# Patient Record
Sex: MALE | Race: WHITE | NOT HISPANIC OR LATINO | Employment: PART TIME | URBAN - METROPOLITAN AREA
[De-identification: names, ages, dates, MRNs, and addresses within clinical notes are randomized per-mention and may not be internally consistent; named-entity substitution may affect disease eponyms.]

---

## 2020-10-06 ENCOUNTER — TELEPHONE (OUTPATIENT)
Dept: FAMILY MEDICINE | Facility: CLINIC | Age: 50
End: 2020-10-06

## 2020-10-06 ENCOUNTER — OFFICE VISIT (OUTPATIENT)
Dept: FAMILY MEDICINE | Facility: CLINIC | Age: 50
End: 2020-10-06
Payer: COMMERCIAL

## 2020-10-06 VITALS
SYSTOLIC BLOOD PRESSURE: 120 MMHG | OXYGEN SATURATION: 98 % | TEMPERATURE: 97.8 F | DIASTOLIC BLOOD PRESSURE: 80 MMHG | HEART RATE: 97 BPM

## 2020-10-06 DIAGNOSIS — F41.1 GAD (GENERALIZED ANXIETY DISORDER): ICD-10-CM

## 2020-10-06 DIAGNOSIS — F43.10 PTSD (POST-TRAUMATIC STRESS DISORDER): ICD-10-CM

## 2020-10-06 DIAGNOSIS — F11.10 OPIOID ABUSE (H): Primary | ICD-10-CM

## 2020-10-06 PROCEDURE — 99203 OFFICE O/P NEW LOW 30 MIN: CPT | Performed by: PHYSICIAN ASSISTANT

## 2020-10-06 SDOH — HEALTH STABILITY: MENTAL HEALTH: HOW MANY STANDARD DRINKS CONTAINING ALCOHOL DO YOU HAVE ON A TYPICAL DAY?: NOT ASKED

## 2020-10-06 SDOH — HEALTH STABILITY: MENTAL HEALTH: HOW OFTEN DO YOU HAVE 6 OR MORE DRINKS ON ONE OCCASION?: NOT ASKED

## 2020-10-06 SDOH — HEALTH STABILITY: MENTAL HEALTH: HOW OFTEN DO YOU HAVE A DRINK CONTAINING ALCOHOL?: NOT ASKED

## 2020-10-06 NOTE — PROGRESS NOTES
Subjective     Justus Cope is a 50 year old male who presents to clinic today for the following health issues:    HPI       Here to establish care  Would like a referral to addiction medicine  Was addicted to opioids for 12 years. Was able to quite cold turkey about 1 year    His mom recently passed away  Feeling a lot of anxiety lately  Having a lot of cravings           Review of Systems   CONSTITUTIONAL: NEGATIVE for fever, chills, change in weight  INTEGUMENTARY/SKIN: NEGATIVE for worrisome rashes, moles or lesions  EYES: NEGATIVE for vision changes or irritation  ENT/MOUTH: NEGATIVE for ear, mouth and throat problems  RESP: NEGATIVE for significant cough or SOB  BREAST: NEGATIVE for masses, tenderness or discharge  CV: NEGATIVE for chest pain, palpitations or peripheral edema  GI: NEGATIVE for nausea, abdominal pain, heartburn, or change in bowel habits  : NEGATIVE for frequency, dysuria, or hematuria  MUSCULOSKELETAL: NEGATIVE for significant arthralgias or myalgia  NEURO: NEGATIVE for weakness, dizziness or paresthesias  ENDOCRINE: NEGATIVE for temperature intolerance, skin/hair changes  HEME: NEGATIVE for bleeding problems  PSYCHIATRIC: delusions, hallucinations, thoughts of hurting someone else and thoughts of self harm      Objective    /80   Pulse 97   Temp 97.8  F (36.6  C) (Oral)   SpO2 98%   There is no height or weight on file to calculate BMI.  Physical Exam   GENERAL: healthy, alert and no distress  EYES: Eyes grossly normal to inspection, PERRL and conjunctivae and sclerae normal  MS: no gross musculoskeletal defects noted, no edema  SKIN: no suspicious lesions or rashes  PSYCH: mentation appears normal, affect normal/bright              ICD-10-CM    1. Opioid abuse (H)  F11.10 INTEGRATED PRIMARY CARE REFERRAL     MENTAL HEALTH REFERRAL  - Adult; Addiction Medicine Provider; Addiction Medicine Evaluation & Treatment; Addiction Medicine Consultation, Evaluation & Treatment [phone]  [phone]; Opioids; Medication Assisted Treatment: Opioids; We will contact you t...   2. PTSD (post-traumatic stress disorder)  F43.10 MENTAL HEALTH REFERRAL  - Adult; Psychiatry, Outpatient Treatment; Individual/Couples/Family/Group Therapy/Health Psychology; [facility]: [facility] [phone]; Psychiatry; [facility]: Psychiatry Clinic [phone]; We will contact you t...   3. ROOPA (generalized anxiety disorder)  F41.1 MENTAL HEALTH REFERRAL  - Adult; Psychiatry, Outpatient Treatment; Individual/Couples/Family/Group Therapy/Health Psychology; [facility]: [facility] [phone]; Psychiatry; [facility]: Psychiatry Clinic [phone]; We will contact you t...     Unclear what will be happening with patients from [facility]. I was advised by Cindy to still place the referral. Return to clinic for any new or worsening symptoms or go to ER Urgent care in off hours      Patient Instructions   Follow up with IPC and addicition medicine  If this doesn't work out, follow up with psychiatrist, therapist and myself for a general physical  Return to clinic for any new or worsening symptoms or go to ER Urgent care in off hours

## 2020-10-06 NOTE — PATIENT INSTRUCTIONS
Follow up with IPC and addicition medicine  If this doesn't work out, follow up with psychiatrist, therapist and myself for a general physical  Return to clinic for any new or worsening symptoms or go to ER Urgent care in off hours

## 2020-10-06 NOTE — TELEPHONE ENCOUNTER
Please review referral. Please route back to writer.     Thank you,    Pau Hopper    [facility]

## 2020-10-07 ENCOUNTER — TELEPHONE (OUTPATIENT)
Dept: ADDICTION MEDICINE | Facility: CLINIC | Age: 50
End: 2020-10-07

## 2020-10-07 NOTE — TELEPHONE ENCOUNTER
Please review referral. Please route back to writer.     Thank you,    Pau Hopper    [facility]

## 2020-10-07 NOTE — TELEPHONE ENCOUNTER
"Please schedule appointment for patient with   Addiction Medicine Provider   For opioid use disorder     Note routed to referring provider   - Please feel free to reach out to myself with any specific patient care needs as well.     I can help address concerns and/or relay the information to the provider who will be scheduled. No need to reply if consult is sufficient.      Our staff will add the following information:     Please offer our patient to call [facility]'s assessment line - [phone]. They can ask for a \"chemical assessment\" or \"rule 25\". This will allow them to discuss possible psychosocial treatment options including individual therapy or any group options including outpatient, intensive outpatient, or residential (inpatient) treatment.          Jesu Arciniega MD    "

## 2020-10-08 NOTE — TELEPHONE ENCOUNTER
Called patient to offer appt with us in clinic tomorrow. LVM indicating we have an opening at 9am tomorrow will be blocked for this patient until 5pm today. Please schedule him for in person visit if he calls back.    Jesu Arciniega MD

## 2020-10-13 NOTE — TELEPHONE ENCOUNTER
Writer attempted to reach pt; no answer. LVM requesting a call back for an appt. Two more attempts will be made.     Pau Hopper    [facility]

## 2020-10-22 NOTE — TELEPHONE ENCOUNTER
Patient was referred to the Integrated primary care clinic, chart was reviewed, referred to addiction med, concerned about opioid relapse, increase in anxiety, no medical complexity,  at this time would recommend staying with current PCP, following with addiction and MH referral      Chana DIGGS  MEDICAL LEAD IPC

## 2020-10-26 NOTE — TELEPHONE ENCOUNTER
Spoke with patient and gave him information for mental health and addiction med referrals and phone numbers.    Nya Sharma RN   [facility]

## 2020-11-16 ENCOUNTER — TELEPHONE (OUTPATIENT)
Dept: ADDICTION MEDICINE | Facility: CLINIC | Age: 50
End: 2020-11-16

## 2020-11-16 NOTE — TELEPHONE ENCOUNTER
Called Justus regarding Recovery [facility]. He is scheduled to see me next Monday morning at 11am for opioid use disorder. Recommended he can be seen this week as a walk-in at  if interested. Provided clinic info, including hours, location, and ph#.    LVM with above information.    Jesu Arciniega MD

## 2020-11-23 ENCOUNTER — OFFICE VISIT (OUTPATIENT)
Dept: ADDICTION MEDICINE | Facility: CLINIC | Age: 50
End: 2020-11-23
Attending: PHYSICIAN ASSISTANT
Payer: COMMERCIAL

## 2020-11-23 VITALS
TEMPERATURE: 98.9 F | SYSTOLIC BLOOD PRESSURE: 136 MMHG | HEART RATE: 93 BPM | WEIGHT: 103.25 LBS | RESPIRATION RATE: 18 BRPM | BODY MASS INDEX: 15.65 KG/M2 | DIASTOLIC BLOOD PRESSURE: 74 MMHG | OXYGEN SATURATION: 98 % | HEIGHT: 68 IN

## 2020-11-23 DIAGNOSIS — M54.2 CHRONIC NECK PAIN: ICD-10-CM

## 2020-11-23 DIAGNOSIS — F10.10 ALCOHOL CONSUMPTION BINGE DRINKING: ICD-10-CM

## 2020-11-23 DIAGNOSIS — G89.29 CHRONIC NECK PAIN: ICD-10-CM

## 2020-11-23 DIAGNOSIS — F11.90 OPIOID USE DISORDER: Primary | ICD-10-CM

## 2020-11-23 LAB
AMPHETAMINES UR QL: NOT DETECTED NG/ML
BARBITURATES UR QL SCN: NOT DETECTED NG/ML
BENZODIAZ UR QL SCN: ABNORMAL NG/ML
BUPRENORPHINE UR QL: ABNORMAL NG/ML
CANNABINOIDS UR QL: NOT DETECTED NG/ML
COCAINE UR QL SCN: NOT DETECTED NG/ML
D-METHAMPHET UR QL: NOT DETECTED NG/ML
METHADONE UR QL SCN: NOT DETECTED NG/ML
OPIATES UR QL SCN: NOT DETECTED NG/ML
OXYCODONE UR QL SCN: NOT DETECTED NG/ML
PCP UR QL SCN: NOT DETECTED NG/ML
PROPOXYPH UR QL: NOT DETECTED NG/ML
TRICYCLICS UR QL SCN: NOT DETECTED NG/ML

## 2020-11-23 PROCEDURE — 80306 DRUG TEST PRSMV INSTRMNT: CPT | Performed by: FAMILY MEDICINE

## 2020-11-23 PROCEDURE — 87389 HIV-1 AG W/HIV-1&-2 AB AG IA: CPT | Performed by: FAMILY MEDICINE

## 2020-11-23 PROCEDURE — 36415 COLL VENOUS BLD VENIPUNCTURE: CPT | Performed by: FAMILY MEDICINE

## 2020-11-23 PROCEDURE — 99205 OFFICE O/P NEW HI 60 MIN: CPT | Performed by: FAMILY MEDICINE

## 2020-11-23 RX ORDER — FLUOXETINE 10 MG/1
10 CAPSULE ORAL DAILY
COMMUNITY
Start: 2020-08-13 | End: 2020-12-07 | Stop reason: ALTCHOICE

## 2020-11-23 RX ORDER — BUPRENORPHINE HYDROCHLORIDE AND NALOXONE HYDROCHLORIDE DIHYDRATE 2; .5 MG/1; MG/1
1 TABLET SUBLINGUAL 2 TIMES DAILY
Qty: 14 TABLET | Refills: 0 | Status: SHIPPED | OUTPATIENT
Start: 2020-11-23 | End: 2020-11-27

## 2020-11-23 ASSESSMENT — MIFFLIN-ST. JEOR: SCORE: 1302.84
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
## 2020-11-23 NOTE — PROGRESS NOTES
"Justus Cope is a 50 year old male who is being evaluated via a billable video visit.      The patient has been notified of following:     \"This video visit will be conducted via a call between you and your physician/provider. We have found that certain health care needs can be provided without the need for an in-person physical exam.  This service lets us provide the care you need with a video conversation.  If a prescription is necessary we can send it directly to your pharmacy.  If lab work is needed we can place an order for that and you can then stop by our lab to have the test done at a later time.    Video visits are billed at different rates depending on your insurance coverage.  Please reach out to your insurance provider with any questions.    If during the course of the call the physician/provider feels a video visit is not appropriate, you will not be charged for this service.\"    Patient has given verbal consent for Video visit? Yes  How would you like to obtain your AVS? MyChart  If you are dropped from the video visit, the video invite should be resent to: Text to cell phone: [phone]   Will anyone else be joining your video visit? No        SUBJECTIVE:                                                    BUPRENORPHINE FOLLOW UP:    Justus Cope is a 50 year old male who completes virtual visit today for follow up of Buprenorphine management      THIS VISIT IS BEING CONDUCTED VIRTUALLY D/T COVID-19 EMERGENCY    Video-Visit Details    Type of service:  Video Visit    Video Start Time: 9:40 AM  Video End Time: 9:51 AM    Originating Location (pt. Location): Home    Distant Location (provider location):  [facility]     Platform used for Video Visit: [employer]        [facility] Nov 27, 2020  SE: n/a  - Steven never received his Rx  - Denies any alcohol or benzo use, no opioid use  - Discussed potential changes to medication in order for insurance to cover this  - " Reports no concerns over the past few days, mostly just wanting to get started as planned  - We discussed that we will start with suboxone, not subutex as he requested over the phone last week      Chart Reviewed - Interval History per EPIC:   Reviewed with patient for clarification and further details  - called about difficulty filling Rx    ROS:  Complete, 10 point ROS completed. Negative except:  none      A/P                                                    ASSESSMENT  Justus was seen today for video visit and recheck medication.    Diagnoses and all orders for this visit:    Opioid use disorder (H)  -     Discontinue: buprenorphine-naloxone (SUBOXONE) 2-0.5 MG SUBL sublingual tablet; Place 1 tablet under the tongue 2 times daily  -     buprenorphine-naloxone (SUBOXONE) 8-2 MG SUBL sublingual tablet; Take 1/2 tablet daily      #7 tabs prescribed    Re-ordered same care plan from last week    PLAN (includes SHx)  - Start suboxone; no changes from last week  - No alcohol/benzo use. Will arrange for in-clinic follow-up in the next 2 weeks to confirm abstinence    RTC:  Future Appointments   Date Time Provider Department Center   12/2/2020 10:00 AM Jesu Arciniega MD [facility] [place]             ENCOUNTER FOR LONG TERM USE OF HIGH RISK MEDICATION   High Risk Drug Monitoring?  YES   Drug being monitored: Buprenorphine   Reason for drug: Opioid Use Disorder   What is being monitored?: Dosage, Cravings, Trigger, side effects, and continued abstinence.   Patient has narcan supply at home    Counseled the patient on the importance of having a recovery program in addition to medication to manage recovery.  Components include avoiding isolating, having willingness to change, avoiding triggers and managing cravings. Encouraged having some type of sober network and practicing honesty with trusted support person(s). Encouraged other services such as counseling, 12 step or other self-help organizations.      Opioid warning  reviewed.  Risk of overdose following a period of abstinence due to decrease tolerance was discussed including risk of death.  Strongly recommended abstain from alcohol, benzodiazepines, THC, opioids and other drugs of abuse.  Increased risk of return to opioid use after use of these substances discussed.  Increased risk of overdose/death with use of other substances particularly benzodiazepines/alcohol reviewed.      HISTORY                                                    Problem list reviewed & adjusted, as indicated.  Patient Active Problem List   Diagnosis     Opioid use disorder (H)     Chronic neck pain       PMH, FMH reviewed and updated in [facility].  Social History per Tx plan and HPI, as above.    MEDICATION LIST (prior to visit)       FLUoxetine (PROZAC) 10 MG capsule, Take 10 mg by mouth daily    No current facility-administered medications on file prior to visit.       No Known Allergies      OBJECTIVE                                                    PHYSICAL EXAM:  UNABLE TO PERFORM VITALS W/VIRTUAL VISIT    GENERAL: Healthy, alert and no distress  EYES: Eyes grossly normal to inspection.  No discharge or erythema, or obvious scleral/conjunctival abnormalities.  RESP: No audible wheeze, cough, or visible cyanosis.  No visible retractions or increased work of breathing.    SKIN: Visible skin clear. No significant rash, abnormal pigmentation or lesions.  NEURO: Cranial nerves grossly intact.  Mentation and speech appropriate for age.  PSYCH: Mentation appears normal, affect normal/bright, judgement and insight intact, normal speech and appearance well-groomed.    LAB  Labs reviewed in EPIC        [place] Board of Pharmacy Data Base Reviewed;    Consistent with patient reports and [facility] records.      Jesu Arciniega MD  [facility] Addiction Medicine  [phone]

## 2020-11-24 ENCOUNTER — TELEPHONE (OUTPATIENT)
Dept: ADDICTION MEDICINE | Facility: CLINIC | Age: 50
End: 2020-11-24

## 2020-11-24 LAB — HIV 1+2 AB+HIV1 P24 AG SERPL QL IA: NONREACTIVE

## 2020-11-24 NOTE — TELEPHONE ENCOUNTER
Central Prior Authorization Team   Phone: [phone]      PA Initiation    Medication: buprenorphine-naloxone (SUBOXONE) 2-0.5 MG SUBL sublingual tablet - INITIATED  Insurance Company: [employer] - Phone [phone] Fax [phone]  Pharmacy Filling the Rx: [facility] - [place], [place] - [address] [place] [address] [address]  Filling Pharmacy Phone: [phone]  Filling Pharmacy Fax: [phone]  Start Date: 11/24/2020

## 2020-11-24 NOTE — TELEPHONE ENCOUNTER
Prior Authorization Retail Medication Request    Medication/Dose: buprenorphine-naloxone (SUBOXONE) 2-0.5 MG SUBL sublingual tablet      Insurance Name: [employer]   Insurance ID: [id number]   Key: [id number]      Pharmacy Information (if different than what is on RX)  Name: [facility]   Phone: [phone]  Fax: [phone]

## 2020-11-25 NOTE — TELEPHONE ENCOUNTER
Central Prior Authorization Team   Phone: [phone]      Prior Authorization Approval    Authorization Effective Date: 11/25/2020  Authorization Expiration Date: 11/25/2020  Medication: buprenorphine-naloxone (SUBOXONE) 2-0.5 MG SUBL sublingual tablet - APPROVED  Approved Dose/Quantity: 14 FOR 7  Reference #:     Insurance Company: [employer] - Phone [phone] Fax [phone]  Expected CoPay:       CoPay Card Available:      Foundation Assistance Needed:    Which Pharmacy is filling the prescription (Not needed for infusion/clinic administered): [facility] - [place], [place] - [address] [place] [address] [address]  Pharmacy Notified: Yes  Patient Notified: Yes (**Instructed pharmacy to notify patient when script is ready to /ship.**)    Verbal approval from [employer] - approved for a one time fill valid today only.    Pt notified pharmacy he was expecting a buprenorphine only product - I see in his chart naloxone is listed as an allergy.    If provider would like to switch to a buprenorphine only product please send new rx to pharmacy - if not, the suboxone is ready for the pt to . Please notify the pt of decision.

## 2020-11-27 ENCOUNTER — VIRTUAL VISIT (OUTPATIENT)
Dept: ADDICTION MEDICINE | Facility: CLINIC | Age: 50
End: 2020-11-27
Payer: COMMERCIAL

## 2020-11-27 ENCOUNTER — TELEPHONE (OUTPATIENT)
Dept: ADDICTION MEDICINE | Facility: CLINIC | Age: 50
End: 2020-11-27

## 2020-11-27 DIAGNOSIS — F11.90 OPIOID USE DISORDER: Primary | ICD-10-CM

## 2020-11-27 PROCEDURE — 99214 OFFICE O/P EST MOD 30 MIN: CPT | Mod: 95 | Performed by: FAMILY MEDICINE

## 2020-11-27 RX ORDER — BUPRENORPHINE HYDROCHLORIDE AND NALOXONE HYDROCHLORIDE DIHYDRATE 8; 2 MG/1; MG/1
TABLET SUBLINGUAL
Qty: 5 TABLET | Refills: 0 | Status: SHIPPED | OUTPATIENT
Start: 2020-11-27 | End: 2020-12-02

## 2020-11-27 RX ORDER — BUPRENORPHINE HYDROCHLORIDE AND NALOXONE HYDROCHLORIDE DIHYDRATE 2; .5 MG/1; MG/1
1 TABLET SUBLINGUAL 2 TIMES DAILY
Qty: 14 TABLET | Refills: 0 | Status: SHIPPED | OUTPATIENT
Start: 2020-11-27 | End: 2020-11-27 | Stop reason: DRUGHIGH

## 2020-11-27 NOTE — PROGRESS NOTES
"Justus Cope is a 50 year old male who is being evaluated via a billable video visit.      The patient has been notified of following:     \"This video visit will be conducted via a call between you and your physician/provider. We have found that certain health care needs can be provided without the need for an in-person physical exam.  This service lets us provide the care you need with a video conversation.  If a prescription is necessary we can send it directly to your pharmacy.  If lab work is needed we can place an order for that and you can then stop by our lab to have the test done at a later time.    Video visits are billed at different rates depending on your insurance coverage.  Please reach out to your insurance provider with any questions.    If during the course of the call the physician/provider feels a video visit is not appropriate, you will not be charged for this service.\"    Patient has given verbal consent for Video visit? Yes  How would you like to obtain your AVS? MyChart  If you are dropped from the video visit, the video invite should be resent to: Text to cell phone: [phone]   Will anyone else be joining your video visit? No        SUBJECTIVE:                                                    BUPRENORPHINE FOLLOW UP:    Justus Cope is a 50 year old male who completes virtual visit today for follow up of Buprenorphine management      THIS VISIT IS BEING CONDUCTED VIRTUALLY D/T COVID-19 EMERGENCY    Video-Visit Details    Type of service:  Video Visit    Video Start Time: 10:15 AM  Video End Time: 10:38 AM    Originating Location (pt. Location): Home    Distant Location (provider location):  [facility]     Platform used for Video Visit: [facility] Dec 2, 2020  SE: upset stomach  - Steven received Rx from [facility], which was different from the initial Rx. Our instructions were to take 2-0.5mg tabs twice daily, but insurance would not cover this twice and his " initial pharmacy was not responsive. New Rx sent for 8-2mg tabs with instructions to take 1/2 tab daily. Asked pharmacist to d/w patient  - Steven did not discuss with pharmacist and did not pay attention to the quantity or directions. He took 2 tablets, with initial dose of 16mg.  - Experienced some runny stool, upset stomach, and subsequently started taking small amounts of suboxone (pieces of a tablet) in order to last until today  - No cravings at low doses  - Going to work without any concerns  - 2 beers in the past 5 days  - No benzo use  - Continues to struggle with anxiety      Chart Reviewed - Interval History per EPIC:   Reviewed with patient for clarification and further details  - refill request Monday, not seen by MD until today    ROS:  Complete, 10 point ROS completed. Negative except:  Stomach sx as above      A/P                                                    ASSESSMENT  Justus was seen today for video visit.    Diagnoses and all orders for this visit:    Opioid use disorder (H)  -     buprenorphine-naloxone (SUBOXONE) 8-2 MG SUBL sublingual tablet; Take 1/2 tablet twice daily      #7 tabs prescribed    - Reinforced instructions with teachback  - Suspect his side-effects were d/t rapid titration of suboxone  - He did not experience any adverse effects d/t previous use of his partner's subutex    PLAN (includes SHx)  OUD treatment plan updated Dec 2, 2020 and reviewed with patient:    Problem   Opioid Use Disorder (H)    OUD Treatment Plan, with historical details (incl SHx)    Rx details: suboxone 8-2mg tabs, 1/2 tab BID   F/up: weekly       Severity indicators (O/D, IVDU, etc): No severe features. Using his partner's subutex and valium, other benzos, and bingeing on alcohol often   Medical complications: None; depression/anxiety present       Housing:  Stable, lives with partner in [facility] Capital: none   Employment: Full-time job in refrigeration   Legal:  none   Other social  barriers:  Distance to clinic   Consent to Communicate? Partner; no consent signed, but verbally on 1st visit. No contact info       Tx history None; started on suboxone @ [facility] Nov 23, 2020   Current Psychosocial tx:  none     Recent Changes:  Dec 2, 2020 - Reinforced appropriate dosing strategy. F/up next week with alcohol/benzo testing. Patient struggling with complete abstinence.        - Needs twice monthly urine drug screens at a minimum given his ongoing alcohol use and intermittent benzo use with inconsistent reporting of use  - Indicated we will further discuss mental health after stabilizing on buprenorphine    RTC:  Future Appointments   Date Time Provider Department Center   12/7/2020 11:20 AM Jesu Arciniega MD [facility]             ENCOUNTER FOR LONG TERM USE OF HIGH RISK MEDICATION   High Risk Drug Monitoring?  YES   Drug being monitored: Buprenorphine   Reason for drug: Opioid Use Disorder   What is being monitored?: Dosage, Cravings, Trigger, side effects, and continued abstinence.   Patient has narcan supply at home    Counseled the patient on the importance of having a recovery program in addition to medication to manage recovery.  Components include avoiding isolating, having willingness to change, avoiding triggers and managing cravings. Encouraged having some type of sober network and practicing honesty with trusted support person(s). Encouraged other services such as counseling, 12 step or other self-help organizations.      Opioid warning reviewed.  Risk of overdose following a period of abstinence due to decrease tolerance was discussed including risk of death.  Strongly recommended abstain from alcohol, benzodiazepines, THC, opioids and other drugs of abuse.  Increased risk of return to opioid use after use of these substances discussed.  Increased risk of overdose/death with use of other substances particularly benzodiazepines/alcohol reviewed.      HISTORY                                                     Problem list reviewed & adjusted, as indicated.  Patient Active Problem List   Diagnosis     Opioid use disorder (H)     Chronic neck pain       PMH, FMH reviewed and updated in [facility].  Social History per Tx plan and HPI, as above.    MEDICATION LIST (prior to visit)       FLUoxetine (PROZAC) 10 MG capsule, Take 10 mg by mouth daily    No current facility-administered medications on file prior to visit.       No Known Allergies      OBJECTIVE                                                    PHYSICAL EXAM:  UNABLE TO PERFORM VITALS W/VIRTUAL VISIT    GENERAL: Healthy, alert and no distress  EYES: Eyes grossly normal to inspection.  No discharge or erythema, or obvious scleral/conjunctival abnormalities.  RESP: No audible wheeze, cough, or visible cyanosis.  No visible retractions or increased work of breathing.    SKIN: Visible skin clear. No significant rash, abnormal pigmentation or lesions.  NEURO: Cranial nerves grossly intact.  Mentation and speech appropriate for age.  PSYCH: Mentation appears normal, affect normal/bright, mood somewhat anxious, judgement and insight intact, normal speech and appearance well-groomed.    LAB  Labs reviewed in EPIC        [place] Board of Pharmacy Data Base Reviewed;    Consistent with patient reports and [facility] records.      Jesu Arciniega MD  [facility]  [phone]

## 2020-11-27 NOTE — TELEPHONE ENCOUNTER
Please call Leah August - [phone] - to confirm insurance coverage and medication availability - new Rx sent there this morning, as his [facility] pharmacy does not have this in stock until Monday.    Please call patient when medication is confirmed available.    Jesu Arciniega MD

## 2020-11-30 DIAGNOSIS — F11.90 OPIOID USE DISORDER: ICD-10-CM

## 2020-12-02 ENCOUNTER — VIRTUAL VISIT (OUTPATIENT)
Dept: ADDICTION MEDICINE | Facility: CLINIC | Age: 50
End: 2020-12-02
Payer: COMMERCIAL

## 2020-12-02 DIAGNOSIS — F11.90 OPIOID USE DISORDER: ICD-10-CM

## 2020-12-02 PROCEDURE — 99214 OFFICE O/P EST MOD 30 MIN: CPT | Mod: 95 | Performed by: FAMILY MEDICINE

## 2020-12-02 RX ORDER — BUPRENORPHINE HYDROCHLORIDE AND NALOXONE HYDROCHLORIDE DIHYDRATE 8; 2 MG/1; MG/1
TABLET SUBLINGUAL
Qty: 5 TABLET | OUTPATIENT
Start: 2020-12-02

## 2020-12-02 RX ORDER — BUPRENORPHINE HYDROCHLORIDE AND NALOXONE HYDROCHLORIDE DIHYDRATE 8; 2 MG/1; MG/1
TABLET SUBLINGUAL
Qty: 7 TABLET | Refills: 0 | Status: SHIPPED | OUTPATIENT
Start: 2020-12-02 | End: 2020-12-07 | Stop reason: DRUGHIGH

## 2020-12-07 ENCOUNTER — TELEPHONE (OUTPATIENT)
Dept: ADDICTION MEDICINE | Facility: CLINIC | Age: 50
End: 2020-12-07

## 2020-12-07 ENCOUNTER — OFFICE VISIT (OUTPATIENT)
Dept: ADDICTION MEDICINE | Facility: CLINIC | Age: 50
End: 2020-12-07
Payer: COMMERCIAL

## 2020-12-07 VITALS
DIASTOLIC BLOOD PRESSURE: 78 MMHG | SYSTOLIC BLOOD PRESSURE: 138 MMHG | HEART RATE: 72 BPM | BODY MASS INDEX: 15.51 KG/M2 | WEIGHT: 102 LBS | OXYGEN SATURATION: 97 % | TEMPERATURE: 98.3 F

## 2020-12-07 DIAGNOSIS — F10.10 ALCOHOL CONSUMPTION BINGE DRINKING: ICD-10-CM

## 2020-12-07 DIAGNOSIS — F41.8 DEPRESSION WITH ANXIETY: ICD-10-CM

## 2020-12-07 DIAGNOSIS — F11.90 OPIOID USE DISORDER: Primary | ICD-10-CM

## 2020-12-07 PROCEDURE — 80307 DRUG TEST PRSMV CHEM ANLYZR: CPT | Mod: 90 | Performed by: FAMILY MEDICINE

## 2020-12-07 PROCEDURE — 36415 COLL VENOUS BLD VENIPUNCTURE: CPT | Performed by: FAMILY MEDICINE

## 2020-12-07 PROCEDURE — 99214 OFFICE O/P EST MOD 30 MIN: CPT | Performed by: FAMILY MEDICINE

## 2020-12-07 PROCEDURE — 99000 SPECIMEN HANDLING OFFICE-LAB: CPT | Performed by: FAMILY MEDICINE

## 2020-12-07 PROCEDURE — 80346 BENZODIAZEPINES1-12: CPT | Mod: 90 | Performed by: FAMILY MEDICINE

## 2020-12-07 RX ORDER — BUPRENORPHINE HYDROCHLORIDE AND NALOXONE HYDROCHLORIDE DIHYDRATE 2; .5 MG/1; MG/1
1 TABLET SUBLINGUAL 2 TIMES DAILY
Qty: 14 TABLET | Refills: 0
Start: 2020-12-07 | End: 2020-12-08

## 2020-12-07 RX ORDER — BUPRENORPHINE HYDROCHLORIDE AND NALOXONE HYDROCHLORIDE DIHYDRATE 8; 2 MG/1; MG/1
TABLET SUBLINGUAL
Qty: 7 TABLET | Refills: 0 | Status: SHIPPED | OUTPATIENT
Start: 2020-12-07 | End: 2020-12-16

## 2020-12-07 RX ORDER — PAROXETINE 10 MG/1
10 TABLET, FILM COATED ORAL EVERY MORNING
Qty: 30 TABLET | Refills: 1 | Status: SHIPPED | OUTPATIENT
Start: 2020-12-07 | End: 2021-02-03

## 2020-12-07 NOTE — TELEPHONE ENCOUNTER
Please start prior authorization for suboxone tablets.    [facility] of any particular strength daily.    Patient has diarrhea from 8-2mg dose (1/2 tab or 4-1mg dose twice daily).    Want to switch to 2-0.5mg twice daily dose, but insurance will not cover this.    One 2-0.5mg tablet daily is not sufficient to control cravings or provide overdose protection.    He has tried and failed suboxone films d/t nausea, retching, and vomiting.    Jesu Arciniega MD

## 2020-12-07 NOTE — PROGRESS NOTES
SUBJECTIVE                                                    BUPRENORPHINE FOLLOW UP:    Justus Cope is a 50 year old male who presents to clinic today for follow up of Buprenorphine.        HPI Dec 7, 2020  SE: diarrhea  - feeling somewhat more energetic since starting suboxone  - Runny stools; 2-3 times per day, typically once a day  - Starts feeling an upset stomach shortly after taking the buprenorphine  - No cravings  - Helping a little with neck pain as well  - Last use of alcohol 2 days ago; stressed about holidays, trying to put up lights.  - reports a long history of using alcohol as his primary coping tool for distress. Also reports using benzos for this in the past, but denies any use of these since we started working together  - Wants to try medications for anxiety and stress mgmt.  - Has tried lexapro, duloxetine, and propranolol in the past, and none of these have been helpful  - Took fluoxetine recently, which made him vomit        Chart Reviewed - Interval History:   Reviewed with patient for clarification and further details  - phone calls to clinic for Rx issues    ROS:  Complete, 10 point ROS completed. Negative except:  Diarrhea, stress/anxiety      A/P                                                    ASSESSMENT  Diagnoses and all orders for this visit:    Opioid use disorder (H)  -     Urine Drugs of Abuse Screen Panel 13  -     Benzodiazepine Confirmatory Urine Qual  -     buprenorphine-naloxone (SUBOXONE) 8-2 MG SUBL sublingual tablet; Take 1/2 tablet twice daily  -     Ethyl Glucuronide Urine    Depression with anxiety  -     PARoxetine (PAXIL) 10 MG tablet; Take 1 tablet (10 mg) by mouth every morning Take 1/2 tablet for the first 3-5 days    Alcohol consumption binge drinking  -     Ethyl Glucuronide Urine      #7 tabs prescribed    - Will work with insurance to get PA completed so he can try a lower dose of suboxone and see if this helps his diarrhea  - Currently noticing  improved mood/energy with suboxone, but stress/anxiety/irritability still a problem  - Continues to drink as a main coping skill for above sx  - denies further benzo use  - Paxil as above; discussed risks/benefits including potential GI upset (which will resolve in days), risk of weight gain and drowsiness - advised to take at night if drowsiness a concern.      PLAN (includes SHx)  OUD treatment plan updated Dec 7, 2020 and reviewed with patient:    Problem   Opioid Use Disorder (H)    OUD Treatment Plan, with historical details (incl SHx)    Rx details: suboxone 8-2mg tabs, 1/2 tab BID   F/up: weekly       Severity indicators (O/D, IVDU, etc): No severe features. Using his partner's subutex and valium, other benzos, and bingeing on alcohol often   Medical complications: None; depression/anxiety present       Housing:  Stable, lives with partner in [place] Capital: none   Employment: Full-time job in refrigeration   Legal:  none   Other social barriers:  Distance to clinic   Consent to Communicate? Partner; no consent signed, but verbally on 1st visit. No contact info       Tx history None; started on suboxone @ [facility] Nov 23, 2020   Current Psychosocial tx:  none     - Needs twice monthly urine drug screens at a minimum given his ongoing alcohol use and intermittent benzo use with inconsistent reporting of use    Recent Changes:  Dec 2, 2020 - Reinforced appropriate dosing strategy. F/up next week with alcohol/benzo testing. Patient struggling with complete abstinence.  Dec 7, 2020 - Will attempt decreasing suboxone to 2mg BID dose to see if this improves diarrhea. Advised we will not consider switch to subutex unless he can prove alcohol abstinence. Need to continue testing for benzo quant if urine screen remains positive at 2wk f/up            RTC:  Future Appointments   Date Time Provider Department Center   12/23/2020  9:00 AM Jesu Arciniega MD [facility]             ENCOUNTER FOR LONG TERM USE  OF HIGH RISK MEDICATION   High Risk Drug Monitoring?  YES   Drug being monitored: Buprenorphine   Reason for drug: Opioid Use Disorder   What is being monitored?: Dosage, Cravings, Trigger, side effects, and continued abstinence.   Patient has narcan supply at home    Counseled the patient on the importance of having a recovery program in addition to medication to manage recovery.  Components include avoiding isolating, having willingness to change, avoiding triggers and managing cravings. Encouraged having some type of sober network and practicing honesty with trusted support person(s). Encouraged other services such as counseling, 12 step or other self-help organizations.      Opioid warning reviewed.  Risk of overdose following a period of abstinence due to decrease tolerance was discussed including risk of death.  Strongly recommended abstain from alcohol, benzodiazepines, THC, opioids and other drugs of abuse.  Increased risk of return to opioid use after use of these substances discussed.  Increased risk of overdose/death with use of other substances particularly benzodiazepines/alcohol reviewed.        HISTORY                                                    Problem list reviewed & adjusted, as indicated.  Patient Active Problem List   Diagnosis     Opioid use disorder (H)     Chronic neck pain       PMH, FMH reviewed and updated in [facility].  Social History per Tx plan and HPI, as above.    MEDICATION LIST (prior to visit)  No current outpatient medications on file prior to visit.  No current facility-administered medications on file prior to visit.       No Known Allergies      OBJECTIVE                                                    PHYSICAL EXAM:  /78   Pulse 72   Temp 98.3  F (36.8  C) (Temporal)   Wt 46.3 kg (102 lb)   SpO2 97%   BMI 15.51 kg/m      GENERAL APPEARANCE:  alert, comfortable appearing  EYES:Eyes grossly normal to inspection  NEURO:  Gait normal.  No tremor. Coordination  intact.   MENTAL STATUS EXAM:  Appearance/Behavior: No appearant distress  Speech: Normal  Mood/Affect: anxiety  Insight: Fair    LAB  Results for orders placed or performed in visit on 12/07/20   Urine Drugs of Abuse Screen Panel 13     Status: Abnormal   Result Value Ref Range    Cannabinoids (75-ljn-7-carboxy-9-THC) Not Detected NDET^Not Detected ng/mL    Phencyclidine (Phencyclidine) Not Detected NDET^Not Detected ng/mL    Cocaine (Benzoylecgonine) Not Detected NDET^Not Detected ng/mL    Methamphetamine (d-Methamphetamine) Not Detected NDET^Not Detected ng/mL    Opiates (Morphine) Not Detected NDET^Not Detected ng/mL    Amphetamine (d-Amphetamine) Not Detected NDET^Not Detected ng/mL    Benzodiazepines (Nordiazepam) Detected, Abnormal Result (A) NDET^Not Detected ng/mL    Tricyclic Antidepressants (Desipramine) Not Detected NDET^Not Detected ng/mL    Methadone (Methadone) Not Detected NDET^Not Detected ng/mL    Barbiturates (Butalbital) Not Detected NDET^Not Detected ng/mL    Oxycodone (Oxycodone) Not Detected NDET^Not Detected ng/mL    Propoxyphene (Norpropoxyphene) Not Detected NDET^Not Detected ng/mL    Buprenorphine (Buprenorphine) Detected, Abnormal Result (A) NDET^Not Detected ng/mL   Awaiting ETG results      [facility] Board of Pharmacy Data Base Reviewed;    Consistent with patient reports and Epic records.      Jesu Arciniega MD  [facility]  [phone]

## 2020-12-08 NOTE — TELEPHONE ENCOUNTER
Medication pended.   Routing to provider for approval.    Faina Linder, RN    Nurse Liaison  [facility]    Addiction Medicine Services

## 2020-12-08 NOTE — TELEPHONE ENCOUNTER
Central Prior Authorization Team   Phone: [phone]      Prior Authorization Approval    Authorization Effective Date: 12/8/2020  Authorization Expiration Date: 12/7/2021  Medication: buprenorphine-naloxone (SUBOXONE) 2-0.5 MG SUBL sublingual tablet - APPROVED  Approved Dose/Quantity: 14 FOR 7  Reference #:     Insurance Company: [employer] - Phone [phone] Fax [phone]  Expected CoPay:       CoPay Card Available:      Foundation Assistance Needed:    Which Pharmacy is filling the prescription (Not needed for infusion/clinic administered): [facility] - [address]  Pharmacy Notified: Yes  Patient Notified: Yes (**Instructed pharmacy to notify patient when script is ready to /ship.**)    Verbal approval from Amy with [employer].    Notified pharmacy - pharmacist stated the rx was discontinued due to the pt picking up a 7 days supply of buprenorphine-naloxone (SUBOXONE) 8-2 MG SUBL sublingual tablet yesterday, 12/07/2020.    Pharmacy will need a new rx for future fills.

## 2020-12-08 NOTE — TELEPHONE ENCOUNTER
Central Prior Authorization Team   Phone: [phone]      PA Initiation    Medication: buprenorphine-naloxone (SUBOXONE) 2-0.5 MG SUBL sublingual tablet - INITIATED  Insurance Company: [employer] - Phone [phone] Fax [phone]  Pharmacy Filling the Rx: [facility] - [place], [place] - [address] [place] [address] [address]  Filling Pharmacy Phone: [phone]  Filling Pharmacy Fax: [phone]  Start Date: 12/8/2020

## 2020-12-09 RX ORDER — BUPRENORPHINE HYDROCHLORIDE AND NALOXONE HYDROCHLORIDE DIHYDRATE 2; .5 MG/1; MG/1
1 TABLET SUBLINGUAL 2 TIMES DAILY
Qty: 16 TABLET | Refills: 0 | Status: SHIPPED | OUTPATIENT
Start: 2020-12-09 | End: 2020-12-16

## 2020-12-09 NOTE — TELEPHONE ENCOUNTER
Called patient, reported approval of PA.    Will start with 2-0.5mg suboxone BID to see if this helps overcome his diarrhea as a reported SE of 4-1mg twice daily.    Indicated that, even if side-effects continue, we will not switch to subutex (which is patient's preference) if he continues to use alcohol and benzos.    Will call in 1 week to check on efficacy of lower dose, and he has f/up scheduled for 2 weeks from now.    Jesu Arciniega MD

## 2020-12-16 ENCOUNTER — TELEPHONE (OUTPATIENT)
Dept: FAMILY MEDICINE | Facility: CLINIC | Age: 50
End: 2020-12-16

## 2020-12-16 DIAGNOSIS — F11.90 OPIOID USE DISORDER: Primary | ICD-10-CM

## 2020-12-16 LAB
A-OH ALPRAZ UR QL CFM: NEGATIVE
ALPRAZ UR QL CFM: NEGATIVE
ETHYL GLUCURONIDE UR QL: POSITIVE
ETHYL GLUCURONIDE UR-MCNC: NORMAL NG/ML
ETHYL SULFATE UR-MCNC: NORMAL NG/ML
LORAZEPAM UR QL CFM: NEGATIVE
NORDIAZEPAM UR QL CFM: NEGATIVE
OXAZEPAM UR QL CFM: NEGATIVE
TEMAZEPAM UR QL CFM: NEGATIVE

## 2020-12-16 RX ORDER — BUPRENORPHINE HYDROCHLORIDE AND NALOXONE HYDROCHLORIDE DIHYDRATE 2; .5 MG/1; MG/1
1 TABLET SUBLINGUAL 2 TIMES DAILY
Qty: 16 TABLET | Refills: 0 | Status: SHIPPED | OUTPATIENT
Start: 2020-12-16 | End: 2020-12-23

## 2020-12-16 NOTE — TELEPHONE ENCOUNTER
Called Justus to discuss suboxone.    Prescribed a lower dose of 2mg twice daily last week, which has helped a lot to improve his gastric distress and diarrhea. No cravings, feeling positive overall.    No side-effects from paxil; not noticing any anxiety improvement yet.    No benzo use. Had a drink with dinner 4 days ago.    Appt in person next week.    Jesu Arciniega MD

## 2020-12-16 NOTE — TELEPHONE ENCOUNTER
Reason for Call: call back and prescription     Detailed comments: Patient was wondering if they needed to contact the doctor to get their weekly refill filled.    Phone Number Patient can be reached at: Cell number on file:    Telephone Information:   Mobile [phone]       Best Time: anytime    Can we leave a detailed message on this number? YES    Call taken on 12/16/2020 at 11:47 AM by Polo Rushing

## 2020-12-16 NOTE — TELEPHONE ENCOUNTER
Will forward to Dr Arciniega - appears this is  related to Suboxone rx.    Marylu Dawn, DO on 12/16/2020 at 12:43 PM

## 2020-12-23 ENCOUNTER — OFFICE VISIT (OUTPATIENT)
Dept: ADDICTION MEDICINE | Facility: CLINIC | Age: 50
End: 2020-12-23
Payer: COMMERCIAL

## 2020-12-23 VITALS
HEART RATE: 76 BPM | TEMPERATURE: 98.6 F | DIASTOLIC BLOOD PRESSURE: 78 MMHG | WEIGHT: 97 LBS | SYSTOLIC BLOOD PRESSURE: 144 MMHG | OXYGEN SATURATION: 99 % | BODY MASS INDEX: 14.75 KG/M2

## 2020-12-23 DIAGNOSIS — F11.90 OPIOID USE DISORDER: Primary | ICD-10-CM

## 2020-12-23 DIAGNOSIS — F41.8 DEPRESSION WITH ANXIETY: ICD-10-CM

## 2020-12-23 DIAGNOSIS — F10.10 ALCOHOL CONSUMPTION BINGE DRINKING: ICD-10-CM

## 2020-12-23 PROCEDURE — 80307 DRUG TEST PRSMV CHEM ANLYZR: CPT | Mod: 90 | Performed by: FAMILY MEDICINE

## 2020-12-23 PROCEDURE — 36415 COLL VENOUS BLD VENIPUNCTURE: CPT | Performed by: FAMILY MEDICINE

## 2020-12-23 PROCEDURE — 99000 SPECIMEN HANDLING OFFICE-LAB: CPT | Performed by: FAMILY MEDICINE

## 2020-12-23 PROCEDURE — 99214 OFFICE O/P EST MOD 30 MIN: CPT | Performed by: FAMILY MEDICINE

## 2020-12-23 RX ORDER — BUPRENORPHINE HYDROCHLORIDE AND NALOXONE HYDROCHLORIDE DIHYDRATE 2; .5 MG/1; MG/1
1 TABLET SUBLINGUAL 2 TIMES DAILY
Qty: 30 TABLET | Refills: 0 | Status: SHIPPED | OUTPATIENT
Start: 2020-12-23 | End: 2021-01-06

## 2020-12-23 NOTE — PROGRESS NOTES
"  SUBJECTIVE                                                    BUPRENORPHINE FOLLOW UP:    Justus Cope is a 50 year old male who presents to clinic today for follow up of Buprenorphine.        HPI Dec 23, 2020  SE: none; diarrhea resolved  - thinks he may be \"zoning out\" from paxil  - no other side-effects; these are improving lately  - Taking medication consistently  - Cravings minimal; no longer exposed to co-worker that uses opioids  - no alcohol use; had cravings last night but decided not to drink  - Anxiety somewhat improved; stress still there, management not significantly improved, but hoping when holidays end this will be easier  - Eating a little less d/t fatigue from overwork; took an extra suboxone 3 days ago d/t neck pain      Chart Reviewed - Interval History:   Reviewed with patient for clarification and further details  - none    ROS:  Complete, 10 point ROS completed. Negative except:  - tired/sore from working extra hours lately      A/P                                                    ASSESSMENT  Justus was seen today for recheck.    Diagnoses and all orders for this visit:    Opioid use disorder (H)  -     Urine Drugs of Abuse Screen Panel 13  -     buprenorphine-naloxone (SUBOXONE) 2-0.5 MG SUBL sublingual tablet; Place 1 tablet under the tongue 2 times daily    Alcohol consumption binge drinking  -     Ethyl Glucuronide Urine    Depression with anxiety      #30 tabs prescribed      PLAN (includes SHx)  OUD treatment plan updated Dec 23, 2020 and reviewed with patient:    Problem   Opioid Use Disorder (H)    OUD Treatment Plan, with historical details (incl SHx)    Rx details: suboxone 2-0.5mg tabs, 1 tab BID   F/up: 2 weeks       Severity indicators (O/D, IVDU, etc): No severe features. Using his partner's subutex and valium, other benzos, and bingeing on alcohol often   Medical complications: None; depression/anxiety present       Housing:  Stable, lives with partner in [facility] "   Recovery Capital: none   Employment: Full-time job in refrigeration   Legal:  none   Other social barriers:  Distance to clinic   Consent to Communicate? Partner; no consent signed, but verbally on 1st visit. No contact info       Tx history None; started on suboxone @ [facility] Nov 23, 2020   Current Psychosocial tx:  none     - Needs twice monthly urine drug screens at a minimum given his ongoing alcohol use and intermittent benzo use with inconsistent reporting of use    Recent Changes:  Dec 2, 2020 - Reinforced appropriate dosing strategy. F/up next week with alcohol/benzo testing. Patient struggling with complete abstinence.  Dec 7, 2020 - Will attempt decreasing suboxone to 2mg BID dose to see if this improves diarrhea. Advised we will not consider switch to subutex unless he can prove alcohol abstinence. Need to continue testing for benzo quant if urine screen remains positive at 2wk f/up  Dec 23, 2020 - No SE with decreased dose. Paxil with minimal help, SE abated; monitor for another 4wk. Denies any alcohol/benzo use. Benzo confirmation negative last visit; will check confirmation again at next visit if remains positive; likely FALSE POSITIVE, but unclear source        ADDENDUM: Alcohol levels returned above expected - Justus reporting NO alcohol use and levels reflect at least several drinks in the past 2-3 days. Will need to continue in person visits until this can be resolved.    RTC:  Future Appointments   Date Time Provider Department Center   1/6/2021  9:00 AM Jesu Arciniega MD [facility]             ENCOUNTER FOR LONG TERM USE OF HIGH RISK MEDICATION   High Risk Drug Monitoring?  YES   Drug being monitored: Buprenorphine   Reason for drug: Opioid Use Disorder   What is being monitored?: Dosage, Cravings, Trigger, side effects, and continued abstinence.   Patient has narcan supply at home    Counseled the patient on the importance of having a recovery program in addition to medication to manage  recovery.  Components include avoiding isolating, having willingness to change, avoiding triggers and managing cravings. Encouraged having some type of sober network and practicing honesty with trusted support person(s). Encouraged other services such as counseling, 12 step or other self-help organizations.      Opioid warning reviewed.  Risk of overdose following a period of abstinence due to decrease tolerance was discussed including risk of death.  Strongly recommended abstain from alcohol, benzodiazepines, THC, opioids and other drugs of abuse.  Increased risk of return to opioid use after use of these substances discussed.  Increased risk of overdose/death with use of other substances particularly benzodiazepines/alcohol reviewed.        HISTORY                                                    Problem list reviewed & adjusted, as indicated.  Patient Active Problem List   Diagnosis     Opioid use disorder (H)     Chronic neck pain       PMH, FMH reviewed and updated in [employer].  Social History per Tx plan and HPI, as above.    MEDICATION LIST (prior to visit)       PARoxetine (PAXIL) 10 MG tablet, Take 1 tablet (10 mg) by mouth every morning Take 1/2 tablet for the first 3-5 days    No current facility-administered medications on file prior to visit.       No Known Allergies      OBJECTIVE                                                    PHYSICAL EXAM:  BP (!) 144/78   Pulse 76   Temp 98.6  F (37  C) (Temporal)   Wt 44 kg (97 lb)   SpO2 99%   BMI 14.75 kg/m      GENERAL APPEARANCE:  alert, comfortable appearing  EYES:Eyes grossly normal to inspection  NEURO:  Gait normal.  No tremor. Coordination intact.   MENTAL STATUS EXAM:  Appearance/Behavior: No appearant distress  Speech: Normal  Mood/Affect: normal affect  Insight: Adequate    LAB  Results for orders placed or performed in visit on 12/23/20   Urine Drugs of Abuse Screen Panel 13     Status: Abnormal   Result Value Ref Range    Cannabinoids  (41-qgr-9-carboxy-9-THC) Not Detected NDET^Not Detected ng/mL    Phencyclidine (Phencyclidine) Not Detected NDET^Not Detected ng/mL    Cocaine (Benzoylecgonine) Not Detected NDET^Not Detected ng/mL    Methamphetamine (d-Methamphetamine) Not Detected NDET^Not Detected ng/mL    Opiates (Morphine) Not Detected NDET^Not Detected ng/mL    Amphetamine (d-Amphetamine) Not Detected NDET^Not Detected ng/mL    Benzodiazepines (Nordiazepam) Detected, Abnormal Result (A) NDET^Not Detected ng/mL    Tricyclic Antidepressants (Desipramine) Not Detected NDET^Not Detected ng/mL    Methadone (Methadone) Not Detected NDET^Not Detected ng/mL    Barbiturates (Butalbital) Not Detected NDET^Not Detected ng/mL    Oxycodone (Oxycodone) Not Detected NDET^Not Detected ng/mL    Propoxyphene (Norpropoxyphene) Not Detected NDET^Not Detected ng/mL    Buprenorphine (Buprenorphine) Detected, Abnormal Result (A) NDET^Not Detected ng/mL   Ethyl Glucuronide Urine     Status: None   Result Value Ref Range    Ethyl Glucuronide Urine Positive      Ethyl Glucuronide Conf     Status: None   Result Value Ref Range    Ethyl Glucuronide Qnt >10,000 ng/mL    Ethyl Sulfate Qnt >5,000 ng/mL         [place] Board of Pharmacy Data Base Reviewed;      Consistent with patient reports and Epic records.      Jesu Arciniega MD  [facility]  [phone]

## 2020-12-29 LAB
ETHYL GLUCURONIDE UR QL: POSITIVE
ETHYL GLUCURONIDE UR-MCNC: NORMAL NG/ML
ETHYL SULFATE UR-MCNC: >5000 NG/ML

## 2021-01-06 ENCOUNTER — OFFICE VISIT (OUTPATIENT)
Dept: ADDICTION MEDICINE | Facility: CLINIC | Age: 51
End: 2021-01-06
Payer: COMMERCIAL

## 2021-01-06 VITALS
WEIGHT: 104.4 LBS | TEMPERATURE: 97.5 F | HEART RATE: 81 BPM | SYSTOLIC BLOOD PRESSURE: 156 MMHG | BODY MASS INDEX: 15.87 KG/M2 | DIASTOLIC BLOOD PRESSURE: 102 MMHG | RESPIRATION RATE: 16 BRPM | OXYGEN SATURATION: 96 %

## 2021-01-06 DIAGNOSIS — F11.90 OPIOID USE DISORDER: Primary | ICD-10-CM

## 2021-01-06 DIAGNOSIS — F10.10 ALCOHOL CONSUMPTION BINGE DRINKING: ICD-10-CM

## 2021-01-06 PROCEDURE — 99215 OFFICE O/P EST HI 40 MIN: CPT | Performed by: FAMILY MEDICINE

## 2021-01-06 PROCEDURE — 80306 DRUG TEST PRSMV INSTRMNT: CPT | Performed by: FAMILY MEDICINE

## 2021-01-06 PROCEDURE — 36415 COLL VENOUS BLD VENIPUNCTURE: CPT | Performed by: FAMILY MEDICINE

## 2021-01-06 PROCEDURE — 99000 SPECIMEN HANDLING OFFICE-LAB: CPT | Performed by: FAMILY MEDICINE

## 2021-01-06 PROCEDURE — 80307 DRUG TEST PRSMV CHEM ANLYZR: CPT | Mod: 90 | Performed by: FAMILY MEDICINE

## 2021-01-06 PROCEDURE — 80346 BENZODIAZEPINES1-12: CPT | Mod: 90 | Performed by: FAMILY MEDICINE

## 2021-01-06 RX ORDER — BUPRENORPHINE HYDROCHLORIDE AND NALOXONE HYDROCHLORIDE DIHYDRATE 2; .5 MG/1; MG/1
1 TABLET SUBLINGUAL 2 TIMES DAILY
Qty: 30 TABLET | Refills: 0 | Status: SHIPPED | OUTPATIENT
Start: 2021-01-06 | End: 2021-01-20

## 2021-01-06 ASSESSMENT — PATIENT HEALTH QUESTIONNAIRE - PHQ9: SUM OF ALL RESPONSES TO PHQ QUESTIONS 1-9: 0

## 2021-01-06 NOTE — PROGRESS NOTES
"  SUBJECTIVE                                                    BUPRENORPHINE FOLLOW UP:    Justus Cope is a 50 year old male who presents to clinic today for follow up of Buprenorphine.        HPI Jan 6, 2021  SE: none  - No benzo use. Sips of wine 2 days ago. Reported drinking 2 beers around [place] due to overworking. Believes this was prior to our last appointment; he clearly reported NO alcohol use at our last visit. Insisted my recording was inaccurate.  - He does acknowledge \"some\" alcohol use as above. Continues to acknowledge the request for ZERO alcohol use per previous notes but has no clear explanation for not following this request.  - Uses valerian root regularly for anxiety; he believes this is the cause of his positive benzo screens  - Notes no significant improvement in anxiety/irritability, but denies this as a functional problem in life. Isn't sure if he is seeing benefit from paxil or not. Mood stable without concern        Chart Reviewed - Interval History:   Reviewed with patient for clarification and further details  - none    ROS:  Complete, 10 point ROS completed. Negative except:  - substance use as above      A/P                                                    ASSESSMENT  Justus was seen today for addiction problem.    Diagnoses and all orders for this visit:    Opioid use disorder (H)  -     Urine Drugs of Abuse Screen Panel 13  -     Cancel: Ethyl Glucuronide Urine  -     Cancel: Benzodiazepine Confirmatory Urine Qual  -     buprenorphine-naloxone (SUBOXONE) 2-0.5 MG SUBL sublingual tablet; Place 1 tablet under the tongue 2 times daily    Alcohol consumption binge drinking  -     Cancel: Ethyl Glucuronide Urine      #30 tabs prescribed    - Labs cancelled to be entered in different context for billing purposes; all completed as above  - Reinfoced need for complete abstinence; Steven continues to talk around this without addressing it.      PLAN (includes SHx)  OUD treatment plan " updated Jan 6, 2021 and reviewed with patient:    Problem   Opioid Use Disorder (H)    OUD Treatment Plan, with historical details (incl SHx)    Rx details: suboxone 2-0.5mg tabs, 1 tab BID   F/up: 2 weeks       Severity indicators (O/D, IVDU, etc): No severe features. Using his partner's subutex and valium, other benzos, and bingeing on alcohol often   Medical complications: None; depression/anxiety present       Housing:  Stable, lives with partner in [facility] Capital: none   Employment: Full-time job in refrigeration   Legal:  none   Other social barriers:  Distance to clinic   Consent to Communicate? Partner; no consent signed, but verbally on 1st visit. No contact info       Tx history None; started on suboxone @ [facility] Nov 23, 2020   Current Psychosocial tx:  none     - Needs twice monthly urine drug screens at a minimum given his ongoing alcohol use and intermittent benzo use with inconsistent reporting of use    Recent Changes:  Dec 2, 2020 - Reinforced appropriate dosing strategy. F/up next week with alcohol/benzo testing. Patient struggling with complete abstinence.  Dec 7, 2020 - Will attempt decreasing suboxone to 2mg BID dose to see if this improves diarrhea. Advised we will not consider switch to subutex unless he can prove alcohol abstinence. Need to continue testing for benzo quant if urine screen remains positive at 2wk f/up  Dec 23, 2020 - No SE with decreased dose. Paxil with minimal help, SE abated; monitor for another 4wk. Denies any alcohol/benzo use. Benzo confirmation negative last visit; will check confirmation again at next visit if remains positive; likely FALSE POSITIVE, but unclear source  Jan 6, 2021 - No further SE. Reporting use of valerian root causing false positive screen; need to verify. Paxil helping some with anxiety. Benzo confirmation sent. Reinforcing need for NO alcohol before spreading out appointments.         - lab results indicating benzo and alcohol use.  Consistent denial of benzo use and avoidance of abstinence as recommended/indicated.    Steven is at high risk for accidental overdose d/t use of 3 different sedatives. Unclear if benefits > risks at this time given his inaccurate reporting of use. Will continue to closely assess. Lower buprenorphine dose has been inadequate for symptom mgmt. If he cannot demonstrate consistent abstinence, will need to enter [facility] programming to continue with maintenance suboxone.      RTC:  Future Appointments   Date Time Provider Department Center   1/20/2021  9:00 AM [facility]             ENCOUNTER FOR LONG TERM USE OF HIGH RISK MEDICATION   High Risk Drug Monitoring?  YES   Drug being monitored: Buprenorphine   Reason for drug: Opioid Use Disorder   What is being monitored?: Dosage, Cravings, Trigger, side effects, and continued abstinence.   Patient has narcan supply at home    Counseled the patient on the importance of having a recovery program in addition to medication to manage recovery.  Components include avoiding isolating, having willingness to change, avoiding triggers and managing cravings. Encouraged having some type of sober network and practicing honesty with trusted support person(s). Encouraged other services such as counseling, 12 step or other self-help organizations.      Opioid warning reviewed.  Risk of overdose following a period of abstinence due to decrease tolerance was discussed including risk of death.  Strongly recommended abstain from alcohol, benzodiazepines, THC, opioids and other drugs of abuse.  Increased risk of return to opioid use after use of these substances discussed.  Increased risk of overdose/death with use of other substances particularly benzodiazepines/alcohol reviewed.        HISTORY                                                    Problem list reviewed & adjusted, as indicated.  Patient Active Problem List   Diagnosis     Opioid use disorder (H)     Chronic neck  pain       PMH, FMH reviewed and updated in [facility].  Social History per Tx plan and HPI, as above.    MEDICATION LIST (prior to visit)       PARoxetine (PAXIL) 10 MG tablet, Take 1 tablet (10 mg) by mouth every morning Take 1/2 tablet for the first 3-5 days    No current facility-administered medications on file prior to visit.       No Known Allergies      OBJECTIVE                                                    PHYSICAL EXAM:  BP (!) 156/102   Pulse 81   Temp 97.5  F (36.4  C) (Oral)   Resp 16   Wt 47.4 kg (104 lb 6.4 oz)   SpO2 96%   BMI 15.87 kg/m      GENERAL APPEARANCE:  alert, comfortable appearing  EYES:Eyes grossly normal to inspection  NEURO:  Gait normal.  No tremor. Coordination intact.   MENTAL STATUS EXAM:  Appearance/Behavior: No appearant distress  Speech: Normal  Mood/Affect: normal affect  Insight: Adequate    LAB  Results for orders placed or performed in visit on 01/06/21   Ethyl Glucuronide Urine     Status: None   Result Value Ref Range    Ethyl Glucuronide Urine Positive      Benzodiazepine Confirmatory Urine Qual     Status: None   Result Value Ref Range    Desmethyldiazepam Positive     Oxazepam Qual Positive     Temazepam Positive     Alprazolam Negative     Alpha-OH-alprazolam Negative     Lorazepam Qual Negative    Ethyl Glucuronide Conf     Status: None   Result Value Ref Range    Ethyl Glucuronide Qnt >10,000 ng/mL    Ethyl Sulfate Qnt >5,000 ng/mL   Results for orders placed or performed in visit on 01/06/21   Urine Drugs of Abuse Screen Panel 13     Status: Abnormal   Result Value Ref Range    Cannabinoids (50-dgn-6-carboxy-9-THC) Not Detected NDET^Not Detected ng/mL    Phencyclidine (Phencyclidine) Not Detected NDET^Not Detected ng/mL    Cocaine (Benzoylecgonine) Not Detected NDET^Not Detected ng/mL    Methamphetamine (d-Methamphetamine) Not Detected NDET^Not Detected ng/mL    Opiates (Morphine) Not Detected NDET^Not Detected ng/mL    Amphetamine (d-Amphetamine) Not Detected  NDET^Not Detected ng/mL    Benzodiazepines (Nordiazepam) Detected, Abnormal Result (A) NDET^Not Detected ng/mL    Tricyclic Antidepressants (Desipramine) Not Detected NDET^Not Detected ng/mL    Methadone (Methadone) Not Detected NDET^Not Detected ng/mL    Barbiturates (Butalbital) Not Detected NDET^Not Detected ng/mL    Oxycodone (Oxycodone) Not Detected NDET^Not Detected ng/mL    Propoxyphene (Norpropoxyphene) Not Detected NDET^Not Detected ng/mL    Buprenorphine (Buprenorphine) Detected, Abnormal Result (A) NDET^Not Detected ng/mL       [facility] Board of Pharmacy Data Base Reviewed;    Consistent with patient reports and Epic records.      Jesu Arciniega MD  [facility]  [phone]

## 2021-01-09 LAB
A-OH ALPRAZ UR QL CFM: NEGATIVE
ALPRAZ UR QL CFM: NEGATIVE
ETHYL GLUCURONIDE UR QL: POSITIVE
ETHYL GLUCURONIDE UR-MCNC: NORMAL NG/ML
ETHYL SULFATE UR-MCNC: >5000 NG/ML
LORAZEPAM UR QL CFM: NEGATIVE
NORDIAZEPAM UR QL CFM: POSITIVE
OXAZEPAM UR QL CFM: POSITIVE
TEMAZEPAM UR QL CFM: POSITIVE

## 2021-01-20 ENCOUNTER — OFFICE VISIT (OUTPATIENT)
Dept: ADDICTION MEDICINE | Facility: CLINIC | Age: 51
End: 2021-01-20
Payer: COMMERCIAL

## 2021-01-20 VITALS
WEIGHT: 103 LBS | OXYGEN SATURATION: 99 % | HEART RATE: 106 BPM | SYSTOLIC BLOOD PRESSURE: 136 MMHG | DIASTOLIC BLOOD PRESSURE: 86 MMHG | BODY MASS INDEX: 15.66 KG/M2 | TEMPERATURE: 98.7 F

## 2021-01-20 DIAGNOSIS — F41.8 DEPRESSION WITH ANXIETY: ICD-10-CM

## 2021-01-20 DIAGNOSIS — F10.10 ALCOHOL CONSUMPTION BINGE DRINKING: ICD-10-CM

## 2021-01-20 DIAGNOSIS — F11.90 OPIOID USE DISORDER: Primary | ICD-10-CM

## 2021-01-20 PROCEDURE — 80346 BENZODIAZEPINES1-12: CPT | Mod: 90 | Performed by: FAMILY MEDICINE

## 2021-01-20 PROCEDURE — 99000 SPECIMEN HANDLING OFFICE-LAB: CPT | Performed by: FAMILY MEDICINE

## 2021-01-20 PROCEDURE — 80306 DRUG TEST PRSMV INSTRMNT: CPT | Performed by: FAMILY MEDICINE

## 2021-01-20 PROCEDURE — 99214 OFFICE O/P EST MOD 30 MIN: CPT | Performed by: FAMILY MEDICINE

## 2021-01-20 RX ORDER — BUPRENORPHINE HYDROCHLORIDE AND NALOXONE HYDROCHLORIDE DIHYDRATE 2; .5 MG/1; MG/1
1 TABLET SUBLINGUAL 2 TIMES DAILY
Qty: 30 TABLET | Refills: 0 | Status: SHIPPED | OUTPATIENT
Start: 2021-01-20 | End: 2021-02-03
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
## 2021-01-23 LAB
A-OH ALPRAZ UR QL CFM: NEGATIVE
ALPRAZ UR QL CFM: NEGATIVE
LORAZEPAM UR QL CFM: NEGATIVE
NORDIAZEPAM UR QL CFM: NEGATIVE
OXAZEPAM UR QL CFM: POSITIVE
TEMAZEPAM UR QL CFM: POSITIVE
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
## 2021-02-03 ENCOUNTER — OFFICE VISIT (OUTPATIENT)
Dept: ADDICTION MEDICINE | Facility: CLINIC | Age: 51
End: 2021-02-03
Payer: COMMERCIAL

## 2021-02-03 VITALS
DIASTOLIC BLOOD PRESSURE: 74 MMHG | TEMPERATURE: 98.4 F | OXYGEN SATURATION: 98 % | SYSTOLIC BLOOD PRESSURE: 122 MMHG | HEART RATE: 82 BPM | BODY MASS INDEX: 15.39 KG/M2 | WEIGHT: 101.2 LBS

## 2021-02-03 DIAGNOSIS — F10.10 ALCOHOL USE DISORDER, MILD, ABUSE: ICD-10-CM

## 2021-02-03 DIAGNOSIS — F41.8 DEPRESSION WITH ANXIETY: ICD-10-CM

## 2021-02-03 DIAGNOSIS — F10.10 ALCOHOL CONSUMPTION BINGE DRINKING: ICD-10-CM

## 2021-02-03 DIAGNOSIS — F11.90 OPIOID USE DISORDER: ICD-10-CM

## 2021-02-03 DIAGNOSIS — F11.90 OPIOID USE DISORDER: Primary | ICD-10-CM

## 2021-02-03 PROCEDURE — 80346 BENZODIAZEPINES1-12: CPT | Mod: 90 | Performed by: FAMILY MEDICINE

## 2021-02-03 PROCEDURE — 99214 OFFICE O/P EST MOD 30 MIN: CPT | Performed by: FAMILY MEDICINE

## 2021-02-03 PROCEDURE — 80307 DRUG TEST PRSMV CHEM ANLYZR: CPT | Mod: 90 | Performed by: FAMILY MEDICINE

## 2021-02-03 RX ORDER — PAROXETINE 10 MG/1
10 TABLET, FILM COATED ORAL EVERY MORNING
Qty: 30 TABLET | Refills: 3 | Status: SHIPPED | OUTPATIENT
Start: 2021-02-03 | End: 2021-02-17

## 2021-02-03 RX ORDER — BUPRENORPHINE HYDROCHLORIDE AND NALOXONE HYDROCHLORIDE DIHYDRATE 2; .5 MG/1; MG/1
1 TABLET SUBLINGUAL 2 TIMES DAILY
Qty: 30 TABLET | Refills: 0 | Status: SHIPPED | OUTPATIENT
Start: 2021-02-03 | End: 2021-02-17

## 2021-02-11 LAB
A-OH ALPRAZ UR QL CFM: NEGATIVE
ALPRAZ UR QL CFM: NEGATIVE
LORAZEPAM UR QL CFM: NEGATIVE
NORDIAZEPAM UR QL CFM: NEGATIVE
OXAZEPAM UR QL CFM: POSITIVE
TEMAZEPAM UR QL CFM: NEGATIVE

## 2021-02-17 ENCOUNTER — OFFICE VISIT (OUTPATIENT)
Dept: ADDICTION MEDICINE | Facility: CLINIC | Age: 51
End: 2021-02-17
Payer: COMMERCIAL

## 2021-02-17 VITALS
WEIGHT: 102 LBS | TEMPERATURE: 98.1 F | BODY MASS INDEX: 15.51 KG/M2 | SYSTOLIC BLOOD PRESSURE: 144 MMHG | DIASTOLIC BLOOD PRESSURE: 78 MMHG | HEART RATE: 94 BPM | OXYGEN SATURATION: 99 %

## 2021-02-17 DIAGNOSIS — F10.10 ALCOHOL USE DISORDER, MILD, ABUSE: ICD-10-CM

## 2021-02-17 DIAGNOSIS — F17.200 TOBACCO DEPENDENCE SYNDROME: ICD-10-CM

## 2021-02-17 DIAGNOSIS — F11.90 OPIOID USE DISORDER: ICD-10-CM

## 2021-02-17 DIAGNOSIS — F11.90 OPIOID USE DISORDER: Primary | ICD-10-CM

## 2021-02-17 DIAGNOSIS — F41.8 DEPRESSION WITH ANXIETY: ICD-10-CM

## 2021-02-17 PROCEDURE — 36415 COLL VENOUS BLD VENIPUNCTURE: CPT | Performed by: FAMILY MEDICINE

## 2021-02-17 PROCEDURE — 80307 DRUG TEST PRSMV CHEM ANLYZR: CPT | Mod: 90 | Performed by: FAMILY MEDICINE

## 2021-02-17 PROCEDURE — 80306 DRUG TEST PRSMV INSTRMNT: CPT | Performed by: FAMILY MEDICINE

## 2021-02-17 PROCEDURE — 99000 SPECIMEN HANDLING OFFICE-LAB: CPT | Performed by: FAMILY MEDICINE

## 2021-02-17 PROCEDURE — 80346 BENZODIAZEPINES1-12: CPT | Mod: 90 | Performed by: FAMILY MEDICINE

## 2021-02-17 PROCEDURE — 99214 OFFICE O/P EST MOD 30 MIN: CPT | Performed by: FAMILY MEDICINE

## 2021-02-17 RX ORDER — BUPRENORPHINE HYDROCHLORIDE AND NALOXONE HYDROCHLORIDE DIHYDRATE 2; .5 MG/1; MG/1
1 TABLET SUBLINGUAL 2 TIMES DAILY
Qty: 30 TABLET | Refills: 0 | Status: SHIPPED | OUTPATIENT
Start: 2021-02-17 | End: 2021-03-03

## 2021-02-17 RX ORDER — PAROXETINE 20 MG/1
20 TABLET, FILM COATED ORAL EVERY MORNING
Qty: 30 TABLET | Refills: 1 | Status: SHIPPED | OUTPATIENT
Start: 2021-02-17 | End: 2021-04-14
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
## 2021-02-20 LAB
ETHYL GLUCURONIDE UR QL: POSITIVE
ETHYL GLUCURONIDE UR-MCNC: NORMAL NG/ML
ETHYL SULFATE UR-MCNC: 8848 NG/ML

## 2021-02-21 LAB
A-OH ALPRAZ UR QL CFM: NEGATIVE
ALPRAZ UR QL CFM: NEGATIVE
LORAZEPAM UR QL CFM: NEGATIVE
NORDIAZEPAM UR QL CFM: NEGATIVE
OXAZEPAM UR QL CFM: NEGATIVE
TEMAZEPAM UR QL CFM: NEGATIVE

## 2021-03-03 ENCOUNTER — OFFICE VISIT (OUTPATIENT)
Dept: ADDICTION MEDICINE | Facility: CLINIC | Age: 51
End: 2021-03-03
Payer: COMMERCIAL

## 2021-03-03 VITALS
SYSTOLIC BLOOD PRESSURE: 152 MMHG | TEMPERATURE: 97.9 F | HEART RATE: 77 BPM | DIASTOLIC BLOOD PRESSURE: 80 MMHG | BODY MASS INDEX: 15.16 KG/M2 | WEIGHT: 99.7 LBS | OXYGEN SATURATION: 99 %

## 2021-03-03 DIAGNOSIS — F10.10 ALCOHOL USE DISORDER, MILD, ABUSE: ICD-10-CM

## 2021-03-03 DIAGNOSIS — F11.90 OPIOID USE DISORDER: Primary | ICD-10-CM

## 2021-03-03 DIAGNOSIS — F11.90 OPIOID USE DISORDER: ICD-10-CM

## 2021-03-03 LAB
AMPHETAMINES UR QL: NOT DETECTED NG/ML
BARBITURATES UR QL SCN: NOT DETECTED NG/ML
BENZODIAZ UR QL SCN: NOT DETECTED NG/ML
BUPRENORPHINE UR QL: ABNORMAL NG/ML
CANNABINOIDS UR QL: NOT DETECTED NG/ML
COCAINE UR QL SCN: NOT DETECTED NG/ML
D-METHAMPHET UR QL: NOT DETECTED NG/ML
METHADONE UR QL SCN: NOT DETECTED NG/ML
OPIATES UR QL SCN: NOT DETECTED NG/ML
OXYCODONE UR QL SCN: NOT DETECTED NG/ML
PCP UR QL SCN: NOT DETECTED NG/ML
PROPOXYPH UR QL: NOT DETECTED NG/ML
TRICYCLICS UR QL SCN: NOT DETECTED NG/ML

## 2021-03-03 PROCEDURE — 99000 SPECIMEN HANDLING OFFICE-LAB: CPT | Performed by: FAMILY MEDICINE

## 2021-03-03 PROCEDURE — 2894A VOIDCORRECT: CPT | Performed by: FAMILY MEDICINE

## 2021-03-03 PROCEDURE — 80307 DRUG TEST PRSMV CHEM ANLYZR: CPT | Mod: 90 | Performed by: FAMILY MEDICINE

## 2021-03-03 PROCEDURE — 36415 COLL VENOUS BLD VENIPUNCTURE: CPT | Performed by: FAMILY MEDICINE

## 2021-03-03 PROCEDURE — 99214 OFFICE O/P EST MOD 30 MIN: CPT | Performed by: FAMILY MEDICINE

## 2021-03-03 RX ORDER — BUPRENORPHINE HYDROCHLORIDE AND NALOXONE HYDROCHLORIDE DIHYDRATE 2; .5 MG/1; MG/1
1 TABLET SUBLINGUAL 2 TIMES DAILY
Qty: 42 TABLET | Refills: 0 | Status: SHIPPED | OUTPATIENT
Start: 2021-03-03 | End: 2021-03-24
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
## 2021-03-06 LAB
ETHYL GLUCURONIDE UR QL: POSITIVE
ETHYL GLUCURONIDE UR-MCNC: NORMAL NG/ML
ETHYL SULFATE UR-MCNC: 6393 NG/ML

## 2021-03-07 ENCOUNTER — HEALTH MAINTENANCE LETTER (OUTPATIENT)
Age: 51
End: 2021-03-07

## 2021-03-24 ENCOUNTER — OFFICE VISIT (OUTPATIENT)
Dept: ADDICTION MEDICINE | Facility: CLINIC | Age: 51
End: 2021-03-24
Payer: COMMERCIAL

## 2021-03-24 VITALS
TEMPERATURE: 98.1 F | HEART RATE: 82 BPM | WEIGHT: 102.38 LBS | RESPIRATION RATE: 16 BRPM | OXYGEN SATURATION: 98 % | BODY MASS INDEX: 15.52 KG/M2 | SYSTOLIC BLOOD PRESSURE: 132 MMHG | DIASTOLIC BLOOD PRESSURE: 80 MMHG | HEIGHT: 68 IN

## 2021-03-24 DIAGNOSIS — F10.10 ALCOHOL USE DISORDER, MILD, ABUSE: ICD-10-CM

## 2021-03-24 DIAGNOSIS — F11.90 OPIOID USE DISORDER: Primary | ICD-10-CM

## 2021-03-24 DIAGNOSIS — F41.8 DEPRESSION WITH ANXIETY: ICD-10-CM

## 2021-03-24 PROCEDURE — 80306 DRUG TEST PRSMV INSTRMNT: CPT | Performed by: FAMILY MEDICINE

## 2021-03-24 PROCEDURE — 80346 BENZODIAZEPINES1-12: CPT | Mod: 90 | Performed by: FAMILY MEDICINE

## 2021-03-24 PROCEDURE — 99214 OFFICE O/P EST MOD 30 MIN: CPT | Performed by: FAMILY MEDICINE

## 2021-03-24 PROCEDURE — 99000 SPECIMEN HANDLING OFFICE-LAB: CPT | Performed by: FAMILY MEDICINE

## 2021-03-24 RX ORDER — BUPRENORPHINE HYDROCHLORIDE AND NALOXONE HYDROCHLORIDE DIHYDRATE 2; .5 MG/1; MG/1
1 TABLET SUBLINGUAL 2 TIMES DAILY
Qty: 42 TABLET | Refills: 0 | Status: SHIPPED | OUTPATIENT
Start: 2021-03-24 | End: 2021-04-14

## 2021-03-24 ASSESSMENT — MIFFLIN-ST. JEOR: SCORE: 1293.74
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
## 2021-04-14 ENCOUNTER — OFFICE VISIT (OUTPATIENT)
Dept: ADDICTION MEDICINE | Facility: CLINIC | Age: 51
End: 2021-04-14
Payer: COMMERCIAL

## 2021-04-14 VITALS
WEIGHT: 100 LBS | BODY MASS INDEX: 15.21 KG/M2 | OXYGEN SATURATION: 97 % | HEART RATE: 87 BPM | DIASTOLIC BLOOD PRESSURE: 82 MMHG | SYSTOLIC BLOOD PRESSURE: 134 MMHG | TEMPERATURE: 97.8 F

## 2021-04-14 DIAGNOSIS — F10.10 ALCOHOL CONSUMPTION BINGE DRINKING: ICD-10-CM

## 2021-04-14 DIAGNOSIS — F11.90 OPIOID USE DISORDER: ICD-10-CM

## 2021-04-14 DIAGNOSIS — F11.90 OPIOID USE DISORDER: Primary | ICD-10-CM

## 2021-04-14 DIAGNOSIS — F41.8 DEPRESSION WITH ANXIETY: ICD-10-CM

## 2021-04-14 DIAGNOSIS — F10.10 ALCOHOL USE DISORDER, MILD, ABUSE: ICD-10-CM

## 2021-04-14 PROCEDURE — 2894A VOIDCORRECT: CPT | Performed by: FAMILY MEDICINE

## 2021-04-14 PROCEDURE — 99000 SPECIMEN HANDLING OFFICE-LAB: CPT | Performed by: FAMILY MEDICINE

## 2021-04-14 PROCEDURE — 80307 DRUG TEST PRSMV CHEM ANLYZR: CPT | Mod: 90 | Performed by: FAMILY MEDICINE

## 2021-04-14 PROCEDURE — 36415 COLL VENOUS BLD VENIPUNCTURE: CPT | Performed by: FAMILY MEDICINE

## 2021-04-14 PROCEDURE — 99214 OFFICE O/P EST MOD 30 MIN: CPT | Performed by: FAMILY MEDICINE

## 2021-04-14 RX ORDER — ONDANSETRON 4 MG/1
4 TABLET, ORALLY DISINTEGRATING ORAL EVERY 8 HOURS PRN
Qty: 15 TABLET | Refills: 1 | Status: SHIPPED | OUTPATIENT
Start: 2021-04-14 | End: 2021-08-05

## 2021-04-14 RX ORDER — BUPRENORPHINE HYDROCHLORIDE AND NALOXONE HYDROCHLORIDE DIHYDRATE 2; .5 MG/1; MG/1
1 TABLET SUBLINGUAL 2 TIMES DAILY
Qty: 42 TABLET | Refills: 0 | Status: SHIPPED | OUTPATIENT
Start: 2021-04-14 | End: 2021-05-05

## 2021-04-14 RX ORDER — PAROXETINE 20 MG/1
20 TABLET, FILM COATED ORAL EVERY MORNING
Qty: 30 TABLET | Refills: 1 | Status: SHIPPED | OUTPATIENT
Start: 2021-04-14 | End: 2021-07-07
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
## 2021-04-20 LAB
ETHYL GLUCURONIDE UR QL: POSITIVE
ETHYL GLUCURONIDE UR-MCNC: NEGATIVE NG/ML
ETHYL SULFATE UR-MCNC: 258 NG/ML

## 2021-05-05 ENCOUNTER — OFFICE VISIT (OUTPATIENT)
Dept: ADDICTION MEDICINE | Facility: CLINIC | Age: 51
End: 2021-05-05
Payer: COMMERCIAL

## 2021-05-05 VITALS
OXYGEN SATURATION: 98 % | SYSTOLIC BLOOD PRESSURE: 128 MMHG | HEIGHT: 68 IN | TEMPERATURE: 97.9 F | HEART RATE: 82 BPM | BODY MASS INDEX: 15.13 KG/M2 | WEIGHT: 99.8 LBS | DIASTOLIC BLOOD PRESSURE: 82 MMHG

## 2021-05-05 DIAGNOSIS — F10.10 ALCOHOL USE DISORDER, MILD, ABUSE: ICD-10-CM

## 2021-05-05 DIAGNOSIS — F17.200 TOBACCO DEPENDENCE SYNDROME: ICD-10-CM

## 2021-05-05 DIAGNOSIS — G89.29 CHRONIC NECK PAIN: ICD-10-CM

## 2021-05-05 DIAGNOSIS — M54.2 CHRONIC NECK PAIN: ICD-10-CM

## 2021-05-05 DIAGNOSIS — F41.8 DEPRESSION WITH ANXIETY: ICD-10-CM

## 2021-05-05 DIAGNOSIS — F11.90 OPIOID USE DISORDER: ICD-10-CM

## 2021-05-05 DIAGNOSIS — F11.90 OPIOID USE DISORDER: Primary | ICD-10-CM

## 2021-05-05 PROCEDURE — 80306 DRUG TEST PRSMV INSTRMNT: CPT | Performed by: FAMILY MEDICINE

## 2021-05-05 PROCEDURE — 80307 DRUG TEST PRSMV CHEM ANLYZR: CPT | Mod: 90 | Performed by: FAMILY MEDICINE

## 2021-05-05 PROCEDURE — 99214 OFFICE O/P EST MOD 30 MIN: CPT | Performed by: FAMILY MEDICINE

## 2021-05-05 PROCEDURE — 80346 BENZODIAZEPINES1-12: CPT | Mod: 90 | Performed by: FAMILY MEDICINE

## 2021-05-05 PROCEDURE — 99000 SPECIMEN HANDLING OFFICE-LAB: CPT | Performed by: FAMILY MEDICINE

## 2021-05-05 PROCEDURE — 36415 COLL VENOUS BLD VENIPUNCTURE: CPT | Performed by: FAMILY MEDICINE

## 2021-05-05 RX ORDER — BUPRENORPHINE HYDROCHLORIDE AND NALOXONE HYDROCHLORIDE DIHYDRATE 2; .5 MG/1; MG/1
1 TABLET SUBLINGUAL 2 TIMES DAILY
Qty: 42 TABLET | Refills: 0 | Status: SHIPPED | OUTPATIENT
Start: 2021-05-05 | End: 2021-05-26

## 2021-05-05 ASSESSMENT — MIFFLIN-ST. JEOR: SCORE: 1282.19
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
## 2021-05-13 ENCOUNTER — TELEPHONE (OUTPATIENT)
Dept: FAMILY MEDICINE | Facility: CLINIC | Age: 51
End: 2021-05-13

## 2021-05-13 NOTE — TELEPHONE ENCOUNTER
Prior Authorization Retail Medication Request    Medication/Dose: nicotine (NICOTROL) 10 MG inhaler  ICD code (if different than what is on RX):  Tobacco dependence syndrome [F17.200]  Previously Tried and Failed:   Rationale:      Insurance Name:   Insurance ID:      Pharmacy Information (if different than what is on RX)  Name:  [facility] - [facility] [address]  Phone:  [phone]

## 2021-05-14 NOTE — TELEPHONE ENCOUNTER
PA Initiation    Medication: nicotine (NICOTROL) 10 MG inhaler   Insurance Company: [employer] - Phone [phone] Fax [phone]  Pharmacy Filling the Rx: [facility] - [place], [place] - [address] [place] [address] [address]  Filling Pharmacy Phone: [phone]  Filling Pharmacy Fax: [phone]  Start Date: 5/14/2021

## 2021-05-17 NOTE — TELEPHONE ENCOUNTER
PRIOR AUTHORIZATION DENIED    Medication: nicotine (NICOTROL) 10 MG inhaler--DENIED    Denial Date: 5/15/2021    Denial Rational:

## 2021-05-26 ENCOUNTER — OFFICE VISIT (OUTPATIENT)
Dept: ADDICTION MEDICINE | Facility: CLINIC | Age: 51
End: 2021-05-26
Payer: COMMERCIAL

## 2021-05-26 VITALS
HEART RATE: 94 BPM | BODY MASS INDEX: 15.05 KG/M2 | TEMPERATURE: 96.8 F | OXYGEN SATURATION: 97 % | SYSTOLIC BLOOD PRESSURE: 154 MMHG | DIASTOLIC BLOOD PRESSURE: 80 MMHG | WEIGHT: 99 LBS

## 2021-05-26 DIAGNOSIS — M54.2 CHRONIC NECK PAIN: ICD-10-CM

## 2021-05-26 DIAGNOSIS — F41.8 DEPRESSION WITH ANXIETY: ICD-10-CM

## 2021-05-26 DIAGNOSIS — F11.90 OPIOID USE DISORDER: Primary | ICD-10-CM

## 2021-05-26 DIAGNOSIS — G89.29 CHRONIC NECK PAIN: ICD-10-CM

## 2021-05-26 DIAGNOSIS — F11.90 OPIOID USE DISORDER: ICD-10-CM

## 2021-05-26 DIAGNOSIS — F10.10 ALCOHOL USE DISORDER, MILD, ABUSE: ICD-10-CM

## 2021-05-26 DIAGNOSIS — F17.200 TOBACCO DEPENDENCE SYNDROME: ICD-10-CM

## 2021-05-26 PROCEDURE — 99000 SPECIMEN HANDLING OFFICE-LAB: CPT | Performed by: FAMILY MEDICINE

## 2021-05-26 PROCEDURE — 80307 DRUG TEST PRSMV CHEM ANLYZR: CPT | Mod: 90 | Performed by: FAMILY MEDICINE

## 2021-05-26 PROCEDURE — 36415 COLL VENOUS BLD VENIPUNCTURE: CPT | Performed by: FAMILY MEDICINE

## 2021-05-26 PROCEDURE — 99214 OFFICE O/P EST MOD 30 MIN: CPT | Performed by: FAMILY MEDICINE

## 2021-05-26 PROCEDURE — 2894A VOIDCORRECT: CPT | Performed by: FAMILY MEDICINE

## 2021-05-26 RX ORDER — BUPRENORPHINE HYDROCHLORIDE AND NALOXONE HYDROCHLORIDE DIHYDRATE 2; .5 MG/1; MG/1
1 TABLET SUBLINGUAL 2 TIMES DAILY
Qty: 42 TABLET | Refills: 0 | Status: SHIPPED | OUTPATIENT
Start: 2021-05-26 | End: 2021-06-16
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
## 2021-06-16 ENCOUNTER — OFFICE VISIT (OUTPATIENT)
Dept: ADDICTION MEDICINE | Facility: CLINIC | Age: 51
End: 2021-06-16
Payer: COMMERCIAL

## 2021-06-16 VITALS
DIASTOLIC BLOOD PRESSURE: 78 MMHG | WEIGHT: 99.8 LBS | BODY MASS INDEX: 15.17 KG/M2 | TEMPERATURE: 96.9 F | SYSTOLIC BLOOD PRESSURE: 148 MMHG | OXYGEN SATURATION: 98 % | HEART RATE: 92 BPM

## 2021-06-16 DIAGNOSIS — F17.200 TOBACCO DEPENDENCE SYNDROME: ICD-10-CM

## 2021-06-16 DIAGNOSIS — F10.10 ALCOHOL USE DISORDER, MILD, ABUSE: ICD-10-CM

## 2021-06-16 DIAGNOSIS — F11.90 OPIOID USE DISORDER: Primary | ICD-10-CM

## 2021-06-16 DIAGNOSIS — F41.8 DEPRESSION WITH ANXIETY: ICD-10-CM

## 2021-06-16 DIAGNOSIS — F10.10 ALCOHOL CONSUMPTION BINGE DRINKING: ICD-10-CM

## 2021-06-16 DIAGNOSIS — F11.90 OPIOID USE DISORDER: ICD-10-CM

## 2021-06-16 PROCEDURE — 99214 OFFICE O/P EST MOD 30 MIN: CPT | Performed by: FAMILY MEDICINE

## 2021-06-16 PROCEDURE — 36415 COLL VENOUS BLD VENIPUNCTURE: CPT | Performed by: FAMILY MEDICINE

## 2021-06-16 PROCEDURE — 80306 DRUG TEST PRSMV INSTRMNT: CPT | Performed by: FAMILY MEDICINE

## 2021-06-16 PROCEDURE — 99000 SPECIMEN HANDLING OFFICE-LAB: CPT | Performed by: FAMILY MEDICINE

## 2021-06-16 PROCEDURE — 80346 BENZODIAZEPINES1-12: CPT | Mod: 90 | Performed by: FAMILY MEDICINE

## 2021-06-16 PROCEDURE — 80307 DRUG TEST PRSMV CHEM ANLYZR: CPT | Mod: 90 | Performed by: FAMILY MEDICINE

## 2021-06-16 RX ORDER — BUPRENORPHINE HYDROCHLORIDE AND NALOXONE HYDROCHLORIDE DIHYDRATE 2; .5 MG/1; MG/1
1 TABLET SUBLINGUAL 2 TIMES DAILY
Qty: 42 TABLET | Refills: 0 | Status: SHIPPED | OUTPATIENT
Start: 2021-06-16 | End: 2021-07-07
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
## 2021-07-07 ENCOUNTER — OFFICE VISIT (OUTPATIENT)
Dept: ADDICTION MEDICINE | Facility: CLINIC | Age: 51
End: 2021-07-07
Payer: COMMERCIAL

## 2021-07-07 VITALS
HEART RATE: 92 BPM | TEMPERATURE: 97 F | HEIGHT: 68 IN | WEIGHT: 102.8 LBS | DIASTOLIC BLOOD PRESSURE: 76 MMHG | BODY MASS INDEX: 15.58 KG/M2 | SYSTOLIC BLOOD PRESSURE: 126 MMHG | OXYGEN SATURATION: 98 %

## 2021-07-07 DIAGNOSIS — F11.90 OPIOID USE DISORDER: ICD-10-CM

## 2021-07-07 DIAGNOSIS — F11.90 OPIOID USE DISORDER: Primary | ICD-10-CM

## 2021-07-07 DIAGNOSIS — F17.200 TOBACCO DEPENDENCE SYNDROME: ICD-10-CM

## 2021-07-07 DIAGNOSIS — F41.8 DEPRESSION WITH ANXIETY: ICD-10-CM

## 2021-07-07 DIAGNOSIS — M54.2 CHRONIC NECK PAIN: ICD-10-CM

## 2021-07-07 DIAGNOSIS — G89.29 CHRONIC NECK PAIN: ICD-10-CM

## 2021-07-07 DIAGNOSIS — F10.10 ALCOHOL USE DISORDER, MILD, ABUSE: ICD-10-CM

## 2021-07-07 LAB
AMPHETAMINES UR QL: NOT DETECTED NG/ML
BARBITURATES UR QL SCN: NOT DETECTED NG/ML
BENZODIAZ UR QL SCN: ABNORMAL NG/ML
BUPRENORPHINE UR QL: ABNORMAL NG/ML
CANNABINOIDS UR QL: NOT DETECTED NG/ML
COCAINE UR QL SCN: NOT DETECTED NG/ML
D-METHAMPHET UR QL: NOT DETECTED NG/ML
FENTANYL UR QL: NEGATIVE
METHADONE UR QL SCN: NOT DETECTED NG/ML
OPIATES UR QL SCN: NOT DETECTED NG/ML
OXYCODONE UR QL SCN: NOT DETECTED NG/ML
PCP UR QL SCN: NOT DETECTED NG/ML
PROPOXYPH UR QL: NOT DETECTED NG/ML
TRICYCLICS UR QL SCN: NOT DETECTED NG/ML

## 2021-07-07 PROCEDURE — 80354 DRUG SCREENING FENTANYL: CPT | Performed by: FAMILY MEDICINE

## 2021-07-07 PROCEDURE — 99214 OFFICE O/P EST MOD 30 MIN: CPT | Performed by: FAMILY MEDICINE

## 2021-07-07 PROCEDURE — 80306 DRUG TEST PRSMV INSTRMNT: CPT | Performed by: FAMILY MEDICINE

## 2021-07-07 RX ORDER — PAROXETINE 20 MG/1
20 TABLET, FILM COATED ORAL EVERY MORNING
Qty: 30 TABLET | Refills: 1 | Status: SHIPPED | OUTPATIENT
Start: 2021-07-07 | End: 2021-11-19

## 2021-07-07 RX ORDER — BUPRENORPHINE HYDROCHLORIDE AND NALOXONE HYDROCHLORIDE DIHYDRATE 2; .5 MG/1; MG/1
1 TABLET SUBLINGUAL 2 TIMES DAILY
Qty: 42 TABLET | Refills: 0 | Status: SHIPPED | OUTPATIENT
Start: 2021-07-07 | End: 2021-07-22

## 2021-07-07 ASSESSMENT — MIFFLIN-ST. JEOR: SCORE: 1295.8
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
## 2021-07-21 ENCOUNTER — TELEPHONE (OUTPATIENT)
Dept: BEHAVIORAL HEALTH | Facility: CLINIC | Age: 51
End: 2021-07-21

## 2021-07-21 DIAGNOSIS — F10.10 ALCOHOL USE DISORDER, MILD, ABUSE: Primary | ICD-10-CM

## 2021-07-21 DIAGNOSIS — F11.90 OPIOID USE DISORDER: ICD-10-CM

## 2021-07-21 NOTE — TELEPHONE ENCOUNTER
Reason for call:  Medication     If this is a refill request, has the caller requested the refill from the pharmacy already? No     Will the patient be using a [facility]? No  Name of the pharmacy and phone number for the current request: [facility] - [place], [address]  Ph: [phone]    Name of the medication requested: buprenorphine-naloxone (SUBOXONE) 2-0.5 MG SUBL sublingual tablet    Other request: Pt states that for the last two weeks they have been taking more than their prescribed about of medication and are now running low (3 or 4 tabs left) and request an early refill. Pt states that 2 weeks ago they broke two ribs at work and were unaware an took extra to deal with the pain, just today they went to urgent care and the broken ribs were discovered, records should have been sent to you.     Phone number to reach patient:  Home number on file [phone] (home)    Best Time:  ASAP    Can we leave a detailed message on this number?  YES    Travel screening: Not Applicable

## 2021-07-21 NOTE — TELEPHONE ENCOUNTER
Reason for Call:  Other call back    Detailed comments: Patient called back, said best time to call is first thing in the morning. Confirmed [phone] is best number to call    Phone Number Patient can be reached at: Cell number on file:    Telephone Information:   Mobile [phone]       Best Time: Morning    Can we leave a detailed message on this number? No, he will answer    Call taken on 7/21/2021 at 5:49 PM by Renae Shukla

## 2021-07-21 NOTE — TELEPHONE ENCOUNTER
Pt picked up a 21-day supply of Suboxone on 7/7/21. He should have ran out of med on 7/28/21.    Attempted to reach pt via phone call to gather additional details. Pt did not answer call. LVM. Waiting for a call back.

## 2021-07-22 ENCOUNTER — TELEPHONE (OUTPATIENT)
Dept: ADDICTION MEDICINE | Facility: CLINIC | Age: 51
End: 2021-07-22

## 2021-07-22 DIAGNOSIS — F11.90 OPIOID USE DISORDER: Primary | ICD-10-CM

## 2021-07-22 RX ORDER — BUPRENORPHINE HYDROCHLORIDE AND NALOXONE HYDROCHLORIDE DIHYDRATE 8; 2 MG/1; MG/1
TABLET SUBLINGUAL
Qty: 7 TABLET | Refills: 0 | Status: SHIPPED | OUTPATIENT
Start: 2021-07-22 | End: 2021-07-28 | Stop reason: DRUGHIGH

## 2021-07-22 RX ORDER — BUPRENORPHINE HYDROCHLORIDE AND NALOXONE HYDROCHLORIDE DIHYDRATE 2; .5 MG/1; MG/1
1 TABLET SUBLINGUAL 4 TIMES DAILY
Qty: 24 TABLET | Refills: 0 | Status: SHIPPED | OUTPATIENT
Start: 2021-07-22 | End: 2021-07-28

## 2021-07-22 NOTE — TELEPHONE ENCOUNTER
Reason for Call:  Medication or medication refill:    Do you use a [facility]?  Name of the pharmacy and phone number for the current request:     Name of the medication requested: pharm: [facility], [place]     Suboxone    Other request: pharmacy is not able to fill this medication due to it being an early refill but pt is out of medication and needs. can you follow up on this?  medication: suboxone    pharm: [facility], [place]     Can we leave a detailed message on this number? YES    Phone number patient can be reached at: Home number on file [phone] (home)    Best Time: asap    Call taken on 7/22/2021 at 4:21 PM by Juju Maki

## 2021-07-22 NOTE — TELEPHONE ENCOUNTER
Called and spoke with pharmacist.    Sent prescription for 8-2mg tablets, #7, to be taken 1/2 tablet twice daily. Same dosage as previous discussion. Insurance approved.    Jesu Arciniega MD

## 2021-07-22 NOTE — TELEPHONE ENCOUNTER
Called [facility] and spoke to Karlene, pharmacist. Karlene states pt's insurance will not pay for med until 7/25/21. Pt has MA and cannot pay out of pocket for med. Karlene stated insurance may pay for med if the Rx strength is altered.     Routing to Dr. Arciniega for and directives.

## 2021-07-22 NOTE — TELEPHONE ENCOUNTER
Pt stated the pharmacy his medication was send to can't fill his med today because they are closing early. He will like his med to be sent to [facility] [address] [facility]. Pt fiance number [phone]

## 2021-07-22 NOTE — TELEPHONE ENCOUNTER
"Called pt. Asked pt how he has been taking his Suboxone and he stated, \"I have been taking it a little bit too much. I broke my ribs at work and I was in severe pain. I take 3 sometimes 4 a day.\" Stated he started taking three to four tablets on 7/10/21. Reported having four Suboxone tablets currently and that he has not taken any for the day.     Pt stated he had a work place injury. Stated he fell \"about 11 feet and came crushing down on my ribs.\" Discovered ribs were broken after going to [facility] urgent care yesterday. Stated he was told that he broke two ribs on his left side \"rib 7 and 8 I believe.\" Rates pain 10/10 when taking only two Suboxone tablets a day and 5-6/10 when taking three or four tablets daily. Stated he got nothing for pain at urgent care. Stated urgent care provider stated, \"since you have been working, I don't know how you've been doing it, but just stick with what your doing.\" Stated he informed provider that he only has four tablets left and provider stated he should reach out to  regarding refills. Stated he will bring the AVS from urgent care to  at his next apt.    Pt is requesting a Suboxone bridge to get him to his apt on 7/28/21. Requesting that he get 3-4 tablets a day.       Date of Last Office Visit: 7/7/21  Date of Next Office Visit: 7/28/21  No shows since last visit: 0  Cancellations since last visit: 0    Medication requested: Suboxone 2-0.5 mg Date last ordered: 7/7/21 Qty: 42 tablets Refills: 0     [place] of [place] ?: Yes  Medication last filled date: 7/7/21 Qty filled: 42 tablets  Other controlled substance on [place] ?: No    Lapse in medication adherence greater than 5 days?: No  If yes, call patient and gather details: See notes above  Medication refill request verified as identical to current order?: Yes  Result of Last DAM, VPA, Li+ Level, CBC, or Carbamazepine Level (at or since last visit): N/A    []Medication refilled per  Medication Refill in " Ambulatory Care  policy.  [x]Medication unable to be refilled by RN due to criteria not met as indicated below:    []Eligibility - not seen in the last year   []Supervision - no future appointment   []Compliance - no shows, cancellations or lapse in therapy   []Verification - order discrepancy   [x]Controlled medication   []Medication not included in policy   []90-day supply request   []Other

## 2021-07-22 NOTE — TELEPHONE ENCOUNTER
Pt stated the pharmacy his medication was send to can't fill his med today because they are closing early. He will like his med to be sent to [facility] [address] in [facility]. Pt fiance number [phone]

## 2021-07-22 NOTE — TELEPHONE ENCOUNTER
Patient called and is having trouble filling his Suboxone at the pharmacy.  The pharmacy suggested he contact the provider.  Patient uses [facility].  Patient can be reached at: [phone].

## 2021-07-22 NOTE — TELEPHONE ENCOUNTER
Called patient back.    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 He was seen at [facility]; [facility] records reviewed in [facility].    Approved increase in subutex for this week. Will continue for another week pending he is following our care plan of abstinence from other substances - alcohol and BZD positive at last visit. He has not used either substance since July 10, when the injury occurred.    Will taper down to TID dosing in 2 weeks, then back to BID dosing in 1-2 weeks following this. Normal healing time for broken ribs is 4-6 weeks.    I asked him to present to a [facility] clinic sometime on Monday or before to perform a urine screen to document abstinence. He will come to clinic next Wednesday as well. If he does not show up for urine screening these next 4 days, will change to weekly visits until dose increase has been completed.    Jesu Arciniega MD

## 2021-07-28 ENCOUNTER — OFFICE VISIT (OUTPATIENT)
Dept: ADDICTION MEDICINE | Facility: CLINIC | Age: 51
End: 2021-07-28
Payer: COMMERCIAL

## 2021-07-28 ENCOUNTER — TELEPHONE (OUTPATIENT)
Dept: ADDICTION MEDICINE | Facility: CLINIC | Age: 51
End: 2021-07-28

## 2021-07-28 VITALS
HEART RATE: 104 BPM | BODY MASS INDEX: 14.08 KG/M2 | DIASTOLIC BLOOD PRESSURE: 80 MMHG | TEMPERATURE: 96.8 F | OXYGEN SATURATION: 99 % | SYSTOLIC BLOOD PRESSURE: 146 MMHG | WEIGHT: 92.6 LBS

## 2021-07-28 DIAGNOSIS — F17.200 TOBACCO DEPENDENCE SYNDROME: ICD-10-CM

## 2021-07-28 DIAGNOSIS — G89.29 CHRONIC NECK PAIN: ICD-10-CM

## 2021-07-28 DIAGNOSIS — F11.90 OPIOID USE DISORDER: Primary | ICD-10-CM

## 2021-07-28 DIAGNOSIS — F10.10 ALCOHOL USE DISORDER, MILD, ABUSE: ICD-10-CM

## 2021-07-28 DIAGNOSIS — M54.2 CHRONIC NECK PAIN: ICD-10-CM

## 2021-07-28 DIAGNOSIS — S22.42XD CLOSED FRACTURE OF MULTIPLE RIBS OF LEFT SIDE WITH ROUTINE HEALING, SUBSEQUENT ENCOUNTER: ICD-10-CM

## 2021-07-28 DIAGNOSIS — F41.8 DEPRESSION WITH ANXIETY: ICD-10-CM

## 2021-07-28 LAB
AMPHETAMINES UR QL: NOT DETECTED
BARBITURATES UR QL SCN: NOT DETECTED
BENZODIAZ UR QL SCN: DETECTED
BUPRENORPHINE UR QL: DETECTED
CANNABINOIDS UR QL: NOT DETECTED
COCAINE UR QL SCN: NOT DETECTED
D-METHAMPHET UR QL: NOT DETECTED
METHADONE UR QL SCN: NOT DETECTED
OPIATES UR QL SCN: NOT DETECTED
OXYCODONE UR QL SCN: NOT DETECTED
PCP UR QL SCN: NOT DETECTED
PROPOXYPH UR QL: NOT DETECTED
TRICYCLICS UR QL SCN: NOT DETECTED

## 2021-07-28 PROCEDURE — 80307 DRUG TEST PRSMV CHEM ANLYZR: CPT | Mod: 90 | Performed by: FAMILY MEDICINE

## 2021-07-28 PROCEDURE — 80321 ALCOHOLS BIOMARKERS 1OR 2: CPT | Mod: 90 | Performed by: FAMILY MEDICINE

## 2021-07-28 PROCEDURE — 99000 SPECIMEN HANDLING OFFICE-LAB: CPT | Performed by: FAMILY MEDICINE

## 2021-07-28 PROCEDURE — 99214 OFFICE O/P EST MOD 30 MIN: CPT | Performed by: FAMILY MEDICINE

## 2021-07-28 RX ORDER — BUPRENORPHINE HYDROCHLORIDE AND NALOXONE HYDROCHLORIDE DIHYDRATE 2; .5 MG/1; MG/1
1 TABLET SUBLINGUAL 4 TIMES DAILY
Qty: 28 TABLET | Refills: 0 | Status: SHIPPED | OUTPATIENT
Start: 2021-07-28 | End: 2021-07-28 | Stop reason: DRUGHIGH

## 2021-07-28 RX ORDER — BUPRENORPHINE HYDROCHLORIDE AND NALOXONE HYDROCHLORIDE DIHYDRATE 2; .5 MG/1; MG/1
1 TABLET SUBLINGUAL 4 TIMES DAILY
Qty: 28 TABLET | Refills: 0 | Status: SHIPPED | OUTPATIENT
Start: 2021-07-28 | End: 2021-07-28

## 2021-07-28 RX ORDER — NAPROXEN 500 MG/1
500 TABLET ORAL 2 TIMES DAILY WITH MEALS
Qty: 60 TABLET | Refills: 1 | Status: SHIPPED | OUTPATIENT
Start: 2021-07-28 | End: 2021-11-19

## 2021-07-28 RX ORDER — BUPRENORPHINE HYDROCHLORIDE AND NALOXONE HYDROCHLORIDE DIHYDRATE 8; 2 MG/1; MG/1
TABLET SUBLINGUAL
Qty: 7 TABLET | Refills: 0 | Status: SHIPPED | OUTPATIENT
Start: 2021-07-28 | End: 2021-08-04

## 2021-07-28 NOTE — TELEPHONE ENCOUNTER
Phone call to pharmacy. Patient has [employer] insurance, which only covers 8-2mg Suboxone. Routing to Dr. Arciniega for directive.     Rowena Marques RN on 7/28/2021 at 2:09 PM

## 2021-07-28 NOTE — TELEPHONE ENCOUNTER
Reason for call:  Other     Patient called regarding (reason for call): prescription    Additional comments:   Pt called stating his pharmacy has received the rx, but the pt won't be able to  until they receive a prior auth. Pt called his health insurance & they stated that they will need the provider to call [facility]. Please further assist.     Phone number to reach patient:  [phone]    Best Time:  ASAP    Can we leave a detailed message on this number?  YES

## 2021-07-28 NOTE — LETTER
July 28, 2021      Justus Cope  [address] [address] [address]        To Whom it May Concern:    Please excuse Steven from work through Monday, August 2. He suffered broken ribs and is attempting to heal.       Sincerely,      Jesu Arciniega MD

## 2021-07-28 NOTE — TELEPHONE ENCOUNTER
Called and LVM.     Advised in VM that I sent 8-2mg tablet #7 Rx instead of 2-0.5mg tablets. This should not require prior authorization.    Jesu Arciniega MD

## 2021-07-28 NOTE — TELEPHONE ENCOUNTER
Reason for Call:  Other prescription    Detailed comments: pt called the Pharm:   [facility] - [place], [place] - [address] the prescription for clients: buprenorphine-naloxone (SUBOXONE)   But states they need prior authorization before they can fill it.   Per Pharm that auth needs to come from clinic.     Please contact client re: above     Phone Number Patient can be reached at: Home number on file [phone] (home)    Best Time: any    Can we leave a detailed message on this number? YES    Call taken on 7/28/2021 at 1:45 PM by Marek Braswell

## 2021-07-28 NOTE — TELEPHONE ENCOUNTER
Will update chart in today's visit - new Rx sent for 8-2mg. Do not wish to delay therapy at this time. He tolerated this well for the past week.    Jesu Arciniega MD
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
## 2021-07-28 NOTE — TELEPHONE ENCOUNTER
Phone call to pharmacy. New rx for Suboxone 8-2mg went through insurance without difficulty. Justus informed and will  today.    Rowena Marques RN on 7/28/2021 at 2:40 PM

## 2021-07-28 NOTE — TELEPHONE ENCOUNTER
Reason for call:  Other     Patient called regarding (reason for call):buprenorphine-naloxone (SUBOXONE) 2-0.5 MG SUBL sublingual tablet.     Additional comments:   Pt had a 9 am appt with  today (7/28). Stated he called his pharmacy and they have not received the rx. Please further assist.    Phone number to reach patient:  Other phone number:  [phone]    Best Time:  Any     Can we leave a detailed message on this number?  YES

## 2021-07-29 ENCOUNTER — TELEPHONE (OUTPATIENT)
Dept: BEHAVIORAL HEALTH | Facility: CLINIC | Age: 51
End: 2021-07-29

## 2021-07-29 NOTE — TELEPHONE ENCOUNTER
Prior Authorization Retail Medication Request    Medication/Dose: buprenorphine-naloxone (SUBOXONE) 8-2 MG SUBL sublingual tablet  ICD code (if different than what is on RX):  Opioid use disorder (H) [F11.99], Chronic neck pain [M54.2, G89.29], Closed fracture of multiple ribs of left side with routine healing, subsequent encounter [S22.42XD]  Previously Tried and Failed:    Rationale:      Insurance Name:  [employer]  Insurance ID: [id number]    Pharmacy Information (if different than what is on RX)  Name: [facility] - [place], [place] - [address] [place] [address] [address]  Phone:[phone]

## 2021-07-29 NOTE — TELEPHONE ENCOUNTER
No additional pa needed- the pharmacy has already received a paid claim for the 8-2mg tablet. The request below is for the old strength of 2-0.5 MG.  Closing encounter.

## 2021-07-30 ENCOUNTER — TELEPHONE (OUTPATIENT)
Dept: ADDICTION MEDICINE | Facility: CLINIC | Age: 51
End: 2021-07-30

## 2021-07-30 NOTE — TELEPHONE ENCOUNTER
Reason for Call:  Other Letter/ form    Detailed comments: a letter for empolyer/ or to call the employer. Pt is wondering how were you planning to get this information to the patient or employer- due to broken ribs    If you can share this with the pt, the email is:  e: [email or web address]    Phone Number Patient can be reached at: Home number on file [phone] (home)    Best Time: asap    Can we leave a detailed message on this number? YES    Call taken on 7/30/2021 at 4:20 PM by Juju Maki

## 2021-08-04 ENCOUNTER — LAB (OUTPATIENT)
Dept: LAB | Facility: CLINIC | Age: 51
End: 2021-08-04
Payer: COMMERCIAL

## 2021-08-04 ENCOUNTER — OFFICE VISIT (OUTPATIENT)
Dept: ADDICTION MEDICINE | Facility: CLINIC | Age: 51
End: 2021-08-04
Payer: COMMERCIAL

## 2021-08-04 ENCOUNTER — TELEPHONE (OUTPATIENT)
Dept: BEHAVIORAL HEALTH | Facility: CLINIC | Age: 51
End: 2021-08-04

## 2021-08-04 VITALS
BODY MASS INDEX: 14.87 KG/M2 | TEMPERATURE: 97.4 F | OXYGEN SATURATION: 98 % | DIASTOLIC BLOOD PRESSURE: 70 MMHG | HEART RATE: 81 BPM | WEIGHT: 97.8 LBS | SYSTOLIC BLOOD PRESSURE: 130 MMHG

## 2021-08-04 DIAGNOSIS — S22.42XD CLOSED FRACTURE OF MULTIPLE RIBS OF LEFT SIDE WITH ROUTINE HEALING, SUBSEQUENT ENCOUNTER: ICD-10-CM

## 2021-08-04 DIAGNOSIS — F11.90 OPIOID USE DISORDER: ICD-10-CM

## 2021-08-04 DIAGNOSIS — F11.90 OPIOID USE DISORDER: Primary | ICD-10-CM

## 2021-08-04 DIAGNOSIS — M54.2 CHRONIC NECK PAIN: ICD-10-CM

## 2021-08-04 DIAGNOSIS — G89.29 CHRONIC NECK PAIN: ICD-10-CM

## 2021-08-04 DIAGNOSIS — F10.10 ALCOHOL USE DISORDER, MILD, ABUSE: ICD-10-CM

## 2021-08-04 LAB
AMPHETAMINES UR QL: NOT DETECTED
BARBITURATES UR QL SCN: NOT DETECTED
BENZODIAZ UR QL SCN: DETECTED
BUPRENORPHINE UR QL: DETECTED
CANNABINOIDS UR QL: NOT DETECTED
COCAINE UR QL SCN: DETECTED
D-METHAMPHET UR QL: NOT DETECTED
METHADONE UR QL SCN: NOT DETECTED
OPIATES UR QL SCN: NOT DETECTED
OXYCODONE UR QL SCN: NOT DETECTED
PCP UR QL SCN: NOT DETECTED
PROPOXYPH UR QL: NOT DETECTED
TRICYCLICS UR QL SCN: NOT DETECTED

## 2021-08-04 PROCEDURE — 80353 DRUG SCREENING COCAINE: CPT | Mod: 90 | Performed by: NURSE PRACTITIONER

## 2021-08-04 PROCEDURE — 99000 SPECIMEN HANDLING OFFICE-LAB: CPT | Performed by: NURSE PRACTITIONER

## 2021-08-04 PROCEDURE — 80307 DRUG TEST PRSMV CHEM ANLYZR: CPT | Mod: 90 | Performed by: NURSE PRACTITIONER

## 2021-08-04 PROCEDURE — 99214 OFFICE O/P EST MOD 30 MIN: CPT | Performed by: FAMILY MEDICINE

## 2021-08-04 RX ORDER — BUPRENORPHINE HYDROCHLORIDE AND NALOXONE HYDROCHLORIDE DIHYDRATE 8; 2 MG/1; MG/1
TABLET SUBLINGUAL
Qty: 7 TABLET | Refills: 0 | Status: SHIPPED | OUTPATIENT
Start: 2021-08-04 | End: 2021-08-11
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
## 2021-08-04 NOTE — TELEPHONE ENCOUNTER
Pt's wife called stating that when pt saw Dr Arciniega today he had agreed to call in a prescription for zofran with the rest of pt's medications, however, pharmacy has not received this Rx.  Please call [phone] if there are any questions.

## 2021-08-04 NOTE — LETTER
August 4, 2021      Justus Cope  [id number] [address] [address]          To Whom it May Concern,    Justus can return to work today, 8/4/21. No restrictions        Sincerely,        Jesu Arciniega MD

## 2021-08-05 RX ORDER — ONDANSETRON 4 MG/1
4-8 TABLET, ORALLY DISINTEGRATING ORAL EVERY 8 HOURS PRN
Qty: 20 TABLET | Refills: 1 | Status: SHIPPED | OUTPATIENT
Start: 2021-08-05 | End: 2021-11-19

## 2021-08-06 LAB — ETHYL GLUCURONIDE UR QL SCN: NEGATIVE NG/ML

## 2021-08-07 LAB
BZE UR-MCNC: 80 NG/MG CREAT
COCAETHYLENE/CREAT UR CFM: NOT DETECTED NG/MG CREAT
COCAINE UR QL CFM: NORMAL
COCAINE/CREAT UR CFM: NOT DETECTED NG/MG CREAT
ETG ETS UR QL CFM: POSITIVE
ETHYL GLUCURONIDE UR CFM-MCNC: ABNORMAL NG/ML
ETHYL GLUCURONIDE UR QL CFM: POSITIVE
ETHYL GLUCURONIDE UR QL SCN: NORMAL NG/ML
ETHYL SULFATE UR CFM-MCNC: ABNORMAL NG/ML
ETHYL SULFATE UR QL CFM: POSITIVE
LEVEL OF DETECTION: NORMAL
Lab: ABNORMAL

## 2021-08-11 ENCOUNTER — OFFICE VISIT (OUTPATIENT)
Dept: ADDICTION MEDICINE | Facility: CLINIC | Age: 51
End: 2021-08-11
Payer: COMMERCIAL

## 2021-08-11 ENCOUNTER — TELEPHONE (OUTPATIENT)
Dept: ADDICTION MEDICINE | Facility: CLINIC | Age: 51
End: 2021-08-11

## 2021-08-11 ENCOUNTER — LAB (OUTPATIENT)
Dept: LAB | Facility: CLINIC | Age: 51
End: 2021-08-11
Payer: COMMERCIAL

## 2021-08-11 VITALS
SYSTOLIC BLOOD PRESSURE: 146 MMHG | BODY MASS INDEX: 14.75 KG/M2 | HEART RATE: 80 BPM | DIASTOLIC BLOOD PRESSURE: 76 MMHG | OXYGEN SATURATION: 97 % | TEMPERATURE: 97.6 F | WEIGHT: 97 LBS

## 2021-08-11 DIAGNOSIS — S22.42XD CLOSED FRACTURE OF MULTIPLE RIBS OF LEFT SIDE WITH ROUTINE HEALING, SUBSEQUENT ENCOUNTER: ICD-10-CM

## 2021-08-11 DIAGNOSIS — G89.29 CHRONIC NECK PAIN: ICD-10-CM

## 2021-08-11 DIAGNOSIS — F41.8 DEPRESSION WITH ANXIETY: ICD-10-CM

## 2021-08-11 DIAGNOSIS — F10.10 ALCOHOL USE DISORDER, MILD, ABUSE: ICD-10-CM

## 2021-08-11 DIAGNOSIS — F11.90 OPIOID USE DISORDER: Primary | ICD-10-CM

## 2021-08-11 DIAGNOSIS — M54.2 CHRONIC NECK PAIN: ICD-10-CM

## 2021-08-11 DIAGNOSIS — F11.90 OPIOID USE DISORDER: ICD-10-CM

## 2021-08-11 LAB — CREAT UR-MCNC: 28 MG/DL

## 2021-08-11 PROCEDURE — 99000 SPECIMEN HANDLING OFFICE-LAB: CPT | Performed by: NURSE PRACTITIONER

## 2021-08-11 PROCEDURE — 99214 OFFICE O/P EST MOD 30 MIN: CPT | Performed by: FAMILY MEDICINE

## 2021-08-11 PROCEDURE — 80307 DRUG TEST PRSMV CHEM ANLYZR: CPT | Performed by: NURSE PRACTITIONER

## 2021-08-11 PROCEDURE — 82570 ASSAY OF URINE CREATININE: CPT | Mod: 59 | Performed by: NURSE PRACTITIONER

## 2021-08-11 PROCEDURE — 80321 ALCOHOLS BIOMARKERS 1OR 2: CPT | Mod: 90

## 2021-08-11 RX ORDER — BUPRENORPHINE HYDROCHLORIDE AND NALOXONE HYDROCHLORIDE DIHYDRATE 2; .5 MG/1; MG/1
1 TABLET SUBLINGUAL 3 TIMES DAILY
Qty: 45 TABLET | Refills: 0 | Status: SHIPPED | OUTPATIENT
Start: 2021-08-11 | End: 2021-08-11 | Stop reason: ALTCHOICE

## 2021-08-11 RX ORDER — BUPRENORPHINE HYDROCHLORIDE AND NALOXONE HYDROCHLORIDE DIHYDRATE 8; 2 MG/1; MG/1
TABLET SUBLINGUAL
Qty: 14 TABLET | Refills: 0 | Status: SHIPPED | OUTPATIENT
Start: 2021-08-11 | End: 2021-08-25
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
## 2021-08-13 LAB
BUPRENORPHINE UR CFM-MCNC: 154 NG/ML
BUPRENORPHINE/CREAT UR: 550 NG/MG {CREAT}
CREATININE URINE MG/DL  (SYNCED VALUE): 28 MG/DL
NORBUPRENORPHINE UR CFM-MCNC: 428 NG/ML
NORBUPRENORPHINE/CREAT UR: 1529 NG/MG {CREAT}

## 2021-08-19 LAB
ETG ETS UR QL CFM: POSITIVE
ETHYL GLUCURONIDE UR CFM-MCNC: ABNORMAL NG/ML
ETHYL GLUCURONIDE UR QL CFM: POSITIVE
ETHYL GLUCURONIDE UR QL SCN: NORMAL NG/ML
ETHYL SULFATE UR CFM-MCNC: 4885 NG/ML
ETHYL SULFATE UR QL CFM: POSITIVE
Lab: ABNORMAL

## 2021-08-25 ENCOUNTER — OFFICE VISIT (OUTPATIENT)
Dept: ADDICTION MEDICINE | Facility: CLINIC | Age: 51
End: 2021-08-25
Payer: COMMERCIAL

## 2021-08-25 ENCOUNTER — LAB (OUTPATIENT)
Dept: LAB | Facility: CLINIC | Age: 51
End: 2021-08-25

## 2021-08-25 VITALS
WEIGHT: 94.25 LBS | BODY MASS INDEX: 14.33 KG/M2 | SYSTOLIC BLOOD PRESSURE: 116 MMHG | HEART RATE: 96 BPM | DIASTOLIC BLOOD PRESSURE: 80 MMHG | TEMPERATURE: 98.8 F | OXYGEN SATURATION: 98 %

## 2021-08-25 DIAGNOSIS — M54.2 CHRONIC NECK PAIN: ICD-10-CM

## 2021-08-25 DIAGNOSIS — F11.90 OPIOID USE DISORDER: ICD-10-CM

## 2021-08-25 DIAGNOSIS — G89.29 CHRONIC NECK PAIN: ICD-10-CM

## 2021-08-25 DIAGNOSIS — F11.90 OPIOID USE DISORDER: Primary | ICD-10-CM

## 2021-08-25 LAB
AMPHETAMINES UR QL: NOT DETECTED
BARBITURATES UR QL SCN: NOT DETECTED
BENZODIAZ UR QL SCN: DETECTED
BUPRENORPHINE UR QL: DETECTED
CANNABINOIDS UR QL: NOT DETECTED
COCAINE UR QL SCN: NOT DETECTED
CREAT UR-MCNC: 129 MG/DL
D-METHAMPHET UR QL: NOT DETECTED
METHADONE UR QL SCN: NOT DETECTED
OPIATES UR QL SCN: NOT DETECTED
OXYCODONE UR QL SCN: NOT DETECTED
PCP UR QL SCN: NOT DETECTED
PROPOXYPH UR QL: NOT DETECTED
TRICYCLICS UR QL SCN: NOT DETECTED

## 2021-08-25 PROCEDURE — 82570 ASSAY OF URINE CREATININE: CPT

## 2021-08-25 PROCEDURE — 99214 OFFICE O/P EST MOD 30 MIN: CPT | Performed by: FAMILY MEDICINE

## 2021-08-25 PROCEDURE — 80307 DRUG TEST PRSMV CHEM ANLYZR: CPT | Mod: 90

## 2021-08-25 PROCEDURE — 99000 SPECIMEN HANDLING OFFICE-LAB: CPT

## 2021-08-25 RX ORDER — BUPRENORPHINE HYDROCHLORIDE AND NALOXONE HYDROCHLORIDE DIHYDRATE 2; .5 MG/1; MG/1
1 TABLET SUBLINGUAL 2 TIMES DAILY
Qty: 30 TABLET | Refills: 1 | Status: SHIPPED | OUTPATIENT
Start: 2021-08-25 | End: 2021-09-22

## 2021-08-25 NOTE — PATIENT INSTRUCTIONS
Thank you for coming in today!      Here is a brief summary of the plan we discussed:  1.  Call [facility] to arrange Rule 25 evaluation. This is the assessment that you need to start outpatient treatment.    - [phone]    - [phone] (from the back of your card)    2.  After your assessment, they will recommend a treatment course for outpatient treatment.    - Please ensure you fill out a Release of Information (ROBBIE) so I can talk with the treatment group and/or the assessment staff    I look forward to seeing you back in clinic!

## 2021-08-27 LAB
BUPRENORPHINE UR CFM-MCNC: 99 NG/ML
BUPRENORPHINE/CREAT UR: 77 NG/MG {CREAT}
CREATININE URINE MG/DL  (SYNCED VALUE): 129 MG/DL
NALOXONE UR CFM-MCNC: 14 NG/ML
NALOXONE: 11 NG/MG {CREAT}
NORBUPRENORPHINE UR CFM-MCNC: 507 NG/ML
NORBUPRENORPHINE/CREAT UR: 393 NG/MG {CREAT}
NORDIAZEPAM UR QL CFM: PRESENT
OXAZEPAM UR QL CFM: PRESENT
TEMAZEPAM UR QL CFM: PRESENT

## 2021-08-31 LAB
ETHANOL UR QL CFM: NORMAL
ETHYL GLUCURONIDE UR QL SCN: NORMAL NG/MG CREAT
ETHYL SULFATE/CREAT UR: NORMAL NG/MG CREAT
LEVEL OF DETECTION (ETHANOL): NORMAL
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
## 2021-09-10 ENCOUNTER — TELEPHONE (OUTPATIENT)
Dept: ADDICTION MEDICINE | Facility: CLINIC | Age: 51
End: 2021-09-10

## 2021-09-10 NOTE — TELEPHONE ENCOUNTER
Attempted to call Steven this week, but phone is not in service. Called his partner to touch base and was able to reach him.    He declines any further support to obtain the Rule 25 we discussed at last visit.    Jesu Arciniega MD

## 2021-09-22 ENCOUNTER — OFFICE VISIT (OUTPATIENT)
Dept: ADDICTION MEDICINE | Facility: CLINIC | Age: 51
End: 2021-09-22
Payer: COMMERCIAL

## 2021-09-22 VITALS
TEMPERATURE: 98 F | HEART RATE: 90 BPM | DIASTOLIC BLOOD PRESSURE: 90 MMHG | SYSTOLIC BLOOD PRESSURE: 130 MMHG | OXYGEN SATURATION: 97 % | WEIGHT: 95 LBS | BODY MASS INDEX: 14.44 KG/M2

## 2021-09-22 DIAGNOSIS — F11.90 OPIOID USE DISORDER: Primary | ICD-10-CM

## 2021-09-22 DIAGNOSIS — G89.29 CHRONIC NECK PAIN: ICD-10-CM

## 2021-09-22 DIAGNOSIS — F10.10 ALCOHOL USE DISORDER, MILD, ABUSE: ICD-10-CM

## 2021-09-22 DIAGNOSIS — M54.2 CHRONIC NECK PAIN: ICD-10-CM

## 2021-09-22 PROCEDURE — 80306 DRUG TEST PRSMV INSTRMNT: CPT | Performed by: FAMILY MEDICINE

## 2021-09-22 PROCEDURE — 99214 OFFICE O/P EST MOD 30 MIN: CPT | Performed by: FAMILY MEDICINE

## 2021-09-22 RX ORDER — BUPRENORPHINE HYDROCHLORIDE AND NALOXONE HYDROCHLORIDE DIHYDRATE 2; .5 MG/1; MG/1
1 TABLET SUBLINGUAL 2 TIMES DAILY
Qty: 30 TABLET | Refills: 1 | Status: SHIPPED | OUTPATIENT
Start: 2021-09-22 | End: 2021-10-21

## 2021-09-22 NOTE — PROGRESS NOTES
[facility]    A/P                                                    ASSESSMENT/PLAN  Diagnoses and all orders for this visit:  Opioid use disorder (H)  -     Urine Drugs of Abuse Screen Panel 13; Future  -     buprenorphine-naloxone (SUBOXONE) 2-0.5 MG SUBL sublingual tablet; Place 1 tablet under the tongue 2 times daily  Chronic neck pain  Alcohol use disorder, mild, abuse    Orders Placed This Encounter   Medications     buprenorphine-naloxone (SUBOXONE) 2-0.5 MG SUBL sublingual tablet     Sig: Place 1 tablet under the tongue 2 times daily     Dispense:  30 tablet     Refill:  1     NADEAN: [id number]; Please substitute for covered alternative per insurance request.       Problem list updated Sep 22, 2021   No problems updated.      - Continue 2mg BID subxone  - Continues to drink alcohol despite repeat agreements to stop drinking. Intermittent BZD use as well  - Indicated he will need to be engaged in an abstinence program of organized treatment in order to continue with subxone mgmt  - Will need to taper/stop suboxone if he has ongoing alcohol use without engagement in abstinence      PDMP Review       Value Time User    State PDMP site checked  Yes 9/22/2021  9:46 AM Jesu Arciniega MD            RTC  Return in 29 days (on 10/21/2021) for phone visit, 11am.      Counseled the patient on the importance of having a recovery program in addition to medication to manage recovery.  Components include avoiding isolating, having willingness to change, avoiding triggers and managing cravings. Encouraged having some type of sober network and practicing honesty with trusted support person(s). Encouraged other services such as counseling, 12 step or other self-help organizations.      Opioid warning reviewed.  Risk of overdose following a period of abstinence due to decrease tolerance was discussed including risk of death.  Strongly recommended abstain from alcohol, benzodiazepines, THC,  opioids and other drugs of abuse.  Increased risk of return to opioid use after use of these substances discussed.  Increased risk of overdose/death with use of other substances particularly benzodiazepines/alcohol reviewed.        SUBJECTIVE                                                    Justus Cope is a 51 year old male who presents to clinic today for follow up    Visit performed In Person, face-to-face      TODAY'S VISIT  HPI Sep 22, 2021  - Continues to report positive mood, no concerns with life, work, or his relationships  - Continues to report inconsistent alcohol use  - When discussing need for treatment, he indicates understanding and agreement  - Denies any cravings or opioid use      OBJECTIVE                                                    PHYSICAL EXAM:  BP (!) 130/90   Pulse 90   Temp 98  F (36.7  C) (Temporal)   Wt 43.1 kg (95 lb)   SpO2 97%   BMI 14.44 kg/m      GENERAL: healthy, alert and no distress  EYES: Eyes grossly normal to inspection, PERRL and conjunctivae and sclerae normal  RESP: No respiratory distress  MENTAL STATUS EXAM  Appearance/Behavior: No appearant distress  Speech: Normal  Mood/Affect: normal affect  Insight: Fair    LAB  Results for orders placed or performed in visit on 09/22/21   Urine Drugs of Abuse Screen Panel 13     Status: Abnormal   Result Value Ref Range    Cannabinoids (70-opj-6-carboxy-9-THC) Not Detected Not Detected, Indeterminate    Phencyclidine Not Detected Not Detected, Indeterminate    Cocaine (Benzoylecgonine) Not Detected Not Detected, Indeterminate    Methamphetamine (d-Methamphetamine) Not Detected Not Detected, Indeterminate    Opiates (Morphine) Not Detected Not Detected, Indeterminate    Amphetamine (d-Amphetamine) Not Detected Not Detected, Indeterminate    Benzodiazepines (Nordiazepam) Detected (A) Not Detected, Indeterminate    Tricyclic Antidepressants (Desipramine) Not Detected Not Detected, Indeterminate    Methadone Not Detected  Not Detected, Indeterminate    Barbiturates (Butalbital) Not Detected Not Detected, Indeterminate    Oxycodone Not Detected Not Detected, Indeterminate    Propoxyphene (Norpropoxyphene) Not Detected Not Detected, Indeterminate    Buprenorphine Detected (A) Not Detected, Indeterminate         HISTORY                                                    Problem list reviewed & adjusted, as indicated.  Patient Active Problem List   Diagnosis     Opioid use disorder     Chronic neck pain     Alcohol use disorder, mild, abuse     Depression with anxiety     Tobacco dependence syndrome         MEDICATION LIST (prior to visit)  naproxen (NAPROSYN) 500 MG tablet, Take 1 tablet (500 mg) by mouth 2 times daily (with meals)  nicotine (NICOTROL) 10 MG inhaler, Use 1 cartridge as needed for urge to smoke by puffing over course of 20min.  Use 6-16 cart/day; reduce number of cart/day over 6-12 weeks.  ondansetron (ZOFRAN-ODT) 4 MG ODT tab, Take 1-2 tablets (4-8 mg) by mouth every 8 hours as needed for nausea  PARoxetine (PAXIL) 20 MG tablet, Take 1 tablet (20 mg) by mouth every morning    No current facility-administered medications on file prior to visit.      MEDICATION LIST (after visit)  Current Outpatient Medications   Medication     buprenorphine-naloxone (SUBOXONE) 2-0.5 MG SUBL sublingual tablet     naproxen (NAPROSYN) 500 MG tablet     nicotine (NICOTROL) 10 MG inhaler     ondansetron (ZOFRAN-ODT) 4 MG ODT tab     PARoxetine (PAXIL) 20 MG tablet     No current facility-administered medications for this visit.         No Known Allergies    Additional MDM Details:  none    Jesu Arciniega MD  [facility]  [phone]

## 2021-10-11 ENCOUNTER — HEALTH MAINTENANCE LETTER (OUTPATIENT)
Age: 51
End: 2021-10-11

## 2021-10-21 ENCOUNTER — VIRTUAL VISIT (OUTPATIENT)
Dept: ADDICTION MEDICINE | Facility: CLINIC | Age: 51
End: 2021-10-21
Payer: COMMERCIAL

## 2021-10-21 DIAGNOSIS — F11.90 OPIOID USE DISORDER: ICD-10-CM

## 2021-10-21 DIAGNOSIS — M54.2 CHRONIC NECK PAIN: ICD-10-CM

## 2021-10-21 DIAGNOSIS — F10.10 ALCOHOL USE DISORDER, MILD, ABUSE: ICD-10-CM

## 2021-10-21 DIAGNOSIS — F11.20 UNCOMPLICATED OPIOID DEPENDENCE (H): Primary | ICD-10-CM

## 2021-10-21 DIAGNOSIS — G89.29 CHRONIC NECK PAIN: ICD-10-CM

## 2021-10-21 PROCEDURE — 96127 BRIEF EMOTIONAL/BEHAV ASSMT: CPT | Mod: 95 | Performed by: FAMILY MEDICINE

## 2021-10-21 PROCEDURE — 99442 PR PHYSICIAN TELEPHONE EVALUATION 11-20 MIN: CPT | Performed by: FAMILY MEDICINE

## 2021-10-21 RX ORDER — BUPRENORPHINE HYDROCHLORIDE AND NALOXONE HYDROCHLORIDE DIHYDRATE 2; .5 MG/1; MG/1
1 TABLET SUBLINGUAL 2 TIMES DAILY
Qty: 30 TABLET | Refills: 1 | Status: SHIPPED | OUTPATIENT
Start: 2021-10-21 | End: 2021-11-19

## 2021-10-21 ASSESSMENT — ANXIETY QUESTIONNAIRES
4. TROUBLE RELAXING: NOT AT ALL
GAD7 TOTAL SCORE: 3
GAD7 TOTAL SCORE: 3
1. FEELING NERVOUS, ANXIOUS, OR ON EDGE: MORE THAN HALF THE DAYS
GAD7 TOTAL SCORE: 3
3. WORRYING TOO MUCH ABOUT DIFFERENT THINGS: NOT AT ALL
7. FEELING AFRAID AS IF SOMETHING AWFUL MIGHT HAPPEN: NOT AT ALL
6. BECOMING EASILY ANNOYED OR IRRITABLE: NOT AT ALL
2. NOT BEING ABLE TO STOP OR CONTROL WORRYING: SEVERAL DAYS
7. FEELING AFRAID AS IF SOMETHING AWFUL MIGHT HAPPEN: NOT AT ALL
8. IF YOU CHECKED OFF ANY PROBLEMS, HOW DIFFICULT HAVE THESE MADE IT FOR YOU TO DO YOUR WORK, TAKE CARE OF THINGS AT HOME, OR GET ALONG WITH OTHER PEOPLE?: NOT DIFFICULT AT ALL
5. BEING SO RESTLESS THAT IT IS HARD TO SIT STILL: NOT AT ALL

## 2021-10-21 ASSESSMENT — PATIENT HEALTH QUESTIONNAIRE - PHQ9
10. IF YOU CHECKED OFF ANY PROBLEMS, HOW DIFFICULT HAVE THESE PROBLEMS MADE IT FOR YOU TO DO YOUR WORK, TAKE CARE OF THINGS AT HOME, OR GET ALONG WITH OTHER PEOPLE: NOT DIFFICULT AT ALL
SUM OF ALL RESPONSES TO PHQ QUESTIONS 1-9: 0
SUM OF ALL RESPONSES TO PHQ QUESTIONS 1-9: 0
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
## 2021-10-21 NOTE — PROGRESS NOTES
Justus is a 51 year old who is being evaluated via a billable telephone visit.      What phone number would you like to be contacted at?   [phone]  How would you like to obtain your AVS? Red Condorhart

## 2021-10-22 ASSESSMENT — PATIENT HEALTH QUESTIONNAIRE - PHQ9: SUM OF ALL RESPONSES TO PHQ QUESTIONS 1-9: 0

## 2021-10-22 ASSESSMENT — ANXIETY QUESTIONNAIRES: GAD7 TOTAL SCORE: 3

## 2021-11-19 ENCOUNTER — LAB (OUTPATIENT)
Dept: LAB | Facility: CLINIC | Age: 51
End: 2021-11-19

## 2021-11-19 ENCOUNTER — OFFICE VISIT (OUTPATIENT)
Dept: ADDICTION MEDICINE | Facility: CLINIC | Age: 51
End: 2021-11-19
Payer: COMMERCIAL

## 2021-11-19 VITALS
BODY MASS INDEX: 15.2 KG/M2 | TEMPERATURE: 97.3 F | OXYGEN SATURATION: 98 % | HEART RATE: 78 BPM | WEIGHT: 100 LBS | SYSTOLIC BLOOD PRESSURE: 100 MMHG | DIASTOLIC BLOOD PRESSURE: 70 MMHG

## 2021-11-19 DIAGNOSIS — F41.8 DEPRESSION WITH ANXIETY: ICD-10-CM

## 2021-11-19 DIAGNOSIS — G89.29 CHRONIC NECK PAIN: ICD-10-CM

## 2021-11-19 DIAGNOSIS — F17.200 TOBACCO DEPENDENCE SYNDROME: ICD-10-CM

## 2021-11-19 DIAGNOSIS — F10.10 ALCOHOL USE DISORDER, MILD, ABUSE: ICD-10-CM

## 2021-11-19 DIAGNOSIS — M54.2 CHRONIC NECK PAIN: ICD-10-CM

## 2021-11-19 DIAGNOSIS — F11.20 UNCOMPLICATED OPIOID DEPENDENCE (H): ICD-10-CM

## 2021-11-19 DIAGNOSIS — F11.20 UNCOMPLICATED OPIOID DEPENDENCE (H): Primary | ICD-10-CM

## 2021-11-19 PROCEDURE — 80306 DRUG TEST PRSMV INSTRMNT: CPT

## 2021-11-19 PROCEDURE — 99214 OFFICE O/P EST MOD 30 MIN: CPT | Performed by: FAMILY MEDICINE

## 2021-11-19 RX ORDER — NICOTINE 21 MG/24HR
1 PATCH, TRANSDERMAL 24 HOURS TRANSDERMAL EVERY 24 HOURS
Qty: 28 PATCH | Refills: 11 | Status: SHIPPED | OUTPATIENT
Start: 2021-11-19 | End: 2022-04-29

## 2021-11-19 RX ORDER — BUPRENORPHINE HYDROCHLORIDE AND NALOXONE HYDROCHLORIDE DIHYDRATE 2; .5 MG/1; MG/1
1 TABLET SUBLINGUAL 2 TIMES DAILY
Qty: 30 TABLET | Refills: 1 | Status: SHIPPED | OUTPATIENT
Start: 2021-11-19 | End: 2021-12-17
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
## 2021-12-17 ENCOUNTER — OFFICE VISIT (OUTPATIENT)
Dept: ADDICTION MEDICINE | Facility: CLINIC | Age: 51
End: 2021-12-17
Payer: COMMERCIAL

## 2021-12-17 VITALS
BODY MASS INDEX: 14.73 KG/M2 | DIASTOLIC BLOOD PRESSURE: 85 MMHG | HEIGHT: 68 IN | OXYGEN SATURATION: 99 % | TEMPERATURE: 97 F | HEART RATE: 101 BPM | WEIGHT: 97.2 LBS | SYSTOLIC BLOOD PRESSURE: 159 MMHG

## 2021-12-17 DIAGNOSIS — F10.10 ALCOHOL USE DISORDER, MILD, ABUSE: ICD-10-CM

## 2021-12-17 DIAGNOSIS — F11.20 UNCOMPLICATED OPIOID DEPENDENCE (H): Primary | ICD-10-CM

## 2021-12-17 PROCEDURE — 80307 DRUG TEST PRSMV CHEM ANLYZR: CPT | Mod: 90 | Performed by: FAMILY MEDICINE

## 2021-12-17 PROCEDURE — 99000 SPECIMEN HANDLING OFFICE-LAB: CPT | Performed by: FAMILY MEDICINE

## 2021-12-17 PROCEDURE — 99214 OFFICE O/P EST MOD 30 MIN: CPT | Performed by: FAMILY MEDICINE

## 2021-12-17 PROCEDURE — 80346 BENZODIAZEPINES1-12: CPT | Mod: 90 | Performed by: FAMILY MEDICINE

## 2021-12-17 RX ORDER — BUPRENORPHINE HYDROCHLORIDE AND NALOXONE HYDROCHLORIDE DIHYDRATE 2; .5 MG/1; MG/1
1 TABLET SUBLINGUAL 2 TIMES DAILY
Qty: 24 TABLET | Refills: 0 | Status: SHIPPED | OUTPATIENT
Start: 2021-12-17 | End: 2021-12-29

## 2021-12-17 ASSESSMENT — MIFFLIN-ST. JEOR: SCORE: 1270.4

## 2021-12-17 NOTE — PROGRESS NOTES
[facility] Addiction Medicine    A/P                                                    ASSESSMENT/PLAN  Diagnoses and all orders for this visit:  Uncomplicated opioid dependence (H)  -     buprenorphine-naloxone (SUBOXONE) 2-0.5 MG SUBL sublingual tablet; Place 1 tablet under the tongue 2 times daily  -     Urine Drugs of Abuse Screen Panel 13; Future  -     Benzodiazepine Confirmatory Quant Urine; Future  Alcohol use disorder, mild, abuse  -     Ethyl Glucuronide Urine; Future  -     Benzodiazepine Confirmatory Quant Urine; Future    Orders Placed This Encounter   Medications     buprenorphine-naloxone (SUBOXONE) 2-0.5 MG SUBL sublingual tablet     Sig: Place 1 tablet under the tongue 2 times daily     Dispense:  24 tablet     Refill:  0     NADEAN: [id number]; Please substitute for covered alternative per insurance request.       Problem list updated Dec 17, 2021   No problems updated.      Dec 17, 2021  - Reports no alcohol, benzo use; awaiting confirmatory testing  - Denies cravings or other substnace use  - No changes with suboxone, no concerns  - Was more irritable and struggled more than he expected with alcohol cessation; he is eager to start at [facility] but has not initiated care  - 2 wk Rx for suboxone; will only allow further refills if he starts intake and allows for ROBBIE/discussion, as per previous notes          PDMP Review       Value Time User    State PDMP site checked  Yes 12/17/2021  3:58 PM Jesu Arciniega MD            RTC  No follow-ups on file.   Mid-late January      Counseled the patient on the importance of having a recovery program in addition to medication to manage recovery.  Components include avoiding isolating, having willingness to change, avoiding triggers and managing cravings. Encouraged having some type of sober network and practicing honesty with trusted support person(s). Encouraged other services such as counseling, 12 step or other self-help organizations.   "    Opioid warning reviewed.  Risk of overdose following a period of abstinence due to decrease tolerance was discussed including risk of death.  Strongly recommended abstain from alcohol, benzodiazepines, THC, opioids and other drugs of abuse.  Increased risk of return to opioid use after use of these substances discussed.  Increased risk of overdose/death with use of other substances particularly benzodiazepines/alcohol reviewed.        SUBJECTIVE                                                    Justus Cope is a 51 year old male who presents to clinic today for follow up    Visit performed In Person, face-to-face      TODAY'S VISIT  HPI Dec 17, 2021  - Rushed today. Having a hectic day at work  - Denies substance use  - Wants to start at [facility] ASAP  - Mood irritated, stressed but denies any functional concerns      OBJECTIVE                                                    PHYSICAL EXAM:  BP (!) 159/85   Pulse 101   Temp 97  F (36.1  C)   Ht 1.727 m (5' 8\")   Wt 44.1 kg (97 lb 3.2 oz)   SpO2 99%   BMI 14.78 kg/m      GENERAL: healthy, alert and no distress  EYES: Eyes grossly normal to inspection, PERRL and conjunctivae and sclerae normal  RESP: No respiratory distress  MENTAL STATUS EXAM  Appearance/Behavior: Restless  Speech: Normal  Mood/Affect: anxiety  Insight: Fair    LAB  Results for orders placed or performed in visit on 12/17/21   Urine Drugs of Abuse Screen Panel 13     Status: Abnormal   Result Value Ref Range    Cannabinoids (97-qor-1-carboxy-9-THC) Not Detected Not Detected, Indeterminate    Phencyclidine Not Detected Not Detected, Indeterminate    Cocaine (Benzoylecgonine) Not Detected Not Detected, Indeterminate    Methamphetamine (d-Methamphetamine) Not Detected Not Detected, Indeterminate    Opiates (Morphine) Not Detected Not Detected, Indeterminate    Amphetamine (d-Amphetamine) Not Detected Not Detected, Indeterminate    Benzodiazepines (Nordiazepam) Detected (A) Not " Detected, Indeterminate    Tricyclic Antidepressants (Desipramine) Not Detected Not Detected, Indeterminate    Methadone Not Detected Not Detected, Indeterminate    Barbiturates (Butalbital) Not Detected Not Detected, Indeterminate    Oxycodone Not Detected Not Detected, Indeterminate    Propoxyphene (Norpropoxyphene) Not Detected Not Detected, Indeterminate    Buprenorphine Detected (A) Not Detected, Indeterminate         HISTORY                                                    Problem list reviewed & adjusted, as indicated.  Patient Active Problem List   Diagnosis     Chronic neck pain     Alcohol use disorder, mild, abuse     Depression with anxiety     Tobacco dependence syndrome     Uncomplicated opioid dependence (H)         MEDICATION LIST (prior to visit)  nicotine (NICODERM CQ) 14 MG/24HR 24 hr patch, Place 1 patch onto the skin every 24 hours  nicotine polacrilex (NICORETTE) 4 MG gum, Place 1 each (4 mg) inside cheek every hour as needed for smoking cessation    No current facility-administered medications on file prior to visit.      MEDICATION LIST (after visit)  Current Outpatient Medications   Medication     buprenorphine-naloxone (SUBOXONE) 2-0.5 MG SUBL sublingual tablet     nicotine (NICODERM CQ) 14 MG/24HR 24 hr patch     nicotine polacrilex (NICORETTE) 4 MG gum     No current facility-administered medications for this visit.         No Known Allergies    Additional MDM Details:  none    Jesu Arciniega MD  [facility] Addiction Medicine  [phone]

## 2021-12-21 ENCOUNTER — TELEPHONE (OUTPATIENT)
Dept: ADDICTION MEDICINE | Facility: CLINIC | Age: 51
End: 2021-12-21
Payer: COMMERCIAL

## 2021-12-21 NOTE — TELEPHONE ENCOUNTER
Prior Authorization Approval    Authorization Effective Date: 12/21/2021  Authorization Expiration Date: 12/20/2022  Medication: suboxone-APPROVED  Approved Dose/Quantity:   Reference #:     Insurance Company: [employer] - Phone [phone] Fax [phone]  Expected CoPay:       CoPay Card Available:      Foundation Assistance Needed:    Which Pharmacy is filling the prescription (Not needed for infusion/clinic administered): [facility] - [address]  Pharmacy Notified: Yes  Patient Notified: No    Received call from Amy at [employer] with verbal approval.  Pharmacy will notify patient when medication is ready.

## 2021-12-21 NOTE — TELEPHONE ENCOUNTER
Phone call to [facility] re: Suboxone rx from Dr. Arciniega. Per pharmacy, insurance limits patient to 1 tab daily. Routing to prior authorization team. Routing to Dr. Arciniega as FYI.    Rowena Marques RN on 12/21/2021 at 10:41 AM

## 2021-12-21 NOTE — TELEPHONE ENCOUNTER
Reason for Call:  Other prescription    Detailed comments:     Pt called stating he was not able to  suboxone rx 21 after his visit with . Pharmacy told him that his Prior Auth has . Pt has not taken any medication since 21. Please further assist.    Phone Number Patient can be reached at: [phone]  Best Time: Any    Can we leave a detailed message on this number? YES    Call taken on 2021 at 10:24 AM by Salud Britton

## 2021-12-21 NOTE — TELEPHONE ENCOUNTER
Central Prior Authorization Team   Phone: [phone]      PA Initiation    Medication: suboxone-Initiated  Insurance Company: [employer] - Phone [phone] Fax [phone]  Pharmacy Filling the Rx: [facility] - [address]  Filling Pharmacy Phone: [phone]  Filling Pharmacy Fax:    Start Date: 12/21/2021

## 2021-12-28 LAB
A-OH ALPRAZ/CREAT UR: NEGATIVE NG/ML
ALPRAZ UR CFM-MCNC: NEGATIVE NG/ML
LORAZEPAM UR CFM-MCNC: NEGATIVE NG/ML
NORDIAZEPAM/CREAT UR: NEGATIVE NG/ML
OXAZEPAM UR CFM-MCNC: NEGATIVE NG/ML
TEMAZEPAM/CREAT UR: NEGATIVE NG/ML

## 2021-12-29 ENCOUNTER — TELEPHONE (OUTPATIENT)
Dept: ADDICTION MEDICINE | Facility: CLINIC | Age: 51
End: 2021-12-29
Payer: COMMERCIAL

## 2021-12-29 DIAGNOSIS — F11.20 UNCOMPLICATED OPIOID DEPENDENCE (H): ICD-10-CM

## 2021-12-29 RX ORDER — BUPRENORPHINE HYDROCHLORIDE AND NALOXONE HYDROCHLORIDE DIHYDRATE 2; .5 MG/1; MG/1
1 TABLET SUBLINGUAL 2 TIMES DAILY
Qty: 20 TABLET | Refills: 0 | Status: SHIPPED | OUTPATIENT
Start: 2021-12-29 | End: 2022-01-14

## 2021-12-29 NOTE — CONFIDENTIAL NOTE
Talked with [facility]. Has an appt with them on Jan 4 for intake/assessment - [phone].    Advised that he needs to provide ROBBIE to them to speak with me about his care.    I will call them on Benedicto 10 or 11 to verify and ensure that there is consistent information being shared.    Suboxone refill sent for 10 days; he will be out on Benedicto 3 or 4, so once I reach him on the 10th or 11th for follow-up, we can provide a refill and setup a f/up appt    Jesu Arciniega MD

## 2021-12-29 NOTE — TELEPHONE ENCOUNTER
"Reason for Call:  Other call back and returning call    Detailed comments:   Lissy's, Patient's Significant other called to leave pt's work number for . Stated patient has been calling [phone] but has been unable to reach Provider. Asked that provider \"call his work place and ask for Steven. Someone will get him from the back cooler. 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        Phone Number Patient can be reached at: [phone]    Best Time: Any    Can we leave a detailed message on this number? NO    Call taken on 12/29/2021 at 3:21 PM by Salud Britton      "

## 2022-01-14 ENCOUNTER — TELEPHONE (OUTPATIENT)
Dept: ADDICTION MEDICINE | Facility: CLINIC | Age: 52
End: 2022-01-14
Payer: COMMERCIAL

## 2022-01-14 RX ORDER — BUPRENORPHINE HYDROCHLORIDE AND NALOXONE HYDROCHLORIDE DIHYDRATE 2; .5 MG/1; MG/1
1 TABLET SUBLINGUAL 2 TIMES DAILY
Qty: 10 TABLET | Refills: 0 | Status: SHIPPED | OUTPATIENT
Start: 2022-01-14 | End: 2022-01-18

## 2022-01-14 NOTE — CONFIDENTIAL NOTE
Spoke with intake counselor at [facility]. Justus will be offered a course of outpatient treatment. He will work with me and our clinic to complete urine screening for alcohol use.    Will likely recommend PEth testing, as this is better at detecting ANY alcohol use for 1-3 weeks vs Ethyl Glucoronide, which has a shorter window of detection.    Rx bridge sent to last until next week.    Jesu Arciniega MD

## 2022-01-14 NOTE — TELEPHONE ENCOUNTER
Salud Britton routed conversation to Addiction Medicine Rn - [facility] 9 minutes ago (3:31 PM)     Salud Britton 9 minutes ago (3:31 PM)     MY       Reason for Call:  Medication Request due to: out of medication early     Name of the pharmacy and phone number for the current request:  [facility] - [place], [place] - [address]     Request for bridge of: buprenorphine-naloxone (SUBOXONE) 2-0.5 MG SUBL sublingual tablet     Other Information:   Taking as prescribed: Yes  Date/Time/ amount of last dose: 1/14 AM.     Lissy ( significant other ) called on pt behalf as requested by him. Pt is schedule for a telephone visit with  1/18/22 @ 4pm. States pt has 1 one tab left for tonight then will be out. Requesting a bridge until he see . Please further assist.     Consent to communicate form on file for Lissy     Pt informed to follow up with pharmacy for status of refill as addiction RN will only reach out if there are any issues or questions and will be addressed within one business day.  Pt also informed that this request for a bridge is simply a request and doesn't guarantee the medication will be filled.     Can we leave a detailed message on this number? Yes     Phone number patient can be reached at: [phone] (Lissy's number) Pt does not have personal number at the moment. Lissy will call pt's workplace to give provider/RN a call if needed.      Best Time: Any               Dr. Arciniega, patient's appointment is not till 1/18/2021 with you. Please reply with directives in regards to patient call and the plan you noted in previous encounters.

## 2022-01-14 NOTE — TELEPHONE ENCOUNTER
Reason for Call:  Medication Request due to: out of medication early    Name of the pharmacy and phone number for the current request:  [facility] - [place], [place] - [address]    Request for bridge of: buprenorphine-naloxone (SUBOXONE) 2-0.5 MG SUBL sublingual tablet    Other Information:   Taking as prescribed: Yes  Date/Time/ amount of last dose: 1/14 AM.    Lissy ( significant other ) called on pt behalf as requested by him. Pt is schedule for a telephone visit with  1/18/22 @ 4pm. States pt has 1 one tab left for tonight then will be out. Requesting a bridge until he see . Please further assist.    Consent to communicate form on file for Lissy    Pt informed to follow up with pharmacy for status of refill as addiction RN will only reach out if there are any issues or questions and will be addressed within one business day.  Pt also informed that this request for a bridge is simply a request and doesn't guarantee the medication will be filled.    Can we leave a detailed message on this number? Yes    Phone number patient can be reached at: [phone] (Lissy's number) Pt does not have personal number at the moment. Lissy will call pt's workplace to give provider/RN a call if needed.     Best Time: Any

## 2022-01-17 ASSESSMENT — PATIENT HEALTH QUESTIONNAIRE - PHQ9
SUM OF ALL RESPONSES TO PHQ QUESTIONS 1-9: 1
10. IF YOU CHECKED OFF ANY PROBLEMS, HOW DIFFICULT HAVE THESE PROBLEMS MADE IT FOR YOU TO DO YOUR WORK, TAKE CARE OF THINGS AT HOME, OR GET ALONG WITH OTHER PEOPLE: NOT DIFFICULT AT ALL
SUM OF ALL RESPONSES TO PHQ QUESTIONS 1-9: 1

## 2022-01-18 ENCOUNTER — VIRTUAL VISIT (OUTPATIENT)
Dept: ADDICTION MEDICINE | Facility: CLINIC | Age: 52
End: 2022-01-18
Payer: COMMERCIAL

## 2022-01-18 DIAGNOSIS — F17.200 TOBACCO DEPENDENCE SYNDROME: ICD-10-CM

## 2022-01-18 DIAGNOSIS — F11.20 UNCOMPLICATED OPIOID DEPENDENCE (H): Primary | ICD-10-CM

## 2022-01-18 PROCEDURE — 99214 OFFICE O/P EST MOD 30 MIN: CPT | Mod: 95 | Performed by: FAMILY MEDICINE

## 2022-01-18 RX ORDER — BUPRENORPHINE HYDROCHLORIDE AND NALOXONE HYDROCHLORIDE DIHYDRATE 2; .5 MG/1; MG/1
1 TABLET SUBLINGUAL 2 TIMES DAILY
Qty: 30 TABLET | Refills: 0 | Status: SHIPPED | OUTPATIENT
Start: 2022-01-18 | End: 2022-02-02

## 2022-01-18 ASSESSMENT — PATIENT HEALTH QUESTIONNAIRE - PHQ9: SUM OF ALL RESPONSES TO PHQ QUESTIONS 1-9: 1

## 2022-01-18 NOTE — PROGRESS NOTES
Steven is a 51 year old who is being evaluated via a billable telephone visit.      What phone number would you like to be contacted at? [phone]  How would you like to obtain your AVS? MyChart    Answers for HPI/ROS submitted by the patient on 1/17/2022  If you checked off any problems, how difficult have these problems made it for you to do your work, take care of things at home, or get along with other people?: Not difficult at all  PHQ9 TOTAL SCORE: 1

## 2022-01-18 NOTE — PROGRESS NOTES
[facility] Addiction Medicine    A/P                                                    ASSESSMENT/PLAN  Diagnoses and all orders for this visit:  Uncomplicated opioid dependence (H)  -     buprenorphine-naloxone (SUBOXONE) 2-0.5 MG SUBL sublingual tablet; Place 1 tablet under the tongue 2 times daily  Tobacco dependence syndrome    Orders Placed This Encounter   Medications     buprenorphine-naloxone (SUBOXONE) 2-0.5 MG SUBL sublingual tablet     Sig: Place 1 tablet under the tongue 2 times daily     Dispense:  30 tablet     Refill:  0     NADEAN: [id number]; Please substitute for covered alternative per insurance request.       Problem list updated Jan 18, 2022   No problems updated.      Jan 18, 2022  - Starting outpatient tx at [facility] within the next 2 weeks  - Advised he needs to stop all alcohol use. He agrees to this  - Will run PEth testing at next visit. If levels are >100, this indicates significant use within the past 2 weeks and will require a dose decrease of suboxone. Steven indicated understanding and agreement  - Suboxone otherwise continues to help with cravings, pain. No concerns or SE  - Using NRT sparingly      Last encounter A/P  Dec 17, 2021  - Reports no alcohol, benzo use; awaiting confirmatory testing  - Denies cravings or other substnace use  - No changes with suboxone, no concerns  - Was more irritable and struggled more than he expected with alcohol cessation; he is eager to start at [facility] but has not initiated care  - 2 wk Rx for suboxone; will only allow further refills if he starts intake and allows for ROBBIE/discussion, as per previous notes      PDMP Review       Value Time User    State PDMP site checked  Yes 1/18/2022  4:27 PM Jesu Arciniega MD            RTC  Return in 15 days (on 2/2/2022) for 9am, in person (ok to book at 9:30 with notes about 9am arrival).      Counseled the patient on the importance of having a recovery program in addition to medication  to manage recovery.  Components include avoiding isolating, having willingness to change, avoiding triggers and managing cravings. Encouraged having some type of sober network and practicing honesty with trusted support person(s). Encouraged other services such as counseling, 12 step or other self-help organizations.      Opioid warning reviewed.  Risk of overdose following a period of abstinence due to decrease tolerance was discussed including risk of death.  Strongly recommended abstain from alcohol, benzodiazepines, THC, opioids and other drugs of abuse.  Increased risk of return to opioid use after use of these substances discussed.  Increased risk of overdose/death with use of other substances particularly benzodiazepines/alcohol reviewed.        SUBJECTIVE                                                    Justus Cope is a 51 year old male who presents to clinic today for follow up    Visit performed Virtual, via telephone    Time spent on phone call: 15 minutes      PHQ-9 Score:   PHQ 1/6/2021 10/21/2021 1/17/2022   PHQ-9 Total Score 0 0 1   Q9: Thoughts of better off dead/self-harm past 2 weeks Not at all Not at all Not at all       ROOPA-7 Score:  ROOPA-7 SCORE 10/21/2021   Total Score 3 (minimal anxiety)   Total Score 3         Recent HPI Details:  HPI Aug 25, 2021  - Steven reports no substance use  - Reviewed last appt, where he indicated no alcohol use but his results showed objective evidence of considerable alcohol use  - Discussed need for treatment  - Suboxone consistent, no changes, no SE  - Mood stable  HPI Sep 22, 2021  - Continues to report positive mood, no concerns with life, work, or his relationships  - Continues to report inconsistent alcohol use  - When discussing need for treatment, he indicates understanding and agreement  - Denies any cravings or opioid use  HPI Oct 21, 2021  - Is signed up with [facility] - Beulah Barboza, ph# [phone] - Steven provides consent for me to  talk with her about his medical care  - Decided to change location/program as it is closer to home, and he had missed his intake  - Working more often, 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 Having a hectic day at work  - Denies substance use  - Wants to start at [facility] ASAP  - Mood irritated, stressed but denies any functional concerns      TODAY'S VISIT  HPI Jan 18, 2022  - He signed up with [facility] for outpatient treatment and plans to start in 1-2 weeks; 2 days weekly  - Agrees to stop alcohol use today  - Mood stable, positive today  - Denies any additional work stressors or life changes  - Denies other substance use or concerns      OBJECTIVE                                                    PHYSICAL EXAM:  There were no vitals taken for this visit.    GENERAL: healthy, alert and no distress  RESP: No respiratory distress  MENTAL STATUS EXAM  Appearance/Behavior: No appearant distress  Speech: Normal  Mood/Affect: normal affect  Insight: Adequate    LAB  No results found for any visits on 01/18/22.      HISTORY                                                    Problem list reviewed & adjusted, as indicated.  Patient Active Problem List   Diagnosis     Chronic neck pain     Alcohol use disorder, mild, abuse     Depression with anxiety     Tobacco dependence syndrome     Uncomplicated opioid dependence (H)         MEDICATION LIST (prior to visit)  nicotine (NICODERM CQ) 14 MG/24HR 24 hr patch, Place 1 patch onto the skin every 24 hours  nicotine polacrilex (NICORETTE) 4 MG gum, Place 1 each (4 mg) inside cheek every hour as needed for smoking cessation    No current facility-administered  medications on file prior to visit.      MEDICATION LIST (after visit)  Current Outpatient Medications   Medication     buprenorphine-naloxone (SUBOXONE) 2-0.5 MG SUBL sublingual tablet     nicotine (NICODERM CQ) 14 MG/24HR 24 hr patch     nicotine polacrilex (NICORETTE) 4 MG gum     No current facility-administered medications for this visit.         No Known Allergies    Additional MDM Details:  none    Jesu Arciniega MD  [facility] Addiction Medicine  [phone]

## 2022-02-02 ENCOUNTER — OFFICE VISIT (OUTPATIENT)
Dept: ADDICTION MEDICINE | Facility: CLINIC | Age: 52
End: 2022-02-02
Payer: COMMERCIAL

## 2022-02-02 ENCOUNTER — LAB (OUTPATIENT)
Dept: LAB | Facility: CLINIC | Age: 52
End: 2022-02-02

## 2022-02-02 VITALS
HEART RATE: 96 BPM | BODY MASS INDEX: 15.2 KG/M2 | DIASTOLIC BLOOD PRESSURE: 100 MMHG | SYSTOLIC BLOOD PRESSURE: 180 MMHG | WEIGHT: 100 LBS

## 2022-02-02 DIAGNOSIS — F10.10 ALCOHOL USE DISORDER, MILD, ABUSE: ICD-10-CM

## 2022-02-02 DIAGNOSIS — F11.20 UNCOMPLICATED OPIOID DEPENDENCE (H): ICD-10-CM

## 2022-02-02 DIAGNOSIS — F10.930 ALCOHOL WITHDRAWAL SYNDROME WITHOUT COMPLICATION (H): ICD-10-CM

## 2022-02-02 DIAGNOSIS — F11.20 UNCOMPLICATED OPIOID DEPENDENCE (H): Primary | ICD-10-CM

## 2022-02-02 LAB
ALBUMIN SERPL-MCNC: 3.5 G/DL (ref 3.4–5)
ALP SERPL-CCNC: 149 U/L (ref 40–150)
ALT SERPL W P-5'-P-CCNC: 36 U/L (ref 0–70)
ANION GAP SERPL CALCULATED.3IONS-SCNC: 7 MMOL/L (ref 3–14)
AST SERPL W P-5'-P-CCNC: 52 U/L (ref 0–45)
BILIRUB SERPL-MCNC: 0.6 MG/DL (ref 0.2–1.3)
BUN SERPL-MCNC: 8 MG/DL (ref 7–30)
CALCIUM SERPL-MCNC: 9.5 MG/DL (ref 8.5–10.1)
CHLORIDE BLD-SCNC: 101 MMOL/L (ref 94–109)
CO2 SERPL-SCNC: 26 MMOL/L (ref 20–32)
CREAT SERPL-MCNC: 0.55 MG/DL (ref 0.66–1.25)
CREAT UR-MCNC: 24 MG/DL
GFR SERPL CREATININE-BSD FRML MDRD: >90 ML/MIN/1.73M2
GGT SERPL-CCNC: 90 U/L (ref 0–75)
GLUCOSE BLD-MCNC: 110 MG/DL (ref 70–99)
POTASSIUM BLD-SCNC: 3.6 MMOL/L (ref 3.4–5.3)
PROT SERPL-MCNC: 8.7 G/DL (ref 6.8–8.8)
SODIUM SERPL-SCNC: 134 MMOL/L (ref 133–144)

## 2022-02-02 PROCEDURE — 99207 PR CDG-MDM COMPONENT: MEETS MODERATE - DOWN CODED: CPT | Performed by: FAMILY MEDICINE

## 2022-02-02 PROCEDURE — 82977 ASSAY OF GGT: CPT

## 2022-02-02 PROCEDURE — 36415 COLL VENOUS BLD VENIPUNCTURE: CPT

## 2022-02-02 PROCEDURE — 99214 OFFICE O/P EST MOD 30 MIN: CPT | Performed by: FAMILY MEDICINE

## 2022-02-02 PROCEDURE — 80307 DRUG TEST PRSMV CHEM ANLYZR: CPT

## 2022-02-02 PROCEDURE — 80053 COMPREHEN METABOLIC PANEL: CPT

## 2022-02-02 PROCEDURE — 80321 ALCOHOLS BIOMARKERS 1OR 2: CPT | Mod: 90

## 2022-02-02 PROCEDURE — 99000 SPECIMEN HANDLING OFFICE-LAB: CPT

## 2022-02-02 RX ORDER — GABAPENTIN 300 MG/1
300-600 CAPSULE ORAL 4 TIMES DAILY
Qty: 60 CAPSULE | Refills: 1 | Status: SHIPPED | OUTPATIENT
Start: 2022-02-02 | End: 2022-03-17

## 2022-02-02 RX ORDER — BUPRENORPHINE HYDROCHLORIDE AND NALOXONE HYDROCHLORIDE DIHYDRATE 2; .5 MG/1; MG/1
1 TABLET SUBLINGUAL 2 TIMES DAILY
Qty: 14 TABLET | Refills: 0 | Status: SHIPPED | OUTPATIENT
Start: 2022-02-02 | End: 2022-02-08

## 2022-02-02 NOTE — PROGRESS NOTES
[facility] Addiction Medicine    A/P                                                    ASSESSMENT/PLAN  Diagnoses and all orders for this visit:  Uncomplicated opioid dependence (H)  -     Drug Confirmation Panel Urine with Creat - lab collect; Future  -     buprenorphine-naloxone (SUBOXONE) 2-0.5 MG SUBL sublingual tablet; Place 1 tablet under the tongue 2 times daily  Alcohol use disorder, mild, abuse  -     Phosphatidylethanol (PEth); Future  -     gabapentin (NEURONTIN) 300 MG capsule; Take 1-2 capsules (300-600 mg) by mouth 4 times daily  -     Comprehensive metabolic panel (BMP + Alb, Alk Phos, ALT, AST, Total. Bili, TP); Future  -     GGT; Future  Alcohol withdrawal syndrome without complication (H)    Orders Placed This Encounter   Medications     buprenorphine-naloxone (SUBOXONE) 2-0.5 MG SUBL sublingual tablet     Sig: Place 1 tablet under the tongue 2 times daily     Dispense:  14 tablet     Refill:  0     NADEAN: [id number]; Please substitute for covered alternative per insurance request.     gabapentin (NEURONTIN) 300 MG capsule     Sig: Take 1-2 capsules (300-600 mg) by mouth 4 times daily     Dispense:  60 capsule     Refill:  1       Problem list updated Feb 2, 2022   No problems updated.      Feb 2, 2022  - Appears to be in withdrawal from alcohol  - He is minimizing use and severity of withdrawal, despite his difficulty holding a pen and writing, obvious sweating, and elevated BP. He does appear scared, however further prompting elicits no concern from him regarding his safety or well-being  - No history of seizures or severe withdrawal. He notes this is a new experience for him. Denies any recent BZD use. AST and GGT mildly elevated. Electrolytes wnl. Risk for complicated withdrawal and seizure is moderate, though home detoxification is appropriate given the above factors  - The combination of alcohol and suboxone remains concerning. Advised that he must stop alcohol use and attend  treatment given his calm, unconcerned responses are incongruent to his appearance with multiple signs of gila withdrawal, and his repeated misrepresentation of substance use  - Justus continues to come to visits and ask for support, so I do believe he will pursue further treatment and support, and abstinence is a shared goal at this time  - Will monitor PEth testing and ensure levels drop and eventually remain negative as he engages with treatment  - He has not setup intake for his outpatient program; I will call him tomorrow to ensure this has occurred. 1 week suboxone prescribed today, as he continues to make efforts toward cessation. Arriving today in alcohol withdrawal is a clear sign of his attempts to seek abstinence  - If he is not able to setup intake with [facility], I will decrease his suboxone to 2mg daily, down from 4mg, and continue the taper process  - Starting gabapentin today for withdrawal mgmt, as above. I advised that, if his symptoms worsen, it would be best for him to report to our [place] ED for withdrawal mgmt  - He will need close monitoring for outpaitent withdrawal management; I will plan to call him tomorrow and my staff will reach out next week for further follow-up. We will see him again in clinic in 2 weeks for f/up      Last encounter A/P  Jan 18, 2022  - Starting outpatient tx at [facility] within the next 2 weeks  - Advised he needs to stop all alcohol use. He agrees to this  - Will run PEth testing at next visit. If levels are >100, this indicates significant use within the past 2 weeks and will require a dose decrease of suboxone. Steven indicated understanding and agreement  - Suboxone otherwise continues to help with cravings, pain. No concerns or SE  - Using NRT sparingly      PDMP Review       Value Time User    State PDMP site checked  Yes 2/2/2022  9:47 AM Jesu Arciniega MD            RTC  No follow-ups on file.      Counseled the patient on the importance of having  a recovery program in addition to medication to manage recovery.  Components include avoiding isolating, having willingness to change, avoiding triggers and managing cravings. Encouraged having some type of sober network and practicing honesty with trusted support person(s). Encouraged other services such as counseling, 12 step or other self-help organizations.      Opioid warning reviewed.  Risk of overdose following a period of abstinence due to decrease tolerance was discussed including risk of death.  Strongly recommended abstain from alcohol, benzodiazepines, THC, opioids and other drugs of abuse.  Increased risk of return to opioid use after use of these substances discussed.  Increased risk of overdose/death with use of other substances particularly benzodiazepines/alcohol reviewed.        SUBJECTIVE                                                    Justus Cope is a 51 year old male who presents to clinic today for follow up    Visit performed In Person, face-to-face        Rooming information:  Any substance use since last appt?: Yes  Side effects related to medication for substance use (constipation, dry mouth, sedation?) No   Narcan currently available: No  Other recent substance use:     Alcohol    NICOTINE-Yes:   If using nicotine, ready to quit? No    PHQ-9 Score:   PHQ 1/6/2021 10/21/2021 1/17/2022   PHQ-9 Total Score 0 0 1   Q9: Thoughts of better off dead/self-harm past 2 weeks Not at all Not at all Not at all       ROOPA-7 Score:  ROOPA-7 SCORE 10/21/2021   Total Score 3 (minimal anxiety)   Total Score 3         Primary care provider: Physician No Ref-Primary     Contact information up to date? yes    3rd Party Involvement not at this time (please obtain ROBBIE if pt would like to include)    Jennifer Manzo MA  February 2, 2022  9:11 AM        Recent HPI Details:  HPI Aug 25, 2021  - Steven reports no substance use  - Reviewed last appt, where he indicated no alcohol use but his results showed  objective evidence of considerable alcohol use  - Discussed need for treatment  - Suboxone consistent, no changes, no SE  - Mood stable  HPI Sep 22, 2021  - Continues to report positive mood, no concerns with life, work, or his relationships  - Continues to report inconsistent alcohol use  - When discussing need for treatment, he indicates understanding and agreement  - Denies any cravings or opioid use  HPI Oct 21, 2021  - Is signed up with [facility] - Beulah Barboza, ph# [phone] - Steven provides consent for me to talk with her about his medical care  - Decided to change location/program as it is closer to home, and he had missed his intake  - Working more often, 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 Having a hectic day at work  - Denies substance use  - Wants to start at [facility] ASAP  - Mood irritated, stressed but denies any functional concerns  HPI Jan 18, 2022  - He signed up with [facility] for outpatient treatment and plans to start in 1-2 weeks; 2 days weekly  - Agrees to stop alcohol use today  - Mood stable, positive today  - Denies any additional work stressors or life changes  - Denies other substance use or concerns      TODAY'S VISIT  HPI Feb 2, 2022  - Reports quitting alcohol within the past 2 days  - Shaking with tremors, sweating, and notices his high BP. Denies any CP, SOB, hallucinations or perceptual disturbances, HA, n/v. Notes minimal anxiety above baseline  - Recognizes this is a new withdrawal experience for him; he has not felt symtoms of this nature in the past  - Continues to use suboxone without changes  - Has not yet setup an appt with [facility] to start  treatment      OBJECTIVE                                                    PHYSICAL EXAM:  BP (!) 180/100   Pulse 96   Wt 45.4 kg (100 lb)   BMI 15.20 kg/m      Alert, pleasant, tremulous at rest with worsening tremor when attempting to perform fine motor skills (writing). Sweating visibly, though not pouring or drenching clothing. Anxiety minimal, mood positive. Insight fair. PERRLA      LAB  Results for orders placed or performed in visit on 02/02/22   Urine Creatinine for Drug Screen Panel     Status: None   Result Value Ref Range    Creatinine Urine for Drug Screen 24 mg/dL   Comprehensive metabolic panel (BMP + Alb, Alk Phos, ALT, AST, Total. Bili, TP)     Status: Abnormal   Result Value Ref Range    Sodium 134 133 - 144 mmol/L    Potassium 3.6 3.4 - 5.3 mmol/L    Chloride 101 94 - 109 mmol/L    Carbon Dioxide (CO2) 26 20 - 32 mmol/L    Anion Gap 7 3 - 14 mmol/L    Urea Nitrogen 8 7 - 30 mg/dL    Creatinine 0.55 (L) 0.66 - 1.25 mg/dL    Calcium 9.5 8.5 - 10.1 mg/dL    Glucose 110 (H) 70 - 99 mg/dL    Alkaline Phosphatase 149 40 - 150 U/L    AST 52 (H) 0 - 45 U/L    ALT 36 0 - 70 U/L    Protein Total 8.7 6.8 - 8.8 g/dL    Albumin 3.5 3.4 - 5.0 g/dL    Bilirubin Total 0.6 0.2 - 1.3 mg/dL    GFR Estimate >90 >60 mL/min/1.73m2   GGT     Status: Abnormal   Result Value Ref Range    GGT 90 (H) 0 - 75 U/L   Drug Confirmation Panel Urine with Creat - lab collect     Status: None (In process)    Narrative    The following orders were created for panel order Drug Confirmation Panel Urine with Creat - lab collect.  Procedure                               Abnormality         Status                     ---------                               -----------         ------                     Urine Drug Confirmation ...[[id number]]                      In process                 Urine Creatinine for Ehsan...[[id number]]                      Final result                 Please view results for these tests on the individual  orders.         HISTORY                                                    Problem list reviewed & adjusted, as indicated.  Patient Active Problem List   Diagnosis     Chronic neck pain     Alcohol use disorder, mild, abuse     Depression with anxiety     Tobacco dependence syndrome     Uncomplicated opioid dependence (H)         MEDICATION LIST (prior to visit)  nicotine (NICODERM CQ) 14 MG/24HR 24 hr patch, Place 1 patch onto the skin every 24 hours (Patient not taking: Reported on 2/2/2022)  nicotine polacrilex (NICORETTE) 4 MG gum, Place 1 each (4 mg) inside cheek every hour as needed for smoking cessation (Patient not taking: Reported on 2/2/2022)    No current facility-administered medications on file prior to visit.      MEDICATION LIST (after visit)  Current Outpatient Medications   Medication     buprenorphine-naloxone (SUBOXONE) 2-0.5 MG SUBL sublingual tablet     gabapentin (NEURONTIN) 300 MG capsule     nicotine (NICODERM CQ) 14 MG/24HR 24 hr patch     nicotine polacrilex (NICORETTE) 4 MG gum     No current facility-administered medications for this visit.         No Known Allergies        Jesu Arciniega MD  [facility] Addiction Medicine  [phone]

## 2022-02-03 ENCOUNTER — TELEPHONE (OUTPATIENT)
Dept: ADDICTION MEDICINE | Facility: CLINIC | Age: 52
End: 2022-02-03
Payer: COMMERCIAL

## 2022-02-03 DIAGNOSIS — F10.930 ALCOHOL WITHDRAWAL SYNDROME WITHOUT COMPLICATION (H): Primary | ICD-10-CM

## 2022-02-03 DIAGNOSIS — F11.20 UNCOMPLICATED OPIOID DEPENDENCE (H): ICD-10-CM

## 2022-02-03 RX ORDER — CLONIDINE HYDROCHLORIDE 0.1 MG/1
.1-.2 TABLET ORAL 3 TIMES DAILY PRN
Qty: 10 TABLET | Refills: 0 | Status: SHIPPED | OUTPATIENT
Start: 2022-02-03 | End: 2022-03-17

## 2022-02-03 NOTE — TELEPHONE ENCOUNTER
Writer attempted to return patients call, no answer, left message to return call via VM.     Alicia Vidal RN on 2/3/2022 at 2:19 PM

## 2022-02-03 NOTE — TELEPHONE ENCOUNTER
Called and discussed with patient. Confirmed intake next Tuesday. Will call [facility] on Tuesday to confirm details after his appt.    Withdrawals somewhat better, but he is still sweating profusely. Added clonidine for the sweating and to lower BP.    Will f/up next week.    Jesu Arciniega MD

## 2022-02-03 NOTE — TELEPHONE ENCOUNTER
Reason for call:  Other   Patient called regarding (reason for call): call back  Additional comments: pt wants to follow up that he was able to make an appt with : kyle castillo - intake schedule for tuesday at 11am (virtual)    Phone number to reach patient:  Cell number on file:    Telephone Information:   Mobile [phone]       Best Time:  asap    Can we leave a detailed message on this number?  YES    Travel screening: Not Applicable

## 2022-02-04 LAB
BUPRENORPHINE UR CFM-MCNC: 181 NG/ML
BUPRENORPHINE/CREAT UR: 754 NG/MG {CREAT}
NORBUPRENORPHINE UR CFM-MCNC: 89 NG/ML
NORBUPRENORPHINE/CREAT UR: 371 NG/MG {CREAT}

## 2022-02-08 LAB — PETH BLD-MCNC: 1937 NG/ML

## 2022-02-08 RX ORDER — BUPRENORPHINE HYDROCHLORIDE AND NALOXONE HYDROCHLORIDE DIHYDRATE 2; .5 MG/1; MG/1
1 TABLET SUBLINGUAL 2 TIMES DAILY
Qty: 14 TABLET | Refills: 0 | Status: SHIPPED | OUTPATIENT
Start: 2022-02-08 | End: 2022-02-16

## 2022-02-08 NOTE — CONFIDENTIAL NOTE
Called and spoke with Steven. He had trouble connecting with [facility]. And rescheduled for this Thursday.    Refilled suboxone. He reports no longer having withdrawal symptoms - gabapentin and clonidine helping him feel better. Denies any alcohol use since our last visit.    Jesu Arciniega MD

## 2022-02-08 NOTE — TELEPHONE ENCOUNTER
Reason for call:  Other   Patient called regarding (reason for call): call back  Additional comments: pt calling to advise appointment with [facility] has changed to a later date and wanted to follow up with you in regards to that.  (1pm on Thursday)    Phone number to reach patient:  Home number on file [phone] (home)    Best Time:  asap    Can we leave a detailed message on this number?  YES    Travel screening: Not Applicable

## 2022-02-16 ENCOUNTER — LAB (OUTPATIENT)
Dept: LAB | Facility: CLINIC | Age: 52
End: 2022-02-16

## 2022-02-16 ENCOUNTER — OFFICE VISIT (OUTPATIENT)
Dept: ADDICTION MEDICINE | Facility: CLINIC | Age: 52
End: 2022-02-16
Payer: COMMERCIAL

## 2022-02-16 VITALS
WEIGHT: 105 LBS | BODY MASS INDEX: 15.97 KG/M2 | HEART RATE: 101 BPM | TEMPERATURE: 98.7 F | DIASTOLIC BLOOD PRESSURE: 93 MMHG | SYSTOLIC BLOOD PRESSURE: 166 MMHG | OXYGEN SATURATION: 97 %

## 2022-02-16 DIAGNOSIS — F10.10 ALCOHOL USE DISORDER, MILD, ABUSE: ICD-10-CM

## 2022-02-16 DIAGNOSIS — F11.20 UNCOMPLICATED OPIOID DEPENDENCE (H): ICD-10-CM

## 2022-02-16 DIAGNOSIS — F11.20 UNCOMPLICATED OPIOID DEPENDENCE (H): Primary | ICD-10-CM

## 2022-02-16 PROCEDURE — 99000 SPECIMEN HANDLING OFFICE-LAB: CPT

## 2022-02-16 PROCEDURE — 36415 COLL VENOUS BLD VENIPUNCTURE: CPT

## 2022-02-16 PROCEDURE — 99214 OFFICE O/P EST MOD 30 MIN: CPT | Performed by: FAMILY MEDICINE

## 2022-02-16 PROCEDURE — 80306 DRUG TEST PRSMV INSTRMNT: CPT

## 2022-02-16 PROCEDURE — 80321 ALCOHOLS BIOMARKERS 1OR 2: CPT | Mod: 90

## 2022-02-16 RX ORDER — BUPRENORPHINE HYDROCHLORIDE AND NALOXONE HYDROCHLORIDE DIHYDRATE 2; .5 MG/1; MG/1
1 TABLET SUBLINGUAL 2 TIMES DAILY
Qty: 30 TABLET | Refills: 0 | Status: SHIPPED | OUTPATIENT
Start: 2022-02-16 | End: 2022-03-02

## 2022-02-16 ASSESSMENT — PATIENT HEALTH QUESTIONNAIRE - PHQ9
SUM OF ALL RESPONSES TO PHQ QUESTIONS 1-9: 0
10. IF YOU CHECKED OFF ANY PROBLEMS, HOW DIFFICULT HAVE THESE PROBLEMS MADE IT FOR YOU TO DO YOUR WORK, TAKE CARE OF THINGS AT HOME, OR GET ALONG WITH OTHER PEOPLE: NOT DIFFICULT AT ALL
SUM OF ALL RESPONSES TO PHQ QUESTIONS 1-9: 0

## 2022-02-16 ASSESSMENT — ANXIETY QUESTIONNAIRES
GAD7 TOTAL SCORE: 3
5. BEING SO RESTLESS THAT IT IS HARD TO SIT STILL: NOT AT ALL
7. FEELING AFRAID AS IF SOMETHING AWFUL MIGHT HAPPEN: NOT AT ALL
GAD7 TOTAL SCORE: 3
4. TROUBLE RELAXING: SEVERAL DAYS
6. BECOMING EASILY ANNOYED OR IRRITABLE: NOT AT ALL
2. NOT BEING ABLE TO STOP OR CONTROL WORRYING: NOT AT ALL
7. FEELING AFRAID AS IF SOMETHING AWFUL MIGHT HAPPEN: NOT AT ALL
1. FEELING NERVOUS, ANXIOUS, OR ON EDGE: SEVERAL DAYS
GAD7 TOTAL SCORE: 3
3. WORRYING TOO MUCH ABOUT DIFFERENT THINGS: SEVERAL DAYS

## 2022-02-16 NOTE — PROGRESS NOTES
[facility]    A/P                                                    ASSESSMENT/PLAN  Diagnoses and all orders for this visit:  Uncomplicated opioid dependence (H)  -     Urine Drugs of Abuse Screen Panel 13; Future  -     buprenorphine-naloxone (SUBOXONE) 2-0.5 MG SUBL sublingual tablet; Place 1 tablet under the tongue 2 times daily  Alcohol use disorder, mild, abuse  -     Phosphatidylethanol (PEth); Future    Orders Placed This Encounter   Medications     buprenorphine-naloxone (SUBOXONE) 2-0.5 MG SUBL sublingual tablet     Sig: Place 1 tablet under the tongue 2 times daily     Dispense:  30 tablet     Refill:  0     NADEAN: [id number]; Please substitute for covered alternative per insurance request.       Problem list updated Feb 16, 2022   No problems updated.      Feb 16, 2022  - Continue with regular UDS and PEth testing. Will call outpt lab to figure out possible alternate lab draw with blood smear  - Continue suboxone without changes. Will allow for less frequent visits after our next visit, if he continues to participate in programming as stated  - Minimal withdrawals. Taking gabapentin twice daily  - PEth nearly equal to last value, which does not represent a significant decrease in alcohol use, as reported      Last encounter A/P  Feb 2, 2022  - Appears to be in withdrawal from alcohol  - He is minimizing use and severity of withdrawal, despite his difficulty holding a pen and writing, obvious sweating, and elevated BP. He does appear scared, however further prompting elicits no concern from him regarding his safety or well-being  - No history of seizures or severe withdrawal. He notes this is a new experience for him. Denies any recent BZD use. AST and GGT mildly elevated. Electrolytes wnl. Risk for complicated withdrawal and seizure is moderate, though home detoxification is appropriate given the above factors  - The combination of alcohol and suboxone remains concerning.  Advised that he must stop alcohol use and attend treatment given his calm, unconcerned responses are incongruent to his appearance with multiple signs of gila withdrawal, and his repeated misrepresentation of substance use  - Justus continues to come to visits and ask for support, so I do believe he will pursue further treatment and support, and abstinence is a shared goal at this time  - Will monitor PEth testing and ensure levels drop and eventually remain negative as he engages with treatment  - He has not setup intake for his outpatient program; I will call him tomorrow to ensure this has occurred. 1 week suboxone prescribed today, as he continues to make efforts toward cessation. Arriving today in alcohol withdrawal is a clear sign of his attempts to seek abstinence  - If he is not able to setup intake with [facility], I will decrease his suboxone to 2mg daily, down from 4mg, and continue the taper process  - Starting gabapentin today for withdrawal mgmt, as above. I advised that, if his symptoms worsen, it would be best for him to report to our [place] ED for withdrawal mgmt  - He will need close monitoring for outpaitent withdrawal management; I will plan to call him tomorrow and my staff will reach out next week for further follow-up. We will see him again in clinic in 2 weeks for f/up      PDMP Review       Value Time User    State PDMP site checked  Yes 2/2/2022  9:47 AM Jesu Arciniega MD            RTC  Return in 13 days (on 3/1/2022) for 12:30pm, in person.      Counseled the patient on the importance of having a recovery program in addition to medication to manage recovery.  Components include avoiding isolating, having willingness to change, avoiding triggers and managing cravings. Encouraged having some type of sober network and practicing honesty with trusted support person(s). Encouraged other services such as counseling, 12 step or other self-help organizations.      Opioid warning  reviewed.  Risk of overdose following a period of abstinence due to decrease tolerance was discussed including risk of death.  Strongly recommended abstain from alcohol, benzodiazepines, THC, opioids and other drugs of abuse.  Increased risk of return to opioid use after use of these substances discussed.  Increased risk of overdose/death with use of other substances particularly benzodiazepines/alcohol reviewed.        SUBJECTIVE                                                    Justus Cope is a 51 year old male who presents to clinic today for follow up    Visit performed In Person, face-to-face      Rooming information:  Any substance use since last appt?: Yes  Side effects related to medication for substance use (constipation, dry mouth, sedation?) No  Narcan currently available: Yes  Other recent substance use:     Alcohol    NICOTINE-Yes:   If using nicotine, ready to quit? No wants to quit alcohol first       PHQ-9 Score:   PHQ 10/21/2021 1/17/2022 2/16/2022   PHQ-9 Total Score 0 1 0   Q9: Thoughts of better off dead/self-harm past 2 weeks Not at all Not at all Not at all       ROOPA-7 Score:  ROOPA-7 SCORE 10/21/2021 2/16/2022   Total Score 3 (minimal anxiety) 3 (minimal anxiety)   Total Score 3 3         Primary care provider: Physician No Ref-Primary     Contact information up to date? yes    3rd Party Involvement not at this time (please obtain ROBBIE if pt would like to include)    Jennifer Manzo MA  February 16, 2022  9:06 AM        Recent HPI Details:  HPI Sep 22, 2021  - Continues to report positive mood, no concerns with life, work, or his relationships  - Continues to report inconsistent alcohol use  - When discussing need for treatment, he indicates understanding and agreement  - Denies any cravings or opioid use  HPI Oct 21, 2021  - Is signed up with [facility] - Beulah Barboza, ph# [phone] - Steven provides consent for me to talk with her about his medical care  - Decided to change  location/program as it is closer to home, and he had missed his intake  - Working more often, 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 Having a hectic day at work  - Denies substance use  - Wants to start at [facility] ASAP  - Mood irritated, stressed but denies any functional concerns  HPI Jan 18, 2022  - He signed up with [facility] for outpatient treatment and plans to start in 1-2 weeks; 2 days weekly  - Agrees to stop alcohol use today  - Mood stable, positive today  - Denies any additional work stressors or life changes  - Denies other substance use or concerns  HPI Feb 2, 2022  - Reports quitting alcohol within the past 2 days  - Shaking with tremors, sweating, and notices his high BP. Denies any CP, SOB, hallucinations or perceptual disturbances, HA, n/v. Notes minimal anxiety above baseline  - Recognizes this is a new withdrawal experience for him; he has not felt symtoms of this nature in the past  - Continues to use suboxone without changes  - Has not yet setup an appt with [facility] to start treatment      TODAY'S VISIT  HPI Feb 16, 2022  - Reports getting setup with [facility] counseling for outpatient treatment. Will be seeing them twice a week in group, then individually once a week. Schedule is able to be flexible around his work schedule  - He is hoping for an in person group as well; the rest of it is virtual  - Reports 2 episodes of alcohol use, Thursday and Tuesday  - Indicates difficulty with coping through stress. Neck pain also increased d/t drinking less alcohol. Has cravings at times, but is able to distract himself for the most part  - Taking gabapentin 300mg BID      OBJECTIVE                                                     PHYSICAL EXAM:  BP (!) 166/93   Pulse 101   Temp 98.7  F (37.1  C) (Temporal)   Wt 47.6 kg (105 lb)   SpO2 97%   BMI 15.97 kg/m      GENERAL: healthy, alert and no distress  EYES: Eyes grossly normal to inspection, PERRL and conjunctivae and sclerae normal  RESP: No respiratory distress  MENTAL STATUS EXAM  Appearance/Behavior: No appearant distress  Speech: Normal  Mood/Affect: normal affect  Insight: Adequate    LAB  Results for orders placed or performed in visit on 02/16/22   Phosphatidylethanol (PEth)     Status: Abnormal   Result Value Ref Range    Phosphatidylethanol (PEth) 1720 (H) NEGATIVE ng/mL    Narrative    Performed at:  [facility]  [address] [place] [address]  [id number]  : Jolene Hidalgo [facility], Phone:  [phone]   Urine Drugs of Abuse Screen Panel 13     Status: Abnormal   Result Value Ref Range    Cannabinoids (20-gbl-8-carboxy-9-THC) Not Detected Not Detected, Indeterminate    Phencyclidine Not Detected Not Detected, Indeterminate    Cocaine (Benzoylecgonine) Not Detected Not Detected, Indeterminate    Methamphetamine (d-Methamphetamine) Not Detected Not Detected, Indeterminate    Opiates (Morphine) Not Detected Not Detected, Indeterminate    Amphetamine (d-Amphetamine) Not Detected Not Detected, Indeterminate    Benzodiazepines (Nordiazepam) Detected (A) Not Detected, Indeterminate    Tricyclic Antidepressants (Desipramine) Not Detected Not Detected, Indeterminate    Methadone Not Detected Not Detected, Indeterminate    Barbiturates (Butalbital) Not Detected Not Detected, Indeterminate    Oxycodone Not Detected Not Detected, Indeterminate    Propoxyphene (Norpropoxyphene) Not Detected Not Detected, Indeterminate    Buprenorphine Detected (A) Not Detected, Indeterminate         HISTORY                                                    Problem list reviewed & adjusted, as indicated.  Patient Active Problem List    Diagnosis     Chronic neck pain     Alcohol use disorder, mild, abuse     Depression with anxiety     Tobacco dependence syndrome     Uncomplicated opioid dependence (H)         MEDICATION LIST (prior to visit)  cloNIDine (CATAPRES) 0.1 MG tablet, Take 1-2 tablets (0.1-0.2 mg) by mouth 3 times daily as needed (sweating, high bp)  gabapentin (NEURONTIN) 300 MG capsule, Take 1-2 capsules (300-600 mg) by mouth 4 times daily  nicotine (NICODERM CQ) 14 MG/24HR 24 hr patch, Place 1 patch onto the skin every 24 hours (Patient not taking: Reported on 2/16/2022)  nicotine polacrilex (NICORETTE) 4 MG gum, Place 1 each (4 mg) inside cheek every hour as needed for smoking cessation (Patient not taking: Reported on 2/2/2022)    No current facility-administered medications on file prior to visit.      MEDICATION LIST (after visit)  Current Outpatient Medications   Medication     buprenorphine-naloxone (SUBOXONE) 2-0.5 MG SUBL sublingual tablet     cloNIDine (CATAPRES) 0.1 MG tablet     gabapentin (NEURONTIN) 300 MG capsule     nicotine (NICODERM CQ) 14 MG/24HR 24 hr patch     nicotine polacrilex (NICORETTE) 4 MG gum     No current facility-administered medications for this visit.         No Known Allergies    Additional MDM Details:  none    Jesu Arciniega MD  [facility]  [phone]

## 2022-02-17 ASSESSMENT — ANXIETY QUESTIONNAIRES: GAD7 TOTAL SCORE: 3

## 2022-02-17 ASSESSMENT — PATIENT HEALTH QUESTIONNAIRE - PHQ9: SUM OF ALL RESPONSES TO PHQ QUESTIONS 1-9: 0

## 2022-02-21 LAB — PETH BLD-MCNC: 1720 NG/ML

## 2022-03-02 ENCOUNTER — OFFICE VISIT (OUTPATIENT)
Dept: ADDICTION MEDICINE | Facility: CLINIC | Age: 52
End: 2022-03-02
Payer: COMMERCIAL

## 2022-03-02 VITALS
SYSTOLIC BLOOD PRESSURE: 158 MMHG | WEIGHT: 103.06 LBS | DIASTOLIC BLOOD PRESSURE: 94 MMHG | BODY MASS INDEX: 15.67 KG/M2 | HEART RATE: 94 BPM

## 2022-03-02 DIAGNOSIS — F10.930 ALCOHOL WITHDRAWAL SYNDROME WITHOUT COMPLICATION (H): ICD-10-CM

## 2022-03-02 DIAGNOSIS — F11.90 OPIOID USE DISORDER: ICD-10-CM

## 2022-03-02 DIAGNOSIS — F10.20 MODERATE ALCOHOL USE DISORDER (H): ICD-10-CM

## 2022-03-02 DIAGNOSIS — F11.20 UNCOMPLICATED OPIOID DEPENDENCE (H): Primary | ICD-10-CM

## 2022-03-02 LAB — CREAT UR-MCNC: 20 MG/DL

## 2022-03-02 PROCEDURE — 80307 DRUG TEST PRSMV CHEM ANLYZR: CPT | Performed by: FAMILY MEDICINE

## 2022-03-02 PROCEDURE — 99215 OFFICE O/P EST HI 40 MIN: CPT | Performed by: FAMILY MEDICINE

## 2022-03-02 PROCEDURE — 80321 ALCOHOLS BIOMARKERS 1OR 2: CPT | Mod: 90 | Performed by: FAMILY MEDICINE

## 2022-03-02 PROCEDURE — 36415 COLL VENOUS BLD VENIPUNCTURE: CPT | Performed by: FAMILY MEDICINE

## 2022-03-02 RX ORDER — BUPRENORPHINE HYDROCHLORIDE AND NALOXONE HYDROCHLORIDE DIHYDRATE 2; .5 MG/1; MG/1
1 TABLET SUBLINGUAL 2 TIMES DAILY
Qty: 30 TABLET | Refills: 0 | Status: SHIPPED | OUTPATIENT
Start: 2022-03-02 | End: 2022-03-17

## 2022-03-02 NOTE — PROGRESS NOTES
[facility]    A/P                                                    ASSESSMENT/PLAN  Diagnoses and all orders for this visit:  Uncomplicated opioid dependence (H)  -     Drug Confirmation Panel Urine with Creat - lab collect; Future  -     buprenorphine-naloxone (SUBOXONE) 2-0.5 MG SUBL sublingual tablet; Place 1 tablet under the tongue 2 times daily  Opioid use disorder  Alcohol withdrawal syndrome without complication (H)  -     Drug Confirmation Panel Urine with Creat - lab collect; Future  Moderate alcohol use disorder (H)  -     Phosphatidylethanol (PEth), Whole Blood; Future    Orders Placed This Encounter   Medications     buprenorphine-naloxone (SUBOXONE) 2-0.5 MG SUBL sublingual tablet     Sig: Place 1 tablet under the tongue 2 times daily     Dispense:  30 tablet     Refill:  0     NADEAN: [id number]; Please substitute for covered alternative per insurance request.       Problem list updated Mar 2, 2022   No problems updated.      Mar 2, 2022  - Emphasized the importance of attending outpatient treatment. I reached out and discussed his case with RAY Dejesus at [facility]. He indicated group treatment is in [place], [address] [place] [address] (verified). Steven can arrive for group tomorrow (Thursday) at 9am, or Monday/Tuesday next week at the same time. If he does not attend, he will be discharged  - Reviewed Marcus's reports from [facility] outpatient treatment, which were sent via fax, and indicated Steven has not attended group after failing to connect virtually his 1st week.  - If Steven is unable to attend this outpatient group, he will be given 1 week to complete another assessment. If he does not complete the assessment, I will either taper his dose of suboxone or require residential treatment.  - His co-morbid alcohol and suboxone use, especially in the setting of continued, failed efforts toward outpatient tx, is not sustainable with outpatient management. Unless he is able to  stop alcohol altogether, he will be tapered off suboxone or required to attend residential treatment to ensure his safety and well-being.  - Repeat UDS and PEth testing. If his alcohol use has not decreased based on lab evaluation, I have concerns about the accuracy of his reports and my ability to trust his reports of alcohol use.  - If ANY other substances are present besides gabapentin, we will taper him off suboxone or require residential treatment.      Last encounter A/P  Feb 16, 2022  - Continue with regular UDS and PEth testing. Will call outpt lab to figure out possible alternate lab draw with blood smear  - Continue suboxone without changes. Will allow for less frequent visits after our next visit, if he continues to participate in programming as stated  - Minimal withdrawals. Taking gabapentin twice daily  - PEth nearly equal to last value, which does not represent a significant decrease in alcohol use, as reported      PDMP Review       Value Time User    State PDMP site checked  Yes 3/2/2022 10:39 AM Jesu Arciniega MD            RTC  Return in 15 days (on 3/17/2022) for 10:30am, in person.      Counseled the patient on the importance of having a recovery program in addition to medication to manage recovery.  Components include avoiding isolating, having willingness to change, avoiding triggers and managing cravings. Encouraged having some type of sober network and practicing honesty with trusted support person(s). Encouraged other services such as counseling, 12 step or other self-help organizations.      Opioid warning reviewed.  Risk of overdose following a period of abstinence due to decrease tolerance was discussed including risk of death.  Strongly recommended abstain from alcohol, benzodiazepines, THC, opioids and other drugs of abuse.  Increased risk of return to opioid use after use of these substances discussed.  Increased risk of overdose/death with use of other substances particularly  benzodiazepines/alcohol reviewed.        SUBJECTIVE                                                    Justus Cope is a 51 year old male who presents to clinic today for follow up    Visit performed In Person, face-to-face      Rooming information:  Any substance use since last appt?: Yes- drinking   Side effects related to medication for substance use (constipation, dry mouth, sedation?) No   Narcan currently available: Yes  Other recent substance use:     Alcohol- last drank two days ago    NICOTINE-Yes:   If using nicotine, ready to quit? No      PHQ-9 Score:   PHQ 10/21/2021 1/17/2022 2/16/2022   PHQ-9 Total Score 0 1 0   Q9: Thoughts of better off dead/self-harm past 2 weeks Not at all Not at all Not at all       ROOPA-7 Score:  ROOPA-7 SCORE 10/21/2021 2/16/2022   Total Score 3 (minimal anxiety) 3 (minimal anxiety)   Total Score 3 3         Primary care provider: Physician No Ref-Primary     Contact information up to date? yes    3rd Party Involvement not at this time (please obtain ROBBIE if pt would like to include)    Jennifer Manzo MA  March 2, 2022  10:15 AM        Recent HPI Details:  HPI Sep 22, 2021  - Continues to report positive mood, no concerns with life, work, or his relationships  - Continues to report inconsistent alcohol use  - When discussing need for treatment, he indicates understanding and agreement  - Denies any cravings or opioid use  HPI Oct 21, 2021  - Is signed up with [facility] - Beulah Barboza, ph# [phone] - Steven provides consent for me to talk with her about his medical care  - Decided to change location/program as it is closer to home, and he had missed his intake  - Working more often, 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 Having a hectic day at work  - Denies substance use  - Wants to start at [facility] ASAP  - Mood irritated, stressed but denies any functional concerns  HPI Jan 18, 2022  - He signed up with [facility] for outpatient treatment and plans to start in 1-2 weeks; 2 days weekly  - Agrees to stop alcohol use today  - Mood stable, positive today  - Denies any additional work stressors or life changes  - Denies other substance use or concerns  HPI Feb 2, 2022  - Reports quitting alcohol within the past 2 days  - Shaking with tremors, sweating, and notices his high BP. Denies any CP, SOB, hallucinations or perceptual disturbances, HA, n/v. Notes minimal anxiety above baseline  - Recognizes this is a new withdrawal experience for him; he has not felt symtoms of this nature in the past  - Continues to use suboxone without changes  - Has not yet setup an appt with [facility] to start treatment  HPI Feb 16, 2022  - Reports getting setup with [facility] counseling for outpatient treatment. Will be seeing them twice a week in group, then individually once a week. Schedule is able to be flexible around his work schedule  - He is hoping for an in person group as well; the rest of it is virtual  - Reports 2 episodes of alcohol use, Thursday and Tuesday  - Indicates difficulty with coping through stress. Neck pain also increased d/t drinking less alcohol. Has cravings at times, but is able to distract himself for the most part  - Taking gabapentin 300mg BID      TODAY'S VISIT  HPI Mar 2, 2022  - Initially reporting minimal alcohol use. I discussed that his reports of use at our last visit were not reflected in his PEth testing results (only decreased by 10%, while he reported at least 50% less drinking)  - He agreed with my assessment. Stated he is drinking \"a little less than last time, but only a little\"  - Has been telling his coworkers that he has a stomach " ulcer, which is why he won't be able to take shots at work with them  - Has attempted to engage with [facility], and reports he has not received a call back. Is confused about where the group is meeting      OBJECTIVE                                                    PHYSICAL EXAM:  BP (!) 158/94   Pulse 94   Wt 46.7 kg (103 lb 0.9 oz)   BMI 15.67 kg/m      GENERAL: healthy, alert and no distress  EYES: Eyes grossly normal to inspection, PERRL and conjunctivae and sclerae normal  RESP: No respiratory distress  MENTAL STATUS EXAM  Appearance/Behavior: No appearant distress  Speech: Normal  Mood/Affect: normal affect  Insight: limited    LAB  Results for orders placed or performed in visit on 03/02/22   Drug Confirmation Panel Urine with Creat - lab collect     Status: None (In process)    Narrative    The following orders were created for panel order Drug Confirmation Panel Urine with Creat - lab collect.  Procedure                               Abnormality         Status                     ---------                               -----------         ------                     Urine Drug Confirmation ...[[id number]]                      In process                 Urine Creatinine for Ehsan...[[id number]]                      In process                   Please view results for these tests on the individual orders.         HISTORY                                                    Problem list reviewed & adjusted, as indicated.  Patient Active Problem List   Diagnosis     Chronic neck pain     Alcohol use disorder, mild, abuse     Depression with anxiety     Tobacco dependence syndrome     Uncomplicated opioid dependence (H)         MEDICATION LIST (prior to visit)  cloNIDine (CATAPRES) 0.1 MG tablet, Take 1-2 tablets (0.1-0.2 mg) by mouth 3 times daily as needed (sweating, high bp)  gabapentin (NEURONTIN) 300 MG capsule, Take 1-2 capsules (300-600 mg) by mouth 4 times daily  nicotine (NICODERM CQ) 14 MG/24HR 24 hr  patch, Place 1 patch onto the skin every 24 hours (Patient not taking: Reported on 2/16/2022)  nicotine polacrilex (NICORETTE) 4 MG gum, Place 1 each (4 mg) inside cheek every hour as needed for smoking cessation (Patient not taking: Reported on 2/2/2022)    No current facility-administered medications on file prior to visit.      MEDICATION LIST (after visit)  Current Outpatient Medications   Medication     buprenorphine-naloxone (SUBOXONE) 2-0.5 MG SUBL sublingual tablet     cloNIDine (CATAPRES) 0.1 MG tablet     gabapentin (NEURONTIN) 300 MG capsule     nicotine (NICODERM CQ) 14 MG/24HR 24 hr patch     nicotine polacrilex (NICORETTE) 4 MG gum     No current facility-administered medications for this visit.         No Known Allergies    Additional MDM Details:  Review of external notes as documented elsewhere in note  Discussion of management or test interpretation with external physician/other qualified healthcare professional/appropriate source - [facility]  Ordering of each unique test  Prescription drug management        Jesu Arciniega MD  [facility]  [phone]

## 2022-03-04 LAB
BUPRENORPHINE UR CFM-MCNC: 98 NG/ML
BUPRENORPHINE/CREAT UR: 490 NG/MG {CREAT}
NORBUPRENORPHINE UR CFM-MCNC: 41 NG/ML
NORBUPRENORPHINE/CREAT UR: 205 NG/MG {CREAT}

## 2022-03-05 LAB
PLPETH BLD-MCNC: 1137 NG/ML
POPETH BLD-MCNC: 1875 NG/ML

## 2022-03-09 ENCOUNTER — TELEPHONE (OUTPATIENT)
Dept: ADDICTION MEDICINE | Facility: CLINIC | Age: 52
End: 2022-03-09
Payer: COMMERCIAL

## 2022-03-09 NOTE — CONFIDENTIAL NOTE
Called Steven to check in, LVM. PEth testing showing stable, high amount of alcohol use.    Appears Steven has not attended outpatient as arranged.    As he continues to drink heavily and is not engaging in treatment, I will taper him down on suboxone. We will discuss this at his next clinic visit, or via phone prior to that if he calls back.    At next visit, will reduce suboxone to 3mg, then down to 2mg by follow-up 2 weeks later. Will continue to encourage treatment for alcohol use disorder as well.    Jesu Arciniega MD

## 2022-03-17 ENCOUNTER — OFFICE VISIT (OUTPATIENT)
Dept: ADDICTION MEDICINE | Facility: CLINIC | Age: 52
End: 2022-03-17
Payer: COMMERCIAL

## 2022-03-17 VITALS — SYSTOLIC BLOOD PRESSURE: 157 MMHG | HEART RATE: 120 BPM | DIASTOLIC BLOOD PRESSURE: 105 MMHG

## 2022-03-17 DIAGNOSIS — F10.930 ALCOHOL WITHDRAWAL SYNDROME WITHOUT COMPLICATION (H): ICD-10-CM

## 2022-03-17 DIAGNOSIS — F10.10 ALCOHOL USE DISORDER, MILD, ABUSE: ICD-10-CM

## 2022-03-17 DIAGNOSIS — F11.20 UNCOMPLICATED OPIOID DEPENDENCE (H): ICD-10-CM

## 2022-03-17 PROCEDURE — 80306 DRUG TEST PRSMV INSTRMNT: CPT | Performed by: FAMILY MEDICINE

## 2022-03-17 PROCEDURE — 99214 OFFICE O/P EST MOD 30 MIN: CPT | Performed by: FAMILY MEDICINE

## 2022-03-17 RX ORDER — GABAPENTIN 300 MG/1
300-600 CAPSULE ORAL 4 TIMES DAILY
Qty: 60 CAPSULE | Refills: 1 | Status: SHIPPED | OUTPATIENT
Start: 2022-03-17 | End: 2022-03-30

## 2022-03-17 RX ORDER — CLONIDINE HYDROCHLORIDE 0.1 MG/1
0.1 TABLET ORAL 3 TIMES DAILY PRN
Qty: 30 TABLET | Refills: 1 | Status: SHIPPED | OUTPATIENT
Start: 2022-03-17 | End: 2022-03-30

## 2022-03-17 RX ORDER — BUPRENORPHINE HYDROCHLORIDE AND NALOXONE HYDROCHLORIDE DIHYDRATE 2; .5 MG/1; MG/1
1 TABLET SUBLINGUAL 2 TIMES DAILY
Qty: 30 TABLET | Refills: 0 | Status: SHIPPED | OUTPATIENT
Start: 2022-03-17 | End: 2022-03-30

## 2022-03-17 NOTE — PROGRESS NOTES
[facility] Addiction Medicine    A/P                                                    ASSESSMENT/PLAN  Diagnoses and all orders for this visit:  Alcohol use disorder, mild, abuse  -     gabapentin (NEURONTIN) 300 MG capsule; Take 1-2 capsules (300-600 mg) by mouth 4 times daily  Alcohol withdrawal syndrome without complication (H)  -     cloNIDine (CATAPRES) 0.1 MG tablet; Take 1 tablet (0.1 mg) by mouth 3 times daily as needed (sweating, high bp)  Uncomplicated opioid dependence (H)  -     buprenorphine-naloxone (SUBOXONE) 2-0.5 MG SUBL sublingual tablet; Place 1 tablet under the tongue 2 times daily  -     Urine Drugs of Abuse Screen Panel 13; Future    Orders Placed This Encounter   Medications     gabapentin (NEURONTIN) 300 MG capsule     Sig: Take 1-2 capsules (300-600 mg) by mouth 4 times daily     Dispense:  60 capsule     Refill:  1     cloNIDine (CATAPRES) 0.1 MG tablet     Sig: Take 1 tablet (0.1 mg) by mouth 3 times daily as needed (sweating, high bp)     Dispense:  30 tablet     Refill:  1     buprenorphine-naloxone (SUBOXONE) 2-0.5 MG SUBL sublingual tablet     Sig: Place 1 tablet under the tongue 2 times daily     Dispense:  30 tablet     Refill:  0     NADEAN: [id number]; Please substitute for covered alternative per insurance request.       Problem list updated Mar 17, 2022   No problems updated.      Mar 17, 2022  - Continues to struggle with alcohol use, recurrent withdrawals  - Noting gabapentin and clonidine helping, though he is using these sparingly, less than 1 per day. Advised he create more of a consistent schedule of using the medications to help treat withdrawal sx  - OK to continue suboxone, as he remains committed to the goal of alcohol abstinence  - Indicates he needs no support to start treatment, and will pursue further options after being discharged for not attending this past group at [facility]      Last encounter A/P  Mar 2, 2022  - Emphasized the importance of  attending outpatient treatment. I reached out and discussed his case with RAY Dejesus at [facility]. He indicated group treatment is in [place], [address] (verified). Steven can arrive for group tomorrow (Thursday) at 9am, or Monday/Tuesday next week at the same time. If he does not attend, he will be discharged  - Reviewed Marcus's reports from [facility] outpatient treatment, which were sent via fax, and indicated Steven has not attended group after failing to connect virtually his 1st week.  - If Steven is unable to attend this outpatient group, he will be given 1 week to complete another assessment. If he does not complete the assessment, I will either taper his dose of suboxone or require residential treatment.  - His co-morbid alcohol and suboxone use, especially in the setting of continued, failed efforts toward outpatient tx, is not sustainable with outpatient management. Unless he is able to stop alcohol altogether, he will be tapered off suboxone or required to attend residential treatment to ensure his safety and well-being.  - Repeat UDS and PEth testing. If his alcohol use has not decreased based on lab evaluation, I have concerns about the accuracy of his reports and my ability to trust his reports of alcohol use.  - If ANY other substances are present besides gabapentin, we will taper him off suboxone or require residential treatment.      PDMP Review       Value Time User    State PDMP site checked  Yes 3/17/2022 11:23 AM Jesu Arciniega MD            RTC  Return in 13 days (on 3/30/2022) for 9am, in person.      Counseled the patient on the importance of having a recovery program in addition to medication to manage recovery.  Components include avoiding isolating, having willingness to change, avoiding triggers and managing cravings. Encouraged having some type of sober network and practicing honesty with trusted support person(s). Encouraged other services such as counseling, 12 step or  other self-help organizations.      Opioid warning reviewed.  Risk of overdose following a period of abstinence due to decrease tolerance was discussed including risk of death.  Strongly recommended abstain from alcohol, benzodiazepines, THC, opioids and other drugs of abuse.  Increased risk of return to opioid use after use of these substances discussed.  Increased risk of overdose/death with use of other substances particularly benzodiazepines/alcohol reviewed.        SUBJECTIVE                                                    Justus Cope is a 52 year old male who presents to clinic today for follow up    Visit performed In Person, face-to-face      Rooming information:  Any substance use since last appt?: Yes- last drank two days ago   Side effects related to medication for substance use (constipation, dry mouth, sedation?) No   Narcan currently available: Yes  Other recent substance use:     Alcohol    NICOTINE-Yes:   If using nicotine, ready to quit? No    PHQ-9 Score:   PHQ 10/21/2021 1/17/2022 2/16/2022   PHQ-9 Total Score 0 1 0   Q9: Thoughts of better off dead/self-harm past 2 weeks Not at all Not at all Not at all       ROOPA-7 Score:  ROOPA-7 SCORE 10/21/2021 2/16/2022   Total Score 3 (minimal anxiety) 3 (minimal anxiety)   Total Score 3 3     Primary care provider: Physician No Ref-Primary     Contact information up to date? 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 Having a hectic day at work  - Denies substance use  - Wants to start at [facility] ASAP  - Mood irritated, stressed but denies any functional concerns  HPI Jan 18, 2022  - He signed up with [facility] for outpatient treatment and plans to start in 1-2 weeks; 2 days weekly  - Agrees to stop alcohol use today  - Mood stable, positive today  - Denies any additional work stressors or life changes  - Denies other substance use or concerns  HPI Feb 2, 2022  - Reports quitting alcohol within the past 2 days  - Shaking with tremors, sweating, and notices his high BP. Denies any CP, SOB, hallucinations or perceptual disturbances, HA, n/v. Notes minimal anxiety above baseline  - Recognizes this is a new withdrawal experience for him; he has not felt symtoms of this nature in the past  - Continues to use suboxone without changes  - Has not yet setup an appt with [facility] to start treatment  HPI Feb 16, 2022  - Reports getting setup with [facility] counseling for outpatient treatment. Will be seeing them twice a week in group, then individually once a week. Schedule is able to be flexible around his work schedule  - He is hoping for an in person group as well; the rest of it is virtual  - Reports 2 episodes of alcohol use, Thursday and Tuesday  - Indicates difficulty with coping through stress. Neck pain also increased d/t drinking less alcohol. Has cravings at times, but is able to distract himself for the most part  - Taking gabapentin 300mg BID  HPI Mar 2, 2022  - Initially reporting minimal alcohol use. I discussed that his reports of use at our last visit were not reflected in his PEth testing results (only decreased by 10%, while he reported at least 50% less drinking)  - He agreed with my assessment.  "Stated he is drinking \"a little less than last time, but only a little\"  - Has been telling his coworkers that he has a stomach ulcer, which is why he won't be able to take shots at work with them  - Has attempted to engage with [facility], and reports he has not received a call back. Is confused about where the group is meeting      TODAY'S VISIT  HPI Mar 17, 2022  - Wants to continue with treatment; did not realize he was discharged from [facility] but is motivated to enter another treatment center ASAP  - Continues to drink, though trying to stop intermittently  - Using gabapentin/clonidine sparingly  - Reports being unsure about exactly how much he is drinking  - No concerns with suboxone. Pain improved on this      OBJECTIVE                                                    PHYSICAL EXAM:  BP (!) 157/105   Pulse 120     GENERAL: healthy, alert, somewhat shaky  EYES: Eyes grossly normal to inspection, PERRL and conjunctivae and sclerae normal  RESP: No respiratory distress  MENTAL STATUS EXAM  Appearance/Behavior: Restless  Speech: Normal  Mood/Affect: anxiety  Insight: Fair    LAB  Results for orders placed or performed in visit on 03/17/22   Urine Drugs of Abuse Screen Panel 13     Status: Abnormal   Result Value Ref Range    Cannabinoids (04-xit-8-carboxy-9-THC) Not Detected Not Detected, Indeterminate    Phencyclidine Not Detected Not Detected, Indeterminate    Cocaine (Benzoylecgonine) Not Detected Not Detected, Indeterminate    Methamphetamine (d-Methamphetamine) Not Detected Not Detected, Indeterminate    Opiates (Morphine) Not Detected Not Detected, Indeterminate    Amphetamine (d-Amphetamine) Not Detected Not Detected, Indeterminate    Benzodiazepines (Nordiazepam) Detected (A) Not Detected, Indeterminate    Tricyclic Antidepressants (Desipramine) Not Detected Not Detected, Indeterminate    Methadone Not Detected Not Detected, Indeterminate    Barbiturates (Butalbital) Not Detected Not Detected, " Indeterminate    Oxycodone Not Detected Not Detected, Indeterminate    Propoxyphene (Norpropoxyphene) Not Detected Not Detected, Indeterminate    Buprenorphine Detected (A) Not Detected, Indeterminate         HISTORY                                                    Problem list reviewed & adjusted, as indicated.  Patient Active Problem List   Diagnosis     Chronic neck pain     Alcohol use disorder, mild, abuse     Depression with anxiety     Tobacco dependence syndrome     Uncomplicated opioid dependence (H)         MEDICATION LIST (prior to visit)  nicotine (NICODERM CQ) 14 MG/24HR 24 hr patch, Place 1 patch onto the skin every 24 hours (Patient not taking: Reported on 2/16/2022)  nicotine polacrilex (NICORETTE) 4 MG gum, Place 1 each (4 mg) inside cheek every hour as needed for smoking cessation (Patient not taking: Reported on 2/2/2022)    No current facility-administered medications on file prior to visit.      MEDICATION LIST (after visit)  Current Outpatient Medications   Medication     buprenorphine-naloxone (SUBOXONE) 2-0.5 MG SUBL sublingual tablet     cloNIDine (CATAPRES) 0.1 MG tablet     gabapentin (NEURONTIN) 300 MG capsule     nicotine (NICODERM CQ) 14 MG/24HR 24 hr patch     nicotine polacrilex (NICORETTE) 4 MG gum     No current facility-administered medications for this visit.         No Known Allergies    Additional MDM Details:  none    Jesu Arciniega MD  [facility] Addiction Medicine  [phone]

## 2022-03-27 ENCOUNTER — HEALTH MAINTENANCE LETTER (OUTPATIENT)
Age: 52
End: 2022-03-27

## 2022-03-30 ENCOUNTER — VIRTUAL VISIT (OUTPATIENT)
Dept: ADDICTION MEDICINE | Facility: CLINIC | Age: 52
End: 2022-03-30
Payer: COMMERCIAL

## 2022-03-30 DIAGNOSIS — F10.930 ALCOHOL WITHDRAWAL SYNDROME WITHOUT COMPLICATION (H): ICD-10-CM

## 2022-03-30 DIAGNOSIS — F10.10 ALCOHOL USE DISORDER, MILD, ABUSE: ICD-10-CM

## 2022-03-30 DIAGNOSIS — F11.20 UNCOMPLICATED OPIOID DEPENDENCE (H): ICD-10-CM

## 2022-03-30 PROCEDURE — 99214 OFFICE O/P EST MOD 30 MIN: CPT | Mod: 95 | Performed by: FAMILY MEDICINE

## 2022-03-30 RX ORDER — BUPRENORPHINE HYDROCHLORIDE AND NALOXONE HYDROCHLORIDE DIHYDRATE 2; .5 MG/1; MG/1
1 TABLET SUBLINGUAL 2 TIMES DAILY
Qty: 30 TABLET | Refills: 0 | Status: SHIPPED | OUTPATIENT
Start: 2022-03-30 | End: 2022-04-13

## 2022-03-30 RX ORDER — CLONIDINE HYDROCHLORIDE 0.1 MG/1
0.1 TABLET ORAL 3 TIMES DAILY PRN
Qty: 30 TABLET | Refills: 1 | Status: SHIPPED | OUTPATIENT
Start: 2022-03-30 | End: 2022-07-21

## 2022-03-30 RX ORDER — GABAPENTIN 300 MG/1
300-600 CAPSULE ORAL 4 TIMES DAILY
Qty: 90 CAPSULE | Refills: 1 | Status: SHIPPED | OUTPATIENT
Start: 2022-03-30 | End: 2022-04-29

## 2022-03-30 ASSESSMENT — PATIENT HEALTH QUESTIONNAIRE - PHQ9
SUM OF ALL RESPONSES TO PHQ QUESTIONS 1-9: 0
SUM OF ALL RESPONSES TO PHQ QUESTIONS 1-9: 0
10. IF YOU CHECKED OFF ANY PROBLEMS, HOW DIFFICULT HAVE THESE PROBLEMS MADE IT FOR YOU TO DO YOUR WORK, TAKE CARE OF THINGS AT HOME, OR GET ALONG WITH OTHER PEOPLE: NOT DIFFICULT AT ALL

## 2022-03-30 NOTE — PROGRESS NOTES
Steven is a 52 year old who is being evaluated via a billable telephone visit.      What phone number would you like to be contacted at? [phone]  How would you like to obtain your AVS? [facility]    A/P                                                    ASSESSMENT/PLAN  Diagnoses and all orders for this visit:  Uncomplicated opioid dependence (H)  -     buprenorphine-naloxone (SUBOXONE) 2-0.5 MG SUBL sublingual tablet; Place 1 tablet under the tongue 2 times daily  Alcohol withdrawal syndrome without complication (H)  -     cloNIDine (CATAPRES) 0.1 MG tablet; Take 1 tablet (0.1 mg) by mouth 3 times daily as needed (sweating, high bp)  Alcohol use disorder, mild, abuse  -     gabapentin (NEURONTIN) 300 MG capsule; Take 1-2 capsules (300-600 mg) by mouth 4 times daily    Orders Placed This Encounter   Medications     buprenorphine-naloxone (SUBOXONE) 2-0.5 MG SUBL sublingual tablet     Sig: Place 1 tablet under the tongue 2 times daily     Dispense:  30 tablet     Refill:  0     NADEAN: [id number]; Please substitute for covered alternative per insurance request.     cloNIDine (CATAPRES) 0.1 MG tablet     Sig: Take 1 tablet (0.1 mg) by mouth 3 times daily as needed (sweating, high bp)     Dispense:  30 tablet     Refill:  1     gabapentin (NEURONTIN) 300 MG capsule     Sig: Take 1-2 capsules (300-600 mg) by mouth 4 times daily     Dispense:  90 capsule     Refill:  1       Problem list updated Mar 30, 2022   No problems updated.      Mar 30, 2022  - Continue suboxone, no changes  - Attempting to enter programming, facilitated by [facility], near his home  - Attending AA  - Using gabapentin and clonidine more consistently. Reports no alcohol for the past week, and starting to feel withdrawals improve  - Advised it would be safe to stop clonidine if he is noticing less sweating and/or high bp; can be taken PRN for anxiety as well  - No SE noted  - Continue q2wk  appt's while working through alcohol abstinence      Last encounter A/P  Mar 17, 2022  - Continues to struggle with alcohol use, recurrent withdrawals  - Noting gabapentin and clonidine helping, though he is using these sparingly, less than 1 per day. Advised he create more of a consistent schedule of using the medications to help treat withdrawal sx  - OK to continue suboxone, as he remains committed to the goal of alcohol abstinence  - Indicates he needs no support to start treatment, and will pursue further options after being discharged for not attending this past group at [facility]      PDMP Review       Value Time User    State PDMP site checked  Yes 3/17/2022 11:23 AM Jesu Arciniega MD            RTC  No follow-ups on file.      Counseled the patient on the importance of having a recovery program in addition to medication to manage recovery.  Components include avoiding isolating, having willingness to change, avoiding triggers and managing cravings. Encouraged having some type of sober network and practicing honesty with trusted support person(s). Encouraged other services such as counseling, 12 step or other self-help organizations.      Opioid warning reviewed.  Risk of overdose following a period of abstinence due to decrease tolerance was discussed including risk of death.  Strongly recommended abstain from alcohol, benzodiazepines, THC, opioids and other drugs of abuse.  Increased risk of return to opioid use after use of these substances discussed.  Increased risk of overdose/death with use of other substances particularly benzodiazepines/alcohol reviewed.        SUBJECTIVE                                                    Justus Cope is a 52 year old male who presents to clinic today for follow up    Visit performed Virtual, via telephone    Time spent on phone call: 20 minutes        PHQ-9 Score:   PHQ 1/17/2022 2/16/2022 3/30/2022   PHQ-9 Total Score 1 0 0   Q9: Thoughts of better off  dead/self-harm past 2 weeks Not at all Not at all Not at all       ROOPA-7 Score:  ROOPA-7 SCORE 10/21/2021 2/16/2022   Total Score 3 (minimal anxiety) 3 (minimal anxiety)   Total Score 3 3           Recent HPI Details:  HPI Oct 21, 2021  - Is signed up with [facility] - Beulah Barboza, ph# [phone] - Steven provides consent for me to talk with her about his medical care  - Decided to change location/program as it is closer to home, and he had missed his intake  - Working more often, 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 Having a hectic day at work  - Denies substance use  - Wants to start at [facility] ASAP  - Mood irritated, stressed but denies any functional concerns  HPI Jan 18, 2022  - He signed up with [facility] for outpatient treatment and plans to start in 1-2 weeks; 2 days weekly  - Agrees to stop alcohol use today  - Mood stable, positive today  - Denies any additional work stressors or life changes  - Denies other substance use or concerns  HPI Feb 2, 2022  - Reports quitting alcohol within the past 2 days  - Shaking with tremors, sweating, and notices his high BP. Denies any CP, SOB, hallucinations or perceptual disturbances, HA, n/v. Notes minimal anxiety above baseline  - Recognizes this is a new withdrawal experience for him; he has not felt symtoms of this nature in the past  - Continues to use suboxone without changes  - Has not yet setup an appt with [facility] to start treatment  HPI Feb 16, 2022  - Reports getting setup with [facility] counseling for outpatient treatment. Will be seeing them twice a week in group, then individually once a week. Schedule is able to be flexible  "around his work schedule  - He is hoping for an in person group as well; the rest of it is virtual  - Reports 2 episodes of alcohol use, Thursday and Tuesday  - Indicates difficulty with coping through stress. Neck pain also increased d/t drinking less alcohol. Has cravings at times, but is able to distract himself for the most part  - Taking gabapentin 300mg BID  HPI Mar 2, 2022  - Initially reporting minimal alcohol use. I discussed that his reports of use at our last visit were not reflected in his PEth testing results (only decreased by 10%, while he reported at least 50% less drinking)  - He agreed with my assessment. Stated he is drinking \"a little less than last time, but only a little\"  - Has been telling his coworkers that he has a stomach ulcer, which is why he won't be able to take shots at work with them  - Has attempted to engage with [facility], and reports he has not received a call back. Is confused about where the group is meeting  HPI Mar 17, 2022   - Wants to continue with treatment; did not realize he was discharged from [facility] but is motivated to enter another treatment center ASAP  - Continues to drink, though trying to stop intermittently  - Using gabapentin/clonidine sparingly  - Reports being unsure about exactly how much he is drinking  - No concerns with suboxone. Pain improved on this         TODAY'S VISIT  HPI Mar 30, 2022  - Reports no alcohol for 1 week!  - Found a local agency to support recovery, [facility]  - Gabapentin and clonidine more consistent, and helpful  - Suboxone working well, no cravings or use      OBJECTIVE                                                    PHYSICAL EXAM:  There were no vitals taken for this visit.    GENERAL: healthy, alert and no distress  RESP: No respiratory distress  MENTAL STATUS EXAM  Appearance/Behavior: No appearant distress  Speech: Normal  Mood/Affect: normal affect  Insight: Adequate    LAB  No results found for any visits " on 03/30/22.      HISTORY                                                    Problem list reviewed & adjusted, as indicated.  Patient Active Problem List   Diagnosis     Chronic neck pain     Alcohol use disorder, mild, abuse     Depression with anxiety     Tobacco dependence syndrome     Uncomplicated opioid dependence (H)         MEDICATION LIST (prior to visit)  nicotine (NICODERM CQ) 14 MG/24HR 24 hr patch, Place 1 patch onto the skin every 24 hours (Patient not taking: Reported on 2/16/2022)  nicotine polacrilex (NICORETTE) 4 MG gum, Place 1 each (4 mg) inside cheek every hour as needed for smoking cessation (Patient not taking: Reported on 2/2/2022)    No current facility-administered medications on file prior to visit.      MEDICATION LIST (after visit)  Current Outpatient Medications   Medication     buprenorphine-naloxone (SUBOXONE) 2-0.5 MG SUBL sublingual tablet     cloNIDine (CATAPRES) 0.1 MG tablet     gabapentin (NEURONTIN) 300 MG capsule     nicotine (NICODERM CQ) 14 MG/24HR 24 hr patch     nicotine polacrilex (NICORETTE) 4 MG gum     No current facility-administered medications for this visit.         No Known Allergies    Additional MDM Details:  none    Jesu Arciniega MD  [facility]  [phone]

## 2022-03-31 ASSESSMENT — PATIENT HEALTH QUESTIONNAIRE - PHQ9: SUM OF ALL RESPONSES TO PHQ QUESTIONS 1-9: 0

## 2022-04-13 ENCOUNTER — TELEPHONE (OUTPATIENT)
Dept: BEHAVIORAL HEALTH | Facility: CLINIC | Age: 52
End: 2022-04-13
Payer: COMMERCIAL

## 2022-04-13 DIAGNOSIS — F11.20 UNCOMPLICATED OPIOID DEPENDENCE (H): ICD-10-CM

## 2022-04-13 RX ORDER — BUPRENORPHINE HYDROCHLORIDE AND NALOXONE HYDROCHLORIDE DIHYDRATE 2; .5 MG/1; MG/1
1 TABLET SUBLINGUAL 2 TIMES DAILY
Qty: 32 TABLET | Refills: 0 | Status: SHIPPED | OUTPATIENT
Start: 2022-04-13 | End: 2022-04-29

## 2022-04-13 NOTE — CONFIDENTIAL NOTE
Refilled suboxone as bridge. Steven attempted to talk during a shift at work and was being interrupted repeatedly. Will call back tomorrow to discuss further.    Jesu Arciniega MD

## 2022-04-13 NOTE — TELEPHONE ENCOUNTER
Reason for call:  Medication     If this is a refill request, has the caller requested the refill from the pharmacy already? N/A (controlled)    Will the patient be using a [facility]? No     Name of the pharmacy and phone number for the current request:   [facility] - [place], [place] - [address]    [phone]  Name of the medication requested: Bupinorphine    Other request: Pt states they are out of their medication today and need a refill until their follow-up appt which is on 4.29.2021. Pt stated that they are available to do a UA tomorrow and that provider can call them today or tomorrow. They had a really long elaborate excuse for why they missed the appt today related to being at work having bronchitis, being sick for a week etc etc.     Phone number to reach patient:  Home number on file [phone] (home)    Best Time:  ANY    Can we leave a detailed message on this number?  YES    Travel screening: Not Applicable

## 2022-04-13 NOTE — CONFIDENTIAL NOTE
Attempted to call COSTA Harris. Will try him later today as well.    Need to discuss with him directly before refill can be sent.    Jesu Arciniega MD

## 2022-04-14 NOTE — CONFIDENTIAL NOTE
4/14/2022    Called twice this morning to connect, LVM. Will try again later.    Jesu Arciniega MD

## 2022-04-29 ENCOUNTER — VIRTUAL VISIT (OUTPATIENT)
Dept: ADDICTION MEDICINE | Facility: CLINIC | Age: 52
End: 2022-04-29
Payer: COMMERCIAL

## 2022-04-29 DIAGNOSIS — F11.20 UNCOMPLICATED OPIOID DEPENDENCE (H): Primary | ICD-10-CM

## 2022-04-29 DIAGNOSIS — F10.20 MODERATE ALCOHOL USE DISORDER (H): ICD-10-CM

## 2022-04-29 DIAGNOSIS — F10.10 ALCOHOL USE DISORDER, MILD, ABUSE: ICD-10-CM

## 2022-04-29 PROCEDURE — 99214 OFFICE O/P EST MOD 30 MIN: CPT | Mod: 95 | Performed by: FAMILY MEDICINE

## 2022-04-29 RX ORDER — GABAPENTIN 300 MG/1
300 CAPSULE ORAL 3 TIMES DAILY PRN
Qty: 60 CAPSULE | Refills: 1 | Status: SHIPPED | OUTPATIENT
Start: 2022-04-29 | End: 2022-07-21

## 2022-04-29 RX ORDER — BUPRENORPHINE HYDROCHLORIDE AND NALOXONE HYDROCHLORIDE DIHYDRATE 2; .5 MG/1; MG/1
1 TABLET SUBLINGUAL 2 TIMES DAILY
Qty: 30 TABLET | Refills: 0 | Status: SHIPPED | OUTPATIENT
Start: 2022-04-29 | End: 2022-07-21

## 2022-04-29 ASSESSMENT — ANXIETY QUESTIONNAIRES
7. FEELING AFRAID AS IF SOMETHING AWFUL MIGHT HAPPEN: NOT AT ALL
7. FEELING AFRAID AS IF SOMETHING AWFUL MIGHT HAPPEN: NOT AT ALL
2. NOT BEING ABLE TO STOP OR CONTROL WORRYING: NOT AT ALL
3. WORRYING TOO MUCH ABOUT DIFFERENT THINGS: NOT AT ALL
1. FEELING NERVOUS, ANXIOUS, OR ON EDGE: NOT AT ALL
GAD7 TOTAL SCORE: 0
6. BECOMING EASILY ANNOYED OR IRRITABLE: NOT AT ALL
GAD7 TOTAL SCORE: 0
GAD7 TOTAL SCORE: 0
4. TROUBLE RELAXING: NOT AT ALL
5. BEING SO RESTLESS THAT IT IS HARD TO SIT STILL: NOT AT ALL

## 2022-04-29 ASSESSMENT — PATIENT HEALTH QUESTIONNAIRE - PHQ9
SUM OF ALL RESPONSES TO PHQ QUESTIONS 1-9: 1
SUM OF ALL RESPONSES TO PHQ QUESTIONS 1-9: 1
10. IF YOU CHECKED OFF ANY PROBLEMS, HOW DIFFICULT HAVE THESE PROBLEMS MADE IT FOR YOU TO DO YOUR WORK, TAKE CARE OF THINGS AT HOME, OR GET ALONG WITH OTHER PEOPLE: NOT DIFFICULT AT ALL

## 2022-04-29 NOTE — PROGRESS NOTES
Steven is a 52 year old who is being evaluated via a billable video visit.      How would you like to obtain your AVS? MyChart  If the video visit is dropped, the invitation should be resent by: Text to cell phone: [phone]  Will anyone else be joining your video visit? No         [facility]    A/P                                                    ASSESSMENT/PLAN  Diagnoses and all orders for this visit:  Uncomplicated opioid dependence (H)  -     Drug Confirmation Panel Urine with Creat - lab collect; Future  -     buprenorphine-naloxone (SUBOXONE) 2-0.5 MG SUBL sublingual tablet; Place 1 tablet under the tongue 2 times daily  Moderate alcohol use disorder (H)  -     Phosphatidylethanol (PEth), Whole Blood; Future  Alcohol use disorder, mild, abuse  -     gabapentin (NEURONTIN) 300 MG capsule; Take 1 capsule (300 mg) by mouth 3 times daily as needed (anxiety, shakiness)    Orders Placed This Encounter   Medications     buprenorphine-naloxone (SUBOXONE) 2-0.5 MG SUBL sublingual tablet     Sig: Place 1 tablet under the tongue 2 times daily     Dispense:  30 tablet     Refill:  0     NADEAN: [id number]; Please substitute for covered alternative per insurance request.     gabapentin (NEURONTIN) 300 MG capsule     Sig: Take 1 capsule (300 mg) by mouth 3 times daily as needed (anxiety, shakiness)     Dispense:  60 capsule     Refill:  1       Problem list updated Apr 29, 2022   Problem   Moderate Alcohol Use Disorder (H)         Apr 29, 2022  - Continue suboxone  - Has nearly been setup with a treatment facility. Will call him weekly to encourage this, as he is having difficulty navigating insurance issues. Awaiting a callback from them  - Lab Monday as above  - Reports abstinence for 2 weeks; will confirm with PeTH testing as above  - Attending AA while awaiting treatment  - Has been using the gabapentin to help with shakiness in AM, and taking it occasionally later in the day. No longer  taking clonidine much d/t more stable BPs  - Continue q2wk appointments while awaiting treatment initiation. Will call next week to setup f/up, after lab results come in. Will see him May 13 or earlier is PeTH results have not decreased by >50%      Last encounter A/P  Mar 30, 2022  - Continue suboxone, no changes  - Attempting to enter programming, facilitated by [facility], near his home  - Attending AA  - Using gabapentin and clonidine more consistently. Reports no alcohol for the past week, and starting to feel withdrawals improve  - Advised it would be safe to stop clonidine if he is noticing less sweating and/or high bp; can be taken PRN for anxiety as well  - No SE noted  - Continue q2wk appt's while working through alcohol abstinence      PDMP Review       Value Time User    State PDMP site checked  Yes 4/29/2022  8:49 AM Jesu Arciniega MD            RTC  No follow-ups on file.      Counseled the patient on the importance of having a recovery program in addition to medication to manage recovery.  Components include avoiding isolating, having willingness to change, avoiding triggers and managing cravings. Encouraged having some type of sober network and practicing honesty with trusted support person(s). Encouraged other services such as counseling, 12 step or other self-help organizations.      Opioid warning reviewed.  Risk of overdose following a period of abstinence due to decrease tolerance was discussed including risk of death.  Strongly recommended abstain from alcohol, benzodiazepines, THC, opioids and other drugs of abuse.  Increased risk of return to opioid use after use of these substances discussed.  Increased risk of overdose/death with use of other substances particularly benzodiazepines/alcohol reviewed.        SUBJECTIVE                                                    Justus Cope is a 52 year old male who presents to clinic today for follow up    Visit performed  Virtual, via video    Video-Visit Details    Type of service:  Video Visit    Video Start Time: 8:32 AM  Video End Time: 8:54 AM    Originating Location (pt. Location): Home    Distant Location (provider location):  [email or web address]     Platform used for Video Visit: Pilar        PHQ-9 Score:   PHQ 2/16/2022 3/30/2022 4/29/2022   PHQ-9 Total Score 0 0 1   Q9: Thoughts of better off dead/self-harm past 2 weeks Not at all Not at all Not at all       ROOPA-7 Score:  ROOPA-7 SCORE 10/21/2021 2/16/2022 4/29/2022   Total Score 3 (minimal anxiety) 3 (minimal anxiety) 0 (minimal anxiety)   Total Score 3 3 0           Recent HPI Details:  HPI Oct 21, 2021  - Is signed up with [facility] - Beulah Barboza, ph# [phone] - Steven provides consent for me to talk with her about his medical care  - Decided to change location/program as it is closer to home, and he had missed his intake  - Working more often, 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 Having a hectic day at work  - Denies substance use  - Wants to start at [facility] ASAP  - Mood irritated, stressed but denies any functional concerns  HPI Jan 18, 2022  - He signed up with [facility] for outpatient treatment and plans to start in 1-2 weeks; 2 days weekly  - Agrees to stop alcohol use today  - Mood stable, positive today  - Denies any additional work stressors or life changes  - Denies other substance use or concerns  HPI Feb 2, 2022  - Reports quitting alcohol within the past 2 days  - Shaking with tremors, sweating, and notices his high BP. Denies any CP, SOB, hallucinations or perceptual disturbances, HA, n/v. Notes minimal anxiety above  "baseline  - Recognizes this is a new withdrawal experience for him; he has not felt symtoms of this nature in the past  - Continues to use suboxone without changes  - Has not yet setup an appt with [facility] to start treatment  HPI Feb 16, 2022  - Reports getting setup with [facility] counseling for outpatient treatment. Will be seeing them twice a week in group, then individually once a week. Schedule is able to be flexible around his work schedule  - He is hoping for an in person group as well; the rest of it is virtual  - Reports 2 episodes of alcohol use, Thursday and Tuesday  - Indicates difficulty with coping through stress. Neck pain also increased d/t drinking less alcohol. Has cravings at times, but is able to distract himself for the most part  - Taking gabapentin 300mg BID  HPI Mar 2, 2022  - Initially reporting minimal alcohol use. I discussed that his reports of use at our last visit were not reflected in his PEth testing results (only decreased by 10%, while he reported at least 50% less drinking)  - He agreed with my assessment. Stated he is drinking \"a little less than last time, but only a little\"  - Has been telling his coworkers that he has a stomach ulcer, which is why he won't be able to take shots at work with them  - Has attempted to engage with [facility], and reports he has not received a call back. Is confused about where the group is meeting  HPI Mar 17, 2022   - Wants to continue with treatment; did not realize he was discharged from [facility] but is motivated to enter another treatment center ASAP  - Continues to drink, though trying to stop intermittently  - Using gabapentin/clonidine sparingly  - Reports being unsure about exactly how much he is drinking  - No concerns with suboxone. Pain improved on this  HPI Mar 30, 2022  - Reports no alcohol for 1 week!  - Found a local agency to support recovery, [facility]  - Gabapentin and clonidine more consistent, and " helpful  - Suboxone working well, no cravings or use       TODAY'S VISIT  HPI Apr 29, 2022  - Reports no alcohol use for 2 weeks!  - Starting to feel more clear-headed  - Back to work after being sick recently; hours have changed some, so he is re-establishing his routine  - Wanting to come Monday for UA to show abstinence  - Has been working with his insurance company to find treatment programs that accept his insurance  - Has been attending AA in the meantime  - Has not been taking clonidine as much, as his BP has been running normal/low  - Using gabapentin in the mornings consistently, and at least 1 per day later in the day. Tries to minimize dose overall; fearful of taking pills often      OBJECTIVE                                                    PHYSICAL EXAM:  There were no vitals taken for this visit.    GENERAL: healthy, alert and no distress  EYES: Eyes grossly normal to inspection, PERRL and conjunctivae and sclerae normal  RESP: No respiratory distress  MENTAL STATUS EXAM  Appearance/Behavior: No appearant distress  Speech: Normal  Mood/Affect: normal affect  Insight: Adequate    LAB  No results found for any visits on 04/29/22.      HISTORY                                                    Problem list reviewed & adjusted, as indicated.  Patient Active Problem List   Diagnosis     Chronic neck pain     Moderate alcohol use disorder (H)     Depression with anxiety     Tobacco dependence syndrome     Uncomplicated opioid dependence (H)         MEDICATION LIST (prior to visit)  cloNIDine (CATAPRES) 0.1 MG tablet, Take 1 tablet (0.1 mg) by mouth 3 times daily as needed (sweating, high bp)    No current facility-administered medications on file prior to visit.      MEDICATION LIST (after visit)  Current Outpatient Medications   Medication     buprenorphine-naloxone (SUBOXONE) 2-0.5 MG SUBL sublingual tablet     cloNIDine (CATAPRES) 0.1 MG tablet     gabapentin (NEURONTIN) 300 MG capsule     No current  facility-administered medications for this visit.         No Known Allergies    Additional MDM Details:  none    Jesu Arciniega MD  [facility]  [phone]      Answers for HPI/ROS submitted by the patient on 4/29/2022  If you checked off any problems, how difficult have these problems made it for you to do your work, take care of things at home, or get along with other people?: Not difficult at all  PHQ9 TOTAL SCORE: 1  ROOPA 7 TOTAL SCORE: 0

## 2022-04-29 NOTE — NURSING NOTE
Patient denies any changes since echeck-in regarding medication and allergies and states all information entered during echeck-in remains accurate.

## 2022-04-30 ASSESSMENT — PATIENT HEALTH QUESTIONNAIRE - PHQ9: SUM OF ALL RESPONSES TO PHQ QUESTIONS 1-9: 1

## 2022-04-30 ASSESSMENT — ANXIETY QUESTIONNAIRES: GAD7 TOTAL SCORE: 0

## 2022-05-05 ENCOUNTER — TELEPHONE (OUTPATIENT)
Dept: ADDICTION MEDICINE | Facility: CLINIC | Age: 52
End: 2022-05-05
Payer: COMMERCIAL

## 2022-05-05 NOTE — TELEPHONE ENCOUNTER
Please call patient and ask about the missed lab appointment this past Monday.    Please ask him to present to a [place] lab for an appointment by tomorrow. He will need to call to schedule directly with the clinic (any  clinic) - for [facility] (7th floor lab) number is [phone]    Also ask for update on his process for starting outpatient treatment.    Jesu Arciniega MD

## 2022-05-05 NOTE — TELEPHONE ENCOUNTER
Phone call made to pt regarding message from Dr. Arciniega. No answer, so LVM for pt to return phone call to clinic. LaThermt message sent as well.

## 2022-05-06 NOTE — TELEPHONE ENCOUNTER
Attempted to connect with pt again regarding having labs done. LVM for pt to please schedule lab appt by end of the day today and to call or MyChart the clinic with questions or concerns.

## 2022-05-09 NOTE — TELEPHONE ENCOUNTER
Attempted to reach pt a third time regarding rescheduling lab appt that he missed last Monday. LVM for pt to please schedule this appt as soon as possible.

## 2022-05-17 ENCOUNTER — TELEPHONE (OUTPATIENT)
Dept: BEHAVIORAL HEALTH | Facility: CLINIC | Age: 52
End: 2022-05-17
Payer: COMMERCIAL

## 2022-05-17 NOTE — TELEPHONE ENCOUNTER
Pt requesting Suboxone refill. Pt has still not presented to lab per Dr. Arciniega as instructed. See notes below. Next appt with Dr. Arciniega not until 6/23/2022. Routing to Provider Pool to determine if refill will be granted or if pt needs to go to Recovery Clinic or at least provide urine sample in lab prior to Suboxone being ordered.

## 2022-05-17 NOTE — TELEPHONE ENCOUNTER
Reason for call:  Medication   If this is a refill request, has the caller requested the refill from the pharmacy already? No  Will the patient be using a [facility]? No  Name of the pharmacy and phone number for the current request: [facility]    Name of the medication requested: Buprenorphine    Other request: Patient has been out for two days. Sent high priority. Patient also would like to do a UA on Thursday.    Phone number to reach patient:  Home number on file [phone] (home)    Best Time:  ASAP    Can we leave a detailed message on this number?  YES    Travel screening: Not Applicable

## 2022-05-17 NOTE — TELEPHONE ENCOUNTER
Recommend patient present to [facility] Clinic or if able to schedule with Dr Arciniega tomorrow (looks like there may be an opening?).  Looks like Dr Arciniega was planning for q 2 week visits due to concerns about ongoing alcohol use.       Marylu Dawn, DO on 5/17/2022 at 4:47 PM

## 2022-05-18 NOTE — TELEPHONE ENCOUNTER
Referral from Addiction Medicine to Recovery Clinic    [facility] team please note - patient can schedule with Jesu Arciniega MD but may not receive refill until seen by Recovery Clinic    Reason for referral?:   - Missing appointments, not providing urine samples. He was previously doing great with attendance, but recently has not been as responsive and has been missing appointments.  - Has been on q2wk appt schedule for most of the time we have worked together d/t ongoing alcohol use  - Likely needs to be seen weekly until he gets into treatment for AUD  - Needs treatment for alcohol use disorder (outpatient ok). Reporting abstinence and AA attendance, though has shown positive urine test results for alcohol consistently when he is claiming ongoing abstinence    What is the main focus for improvement (attendance, decreased use, etc)?:   - Further support to help get setup with alcohol use disorder treatment  - Consistent, weekly attendance until showing abstinence from alcohol    What is preferred timeline/criteria to return to [facility] - Jesu Arciniega MD?  - After he has started treatment and clinic can verify this with treatment provider OR shown consistent abstinence with PeTH testing for 3 consistent visits  - Next appt with Jesu Arciniega MD?: June 23, 2022      - OK for him to keep this appt; I will send him back to  for ongoing stabilization if above criteria have not been met.      Jesu Arciniega MD

## 2022-05-18 NOTE — TELEPHONE ENCOUNTER
"Called pt. No answer, so LVM informing pt that he can go to the [facility] today as a \"walk in\" anytime before 3pm for Suboxone. Also informed pt that Dr. Arciniega currently has a 3pm appt available today if he would like to come in for an appointment. Reiterated that we do need a urine sample from pt.   "

## 2022-07-20 ENCOUNTER — TELEPHONE (OUTPATIENT)
Dept: ADDICTION MEDICINE | Facility: CLINIC | Age: 52
End: 2022-07-20

## 2022-07-20 NOTE — TELEPHONE ENCOUNTER
"Reason for Call:  Medication or medication refill:    Do you use a [facility]?  Name of the pharmacy and phone number for the current request:  [facility] - [place], [place] - [address] [place] [address] [address]    Name of the medication requested: suboxone    Other request: Writer informed by  staff that pt left  requesting refill 7/15. Returned call to pt. Pt states he has been out of suboxone for \" a while..about a week\". Requesting bridge until in person visit with  7/21 @ 8:30.     Can we leave a detailed message on this number? YES    Phone number patient can be reached at: Cell number on file:    Telephone Information:   Mobile [phone]       Best Time: Any    Call taken on 7/20/2022 at 10:11 AM by Salud Britton      "

## 2022-07-20 NOTE — TELEPHONE ENCOUNTER
Reason for Call:  Other appointment    Detailed comments: Informed pt that per provider, pt was to f/u with [facility] as he has not been seen in [facility] for quite some times. PT verbalized understanding but will be unable to attend walk in hours as he works until 6pm today. Confirmed appt with . Provider aware of situation and will meet with pt tmwr but states will need to f/u with [facility].     Call taken on 7/20/2022 at 11:56 AM by Salud Britton

## 2022-07-20 NOTE — TELEPHONE ENCOUNTER
Will discuss with Dr. Arciniega as the last time we discussed this patient Dr. Arciniega stated he needed to be seen at  due to on going missed appts

## 2022-07-21 ENCOUNTER — OFFICE VISIT (OUTPATIENT)
Dept: BEHAVIORAL HEALTH | Facility: CLINIC | Age: 52
End: 2022-07-21
Payer: COMMERCIAL

## 2022-07-21 ENCOUNTER — OFFICE VISIT (OUTPATIENT)
Dept: BEHAVIORAL HEALTH | Facility: CLINIC | Age: 52
End: 2022-07-21

## 2022-07-21 VITALS
DIASTOLIC BLOOD PRESSURE: 93 MMHG | HEART RATE: 109 BPM | HEIGHT: 68 IN | WEIGHT: 93 LBS | BODY MASS INDEX: 14.09 KG/M2 | SYSTOLIC BLOOD PRESSURE: 140 MMHG

## 2022-07-21 DIAGNOSIS — F10.20 MODERATE ALCOHOL USE DISORDER (H): Primary | ICD-10-CM

## 2022-07-21 DIAGNOSIS — F13.10 BENZODIAZEPINE ABUSE (H): ICD-10-CM

## 2022-07-21 DIAGNOSIS — F11.20 UNCOMPLICATED OPIOID DEPENDENCE (H): ICD-10-CM

## 2022-07-21 DIAGNOSIS — F11.20 OPIOID USE DISORDER, SEVERE, DEPENDENCE (H): Primary | ICD-10-CM

## 2022-07-21 PROCEDURE — 80307 DRUG TEST PRSMV CHEM ANLYZR: CPT | Mod: 90 | Performed by: NURSE PRACTITIONER

## 2022-07-21 PROCEDURE — 99000 SPECIMEN HANDLING OFFICE-LAB: CPT | Performed by: NURSE PRACTITIONER

## 2022-07-21 PROCEDURE — 80321 ALCOHOLS BIOMARKERS 1OR 2: CPT | Mod: 90 | Performed by: NURSE PRACTITIONER

## 2022-07-21 PROCEDURE — 99214 OFFICE O/P EST MOD 30 MIN: CPT | Performed by: NURSE PRACTITIONER

## 2022-07-21 PROCEDURE — 99207 PR NO CHARGE LOS: CPT

## 2022-07-21 RX ORDER — BUPRENORPHINE HYDROCHLORIDE AND NALOXONE HYDROCHLORIDE DIHYDRATE 2; .5 MG/1; MG/1
1 TABLET SUBLINGUAL 2 TIMES DAILY
Qty: 28 TABLET | Refills: 0 | Status: SHIPPED | OUTPATIENT
Start: 2022-07-21 | End: 2022-08-04

## 2022-07-21 RX ORDER — GABAPENTIN 300 MG/1
300 CAPSULE ORAL 3 TIMES DAILY PRN
Qty: 60 CAPSULE | Refills: 0 | Status: SHIPPED | OUTPATIENT
Start: 2022-07-21 | End: 2022-08-04

## 2022-07-21 RX ORDER — CLONIDINE HYDROCHLORIDE 0.1 MG/1
0.1 TABLET ORAL 3 TIMES DAILY PRN
Qty: 30 TABLET | Refills: 0 | Status: SHIPPED | OUTPATIENT
Start: 2022-07-21 | End: 2022-08-04

## 2022-07-21 ASSESSMENT — PATIENT HEALTH QUESTIONNAIRE - PHQ9: SUM OF ALL RESPONSES TO PHQ QUESTIONS 1-9: 0

## 2022-07-21 NOTE — PROGRESS NOTES
[facility] Initial Visit    ASSESSMENT/PLAN                                                      1. Moderate alcohol use disorder (H)  - pt reports drinking approx. 3 beers every other day. Counseling provided on risks of drinking while taking Suboxone. Pt verbalizes understanding.   - Discussed goals for alcohol use, pt's goal is not abstinence. He does understand his current alcohol use as problematic. Strongly encouraged outpatient treatment, however pt expresses hesitancy, citing time and work as primary barrier. Will continue to encourage   - Continue Gabapentin 300 mg PO TID, this may be beneficial for the patient long term for AUD.   - Ethyl Glucuronide Screen with Reflex to Confirmation, Urine; Future  - cloNIDine (CATAPRES) 0.1 MG tablet; Take 1 tablet (0.1 mg) by mouth 3 times daily as needed (sweating, high bp)  Dispense: 30 tablet; Refill: 0  - gabapentin (NEURONTIN) 300 MG capsule; Take 1 capsule (300 mg) by mouth 3 times daily as needed (anxiety, shakiness)  Dispense: 60 capsule; Refill: 0    2. Uncomplicated opioid dependence (H)  - Continue Suboxone 2 mg SL  BID. Denies recent opioid use or cravings.   - buprenorphine-naloxone (SUBOXONE) 2-0.5 MG SUBL sublingual tablet; Place 1 tablet under the tongue 2 times daily  Dispense: 28 tablet; Refill: 0  - pt verifies narcan rx at home     3. Benzodiazepine abuse (H)  - Pt reports benzo use once over the past 3 weeks. Strongly discouraged benzo use while he is taking Suboxone, especially in combination with alcohol. Pt verbalizes understanding of risk for respiratory depression and death associated with benzo/alcohol/opiod use.        Return in about 2 weeks (around 8/4/2022) for Follow up, with me, in person.    SUBJECTIVE                                                      CC/HPI:  Justus Cope is a 52 year old male with PMH of neck pain, depression with anxiety, moderate alcohol use disorder, tobacco dependence, and opioid  "dependence  who presents to the Recovery [facility] for initial visit.  Pt was referred by Dr. Shimon Arciniega in Addiction medication.  Pt decided to seek treatment in Recovery Clinic to continue Suboxone.     Pt presents to clinic after being lost to follow up for about 2.5 months. Previously following with Dr. Arciniega in Addiction medicine for ongoing management of opioid dependence and alcohol use disorder moderate. Pt reports he has continue Suboxone despite being \"out\" for 2 months. He has been breaking each tablet into multiple pieces and taking it once per day. He will be a couple days between doses. Reports when he does not take the medication he ha experienced loose stools, body aches, anxiety, and increase neck pain. Denies recent full opioid agonist use. 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 Reports he receives alcohol from his job, samples etc. Reports drinking about 3 beers every other day. His goal is NOT abstinence. His UDS is positive for benzodiazepines today. Report he took a valium \"3 weeks ago\" He has been [previously positive for benzo's in the past. No prescribed this medication.b He has been going to . He does not feel he has time to go to treatment. He \"never knows his schedule\"  \"I don't buy cases of beer\" \"I know my drinking is problematic.\" Has not taken Suboxone today, reports he did take a small amount yesterday.     Substance Use History :  Opioids:   Age at first use: 3/2010  Current use: timing of last use: 2015; substance: prescribed pain meds; route: oral and snort      IV drug use: No   History of overdose: No  Previous treatments : No  Longest period of sobriety: currently   Medical complications related to substance use: denies  Hepatitis C: negative per pt; no recent screening " found   HIV: negative per pt; no recent screening found    Taking buprenorphine? Yes but not as prescribed    Narcan currently available: Yes, could be      Other Addiction History:  Stimulants:   Past use: denies  Use in last 6 months: denies  Sedatives/hypnotics/anxiolytics:   Past use: denies  Use in last 6 months: denies - UDS positive for BZO on 22  Alcohol:   Past use: occasional   Use in last 6 months: 2-4 beers every other day  Nicotine:   Cigarettes: 1 pack daily  Vaping: denies  Chewing tobacco: denies  Cannabis:   Past use: occasionally  Use in last 6 months: denies  Hallucinogens:   Past use: not for over 25 years  Use in last 6 months: denies  Behavioral addictions:   denies;    [place] Prescription Drug Monitoring Program Reviewed:  Yes    Past Medical History:   Diagnosis Date     Chronic neck pain      Opioid use disorder      PAST PSYCHIATRIC HISTORY:  Diagnoses- depression with anxiety   Suicide Attempts: No   Hospitalizations: No     PHQ Score:     PHQ Assesment Total Score(s) 2022   PHQ-9 Score 0   Some recent data might be hidden     If PHQ-9 score of 15 or higher, has Recovery Clinic therapist or provider been notified? N/A    Any current suicidal ideation? No  If yes, has [facility] therapist or provider been notified? N/A    Mental health provider: denies (follow up on MH referral if needed)    Past Surgical History:   Procedure Laterality Date     NO HISTORY OF SURGERY         Medications:  No current outpatient medications on file prior to visit.  No current facility-administered medications on file prior to visit.      No Known Allergies    Family History   Problem Relation Age of Onset     Substance Abuse No family hx of      Social History  Housing status: with girlfriend  Employment status: Employed full time  Relationship status: Partnered  Children: 1 child, 26 YO  Legal: denies  Insurance needs: active  Contact information up to date? yes    3rd Party  "Involvement none today (please obtain ROBBIE if pt would like to include)    REVIEW OF SYSTEMS:  General: No recent fevers.   Eyes:  No vision concerns.  No jaundice.    Resp: No coughing, wheezing or shortness of breath  CV: No chest pains or palpitations  GI: Loose stools.   : No urinary frequency or dysuria, no discharge  Musculoskeletal: Positive for neck pain. No other significant muscle or joint pains.  No edema  Neurologic: No numbness, tingling, weakness, problems with balance or coordination  Psychiatric: No acute concerns other than as above.   Skin: No rashes or areas of acute infection    OBJECTIVE                                                      Clinical Opioid Withdrawal Scale (COWS)  Resting Pulse Rate  2  =  101-120   Sweating    (over past 1/2 hour) 0  =  no report of chills or flushing   Restlessness  0  =  able to sit still   Pupil size  0  =  pupils pinned or normal size for room light   Bone or Joint Aches    (acute only) 1  =  mild diffuse discomfort   Runny nose or tearing    (unrelated to cold/allergies) 0  =  not present   GI Upset    (over past 1/2 hour) 2  =  nausea or loose stool   Tremor    (outstretched hands) 0  =  no tremor   Yawning    (during assessment) 0  =  no yawning   Anxiety/Irritability 2  =  patient obviously irritable or anxious   Gooseflesh skin 0  =  skin is smooth     TOTAL SCORE  Add column for score   7       BP (!) 140/93   Pulse 109   Ht 1.727 m (5' 8\")   Wt 42.2 kg (93 lb)   BMI 14.14 kg/m      Physical Exam  Constitutional:       General: He is not in acute distress.     Appearance: Normal appearance. He is underweight. He is not ill-appearing or diaphoretic.   HENT:      Nose: No rhinorrhea.   Eyes:      General: No scleral icterus.     Extraocular Movements: Extraocular movements intact.      Conjunctiva/sclera: Conjunctivae normal.      Comments: Avoids eye contact    Cardiovascular:      Rate and Rhythm: Tachycardia present.   Pulmonary:      Effort: " Pulmonary effort is normal.   Skin:     Coloration: Skin is not jaundiced.   Neurological:      General: No focal deficit present.      Mental Status: He is alert and oriented to person, place, and time.   Psychiatric:         Mood and Affect: Mood is anxious. Mood is not depressed. Affect is not flat.         Behavior: Behavior is not hyperactive. Behavior is cooperative.         Thought Content: Thought content normal.         Cognition and Memory: Cognition and memory normal.      Comments: speech is rapid but not pressured.   Insight and judgment is fair        Labs:    UDS: positive for buprenorphine and benzodiazepines  *POC urine drug screen does not screen for Fentanyl    No results found for this or any previous visit (from the past 720 hour(s)).    Patient counseling completed today:  Recommend pt keep naloxone in their possession and reviewed other aspects of harm reduction to reduce risk of overdose with return to use.   Recommended avoiding concurrent use of alcohol, benzodiazepines or other sedatives with buprenorphine or other opioids.  Discussed importance of avoiding isolation, building a network of supportive relationships, avoiding people/places/things associated with past use to reduce risk of relapse; including motivational interviewing regarding psychosocial treatment for addiction.     CARLOS Fernandes CNP on 7/21/2022 at 11:21 AM  [facility]  [address] [place] [id number]  [phone]

## 2022-07-21 NOTE — PROGRESS NOTES
" [facility]    Peer  met with Justus RICHEY Anatoliy in the Recovery Clinic to introduce himself, detail services provided and discuss current status of recovery. Pt appeared alert, oriented and open to feedback during our discussion.     Pt arrives in Recovery Clinic for visit with provider for suboxone refill.  Pt states taking suboxone for two years and reports effectiveness in removing urges and cravings.  Pt reports years of polysubstance use while living in [place].  However since moving to [place] pt reports no drug use but admits to consumption of beer - denies hard liquor or wine.      Pt reports his consumption of alcohol is not problematic. Pt reports consuming \"a few beers\" after his workday - stating three or four is usual amount.    Pt reports it is \"not an every night type of thing.\"    Pt reports drinking a few beers helps him relax and unwind on the couch with his girlfriend and his dog while watching television.  Pt reports drinking does not lead to black out or vomiting.     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  Pt states these events can occur during daytime hours but small amounts are consumed as to not affect his job performance. 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  Pt reports alcohol. consumption does not affect his ability to perform work duties in either job.     PRC and pt discussed how as drinkers we may tend to minimize the amount we consume and our dependence upon it.  PRC encouraged pt to consider completion of a CD assessment and provided information for [facility] [id number].     PRC provided business card to pt welcoming " contact for recovery based support and resources.  PRC encouraged pt to connect with Dusty Sorensen [facility] psychotherapist for support and resources.  PRC and pt agree to speak again during an upcoming  visit.           Service Type:     Individual     Session Start Time:        9:30 AM                 Session End Time:          9:45 AM    Session Length:         15 minutes    Patient Goal: To utilize suboxone assisted treatment for sobriety and long term recovery.   Pt will consider scheduling a CD assessment through [facility].    Goal Progress:   Ongoing.    Key Risk Factors to Recovery:   PRC encourages being aware of risk factors that can lead to re-use which include avoiding isolation, avoiding triggers and managing cravings in a healthy manner. being open and willing to acceptance and change on a daily basis.  PRC encourages pt to establish a sober network calling tree to reach out to when needed.  Continue to practice honesty with ourselves and trusted support person(s).   PRC encourages regular attendance at recovery based meetings as well as finding a sponsor for mentoring and accountability.   PRC encourages consideration of other services such as counseling for mental health issues which can correlate with our substance use.      Support Needs:   Ongoing care, support and resources for opioid substance use disorder.     Follow up:   SCARLETT has provided pt with his contact information for support and resource needs.    PRC and pt agree to meet during an upcoming  visit.       [facility]  [address] [place] [address], [address]   [place], [place], [address]  Clinic Phone: [phone]  Clinic Fax: [phone]  Peer  phone: [phone]    Open Monday - Friday  9:00am-4:00pm  Walk in hours: 9am-3pm      Shashank Vásquez  July 21, 2022  10:35 AM    Peer  is supervised by the program medical director.

## 2022-07-22 LAB — ETHYL GLUCURONIDE UR QL SCN: POSITIVE NG/ML

## 2022-07-25 LAB
ETHYL GLUCURONIDE UR CFM-MCNC: NORMAL NG/ML
ETHYL SULFATE UR CFM-MCNC: NORMAL NG/ML

## 2022-08-04 ENCOUNTER — OFFICE VISIT (OUTPATIENT)
Dept: BEHAVIORAL HEALTH | Facility: CLINIC | Age: 52
End: 2022-08-04

## 2022-08-04 ENCOUNTER — OFFICE VISIT (OUTPATIENT)
Dept: BEHAVIORAL HEALTH | Facility: CLINIC | Age: 52
End: 2022-08-04
Payer: COMMERCIAL

## 2022-08-04 ENCOUNTER — LAB (OUTPATIENT)
Dept: LAB | Facility: CLINIC | Age: 52
End: 2022-08-04
Payer: COMMERCIAL

## 2022-08-04 VITALS — HEART RATE: 97 BPM | DIASTOLIC BLOOD PRESSURE: 105 MMHG | SYSTOLIC BLOOD PRESSURE: 162 MMHG

## 2022-08-04 DIAGNOSIS — F13.10 BENZODIAZEPINE ABUSE (H): ICD-10-CM

## 2022-08-04 DIAGNOSIS — F11.20 UNCOMPLICATED OPIOID DEPENDENCE (H): ICD-10-CM

## 2022-08-04 DIAGNOSIS — F10.20 MODERATE ALCOHOL USE DISORDER (H): ICD-10-CM

## 2022-08-04 DIAGNOSIS — F11.20 OPIOID USE DISORDER, SEVERE, DEPENDENCE (H): Primary | ICD-10-CM

## 2022-08-04 DIAGNOSIS — F10.20 MODERATE ALCOHOL USE DISORDER (H): Primary | ICD-10-CM

## 2022-08-04 PROCEDURE — 80321 ALCOHOLS BIOMARKERS 1OR 2: CPT

## 2022-08-04 PROCEDURE — 99214 OFFICE O/P EST MOD 30 MIN: CPT | Performed by: NURSE PRACTITIONER

## 2022-08-04 PROCEDURE — 36415 COLL VENOUS BLD VENIPUNCTURE: CPT

## 2022-08-04 PROCEDURE — 99207 PR NO CHARGE LOS: CPT

## 2022-08-04 RX ORDER — CLONIDINE HYDROCHLORIDE 0.1 MG/1
0.1 TABLET ORAL 3 TIMES DAILY PRN
Qty: 90 TABLET | Refills: 0 | Status: SHIPPED | OUTPATIENT
Start: 2022-08-04 | End: 2023-03-07

## 2022-08-04 RX ORDER — BUPRENORPHINE HYDROCHLORIDE AND NALOXONE HYDROCHLORIDE DIHYDRATE 2; .5 MG/1; MG/1
1 TABLET SUBLINGUAL 2 TIMES DAILY
Qty: 28 TABLET | Refills: 0 | Status: SHIPPED | OUTPATIENT
Start: 2022-08-04 | End: 2022-08-18

## 2022-08-04 RX ORDER — TOPIRAMATE 50 MG/1
TABLET, FILM COATED ORAL
Qty: 30 TABLET | Refills: 0 | Status: SHIPPED | OUTPATIENT
Start: 2022-08-04 | End: 2022-09-06

## 2022-08-04 RX ORDER — GABAPENTIN 300 MG/1
300 CAPSULE ORAL 2 TIMES DAILY
Qty: 60 CAPSULE | Refills: 0 | Status: SHIPPED | OUTPATIENT
Start: 2022-08-04 | End: 2022-09-06

## 2022-08-04 ASSESSMENT — PATIENT HEALTH QUESTIONNAIRE - PHQ9: SUM OF ALL RESPONSES TO PHQ QUESTIONS 1-9: 1

## 2022-08-04 NOTE — PROGRESS NOTES
[facility] Follow Up    ASSESSMENT/PLAN                                                      1. Moderate alcohol use disorder (H)  - pt reports continued alcohol use. Reports drinking 1-2 drinks every other day. He denies cravings. He does report he wants to decrease his alcohol use, it is causing relationship problems.   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 Discussed leaving promptly after work to avoid after work drinks. Discussed coming home from work and utilizing alternative ways to relax after the day other than alcohol.   - Start Topamax, titrate to 50 mg per day. Reviewed common SE. Naltrexone contraindicated d/t buprenorphine treatment. Campral is being avoided d/t pt's continued drinking, his goal is not abstinence.    - Reviewed risk for respiratory depression and death when combining multiple sedating substances. Pt verbalizes understanding.   - Continue Gabapentin, recommended regular dosing versus PRN. Start with Gabapentin 300 mg PO BID.   - gabapentin (NEURONTIN) 300 MG capsule; Take 1 capsule (300 mg) by mouth 2 times daily  Dispense: 60 capsule; Refill: 0  - cloNIDine (CATAPRES) 0.1 MG tablet; Take 1 tablet (0.1 mg) by mouth 3 times daily as needed (anxiety, high bp)  Dispense: 90 tablet; Refill: 0  - topiramate (TOPAMAX) 50 MG tablet; Take 1/2 tablet by mouth at bedtime for 1 week, then increase to 1 tablet by mouth once daily  Dispense: 30 tablet; Refill: 0  - PEth blood test today - order placed on 4/29/22 by Dr. Arciniega, pt to stop in outpatient lab following visit.     2. Uncomplicated opioid dependence (H)  - Controlled. Continue Suboxone 2 mg SL BID.   - buprenorphine-naloxone (SUBOXONE) 2-0.5 MG SUBL sublingual tablet; Place 1 tablet under the tongue 2 times daily  Dispense: 28 tablet; Refill: 0    3. Benzodiazepine abuse (H)  - UDS positive today. Pt denies recent use since last visit (2 weeks ago). Will send for  "confirmation testing at follow up visit if still positive for BZO. Confirmation testing has been negative in the past.        Return in about 2 weeks (around 8/18/2022) for Follow up, with me, in person.    SUBJECTIVE                                                        CC/HPI:  Justus Cope is a 52 year old male with PMH neck pain, depression with anxiety, moderate alcohol use disorder, tobacco dependence, and opioid dependence  who presents to the Recovery Clinic for follow up. Pt was referred by Dr. Shimon Arciniega in Addiction medication.     First Recovery Clinic visit:  7/21/22    Brief history of use:   Previously following with Dr. Arciniega in Addiction medicine for ongoing management of opioid dependence and alcohol use disorder moderate. At initial visit pt reports he has continue Suboxone despite being \"out\" for 2 months. He had been breaking each tablet into multiple pieces and taking it once per day. He will be a couple days between doses. Denies recent full opioid agonist use. Reports he was taking percocet when he was living in [place]. 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 Reports drinking about 3 beers every other day. His goal is NOT abstinence. He has been previously positive for benzo's in the past. No prescribed this medication. He has been going to . He does not feel he has time to go to treatment. He \"never knows his schedule\"  \"I don't buy cases of beer\" \"I know my drinking is problematic.\"     Substance Use History :  Opioids:   Age at first use: [birth year]2010  Current use: timing of last use: 2015; substance: prescribed pain meds; route: oral and snort                 IV drug use: No   History of overdose: No  Previous treatments : No  Longest period of sobriety: currently   Medical complications related to substance use: denies  Hepatitis C: negative per pt; no recent screening found   HIV: negative per " pt; no recent screening found     Other Addiction History:  Stimulants:   Past use: denies  Use in last 6 months: denies  Sedatives/hypnotics/anxiolytics:   Past use: denies  Use in last 6 months: denies - UDS positive for BZO on 7/21/22  Alcohol:   Past use: occasional   Use in last 6 months: 2-4 beers every other day  Nicotine:   Cigarettes: 1 pack daily  Vaping: denies  Chewing tobacco: denies  Cannabis:   Past use: occasionally  Use in last 6 months: denies  Hallucinogens:   Past use: not for over 25 years  Use in last 6 months: denies  Behavioral addictions:   denies;    Most recent Recovery Clinic visit 7/21/222  A/P from that visit:   1. Moderate alcohol use disorder (H)  - pt reports drinking approx. 3 beers every other day. Counseling provided on risks of drinking while taking Suboxone. Pt verbalizes understanding.   - Discussed goals for alcohol use, pt's goal is not abstinence. He does understand his current alcohol use as problematic. Strongly encouraged outpatient treatment, however pt expresses hesitancy, citing time and work as primary barrier. Will continue to encourage   - Continue Gabapentin 300 mg PO TID, this may be beneficial for the patient long term for AUD.   - Ethyl Glucuronide Screen with Reflex to Confirmation, Urine; Future  - cloNIDine (CATAPRES) 0.1 MG tablet; Take 1 tablet (0.1 mg) by mouth 3 times daily as needed (sweating, high bp)  Dispense: 30 tablet; Refill: 0  - gabapentin (NEURONTIN) 300 MG capsule; Take 1 capsule (300 mg) by mouth 3 times daily as needed (anxiety, shakiness)  Dispense: 60 capsule; Refill: 0     2. Uncomplicated opioid dependence (H)  - Continue Suboxone 2 mg SL  BID. Denies recent opioid use or cravings.   - buprenorphine-naloxone (SUBOXONE) 2-0.5 MG SUBL sublingual tablet; Place 1 tablet under the tongue 2 times daily  Dispense: 28 tablet; Refill: 0  - pt verifies narcan rx at home      3. Benzodiazepine abuse (H)  - Pt reports benzo use once over the past 3  "weeks. Strongly discouraged benzo use while he is taking Suboxone, especially in combination with alcohol. Pt verbalizes understanding of risk for respiratory depression and death associated with benzo/alcohol/opiod use.             Return in about 2 weeks (around 8/4/2022) for Follow up, with me, in person.    Today, pt states :   - Pt  Returns for 2 week follow up today. Reports he has been taking Suboxone as prescribed. Taking 2 mg twice daily. Denies adverse SE.   - Reports he has decreased his alcohol use. He endorses alcohol causing relationship problems with his fiance. 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 They have gatherings after work where the manager will give every samples. He has had a hard time saying no to this in the past. Tells me \"my drinking is not problematic\" Reports he has tried leaving work 15 minutes early to avoid these gatherings. Discussed ways to say no to alcohol samples from vendors while at work. He is having a beer when he comes home from work to \"unwind\" Discussed alternative ways he can relax at the end of the day. His fiance does not want him to drink, reports \"she hate its when I come home and she can smell the liquor on me\"   - pain is well controlled with Suboxone.   - sleeping well. Mood is stable, reports his mood has been \"fine\" \"I am a pretty mellow radhika\"   - He continues work as PCA and [employer]   - He is engaged to his fiance Anel. They are worried if they get  she will lose her disability insurance. She was in a severe car accident. They have been together for many years.   - denies recent benzo use since his last visit 2 weeks ago.       [place] Prescription Drug Monitoring Program Reviewed:  Yes  07/21/2022  2   07/21/2022  Buprenorphine-Nalox 2-0.5mg Tb    28.00  14     07/21/2022  2   07/21/2022  Gabapentin 300 MG Capsule    60.00  20     Medications:  No current outpatient medications on file prior to visit.  No current " facility-administered medications on file prior to visit.      No Known Allergies    PMH, PSH, FamHx reviewed  PAST PSYCHIATRIC HISTORY:  Diagnoses- depression with anxiety   Suicide Attempts: No   Hospitalizations: No     Social History  Housing status: with girlfriend  Employment status: Employed full time  Relationship status: Partnered  Children: 1 child, 26 YO  Legal: denies  Insurance needs: active  Contact information up to date? yes    Review Of Systems  Skin: negative for, rash, itching, bruising  Respiratory: No shortness of breath, dyspnea on exertion, cough  Cardiovascular: negative for, chest pain and lower extremity edema  Gastrointestinal: negative and constipation  Musculoskeletal: positive for neck pain, baseline   Neurologic: negative for, headaches and syncope  Psychiatric: negative    OBJECTIVE                                                      BP (!) 162/105   Pulse 97     Physical Exam  Constitutional:       General: He is awake. He is not in acute distress.     Appearance: Normal appearance.   Eyes:      General: No scleral icterus.     Extraocular Movements: Extraocular movements intact.      Conjunctiva/sclera: Conjunctivae normal.   Cardiovascular:      Rate and Rhythm: Normal rate.   Pulmonary:      Effort: Pulmonary effort is normal.   Skin:     Coloration: Skin is not jaundiced.   Neurological:      General: No focal deficit present.      Mental Status: He is alert and oriented to person, place, and time.   Psychiatric:         Attention and Perception: Attention and perception normal.         Mood and Affect: Mood is anxious. Mood is not depressed. Affect is not flat.         Speech: Speech normal. Speech is not rapid and pressured.         Behavior: Behavior is cooperative.         Thought Content: Thought content normal.         Cognition and Memory: Cognition and memory normal.      Comments: Insight and judgment fair        Labs:  UDS: 8/4/22 positive for buprenorphine and  benzodiazepines - see HPI   *POC urine drug screen does not screen for Fentanyl    No results found for this or any previous visit (from the past 240 hour(s)).      Patient counseling completed today:Recommend pt keep naloxone in their possession and reviewed other aspects of harm reduction to reduce risk of overdose with return to use.   Recommended avoiding concurrent use of alcohol, benzodiazepines or other sedatives with buprenorphine or other opioids.  Discussed importance of avoiding isolation, building a network of supportive relationships, avoiding people/places/things associated with past use to reduce risk of relapse; including motivational interviewing regarding psychosocial treatment for addiction.     CARLOS Fernandes [facility]  [address] [place] [address] [id number] [phone]

## 2022-08-04 NOTE — NURSING NOTE
[facility]      Rooming information:  Approximate last use of illicit opioids: years ago   Taking buprenorphine? Yes:  As prescribed? Yes:   Number of buprenorphine films/tablets remaining currently: out (0)  Side effects related to buprenorphine (constipation, dry mouth, sedation?) No     Narcan currently available: Yes  Other recent substance use:    Alcohol   NICOTINE-Yes: smokes 1 pack per day  If using nicotine, ready to quit? Yes: does not need replacement today    Point of care urine drug screen positive for: buprenorphine and benzodiazepines  *POC urine drug screen does not screen for Fentanyl      PHQ Assesment Total Score(s) 8/4/2022   PHQ-9 Score 1   Some recent data might be hidden       If PHQ-9 score of 15 or higher, has Recovery Clinic therapist or provider been notified? N/A    Any current suicidal ideation? No  If yes, has Recovery Clinic therapist or provider been notified? N/A    Primary care provider: Physician No Ref-Primary     Mental health provider: no (follow up on MH referral if needed)    Insurance needs: Acitve    Housing needs: Stable    Contact information up to date? Yes    3[employer]olvement  nne today (please obtain ROBBIE if pt would like to include)    Deann Flores RN  August 4, 2022  9:05 AM

## 2022-08-04 NOTE — PROGRESS NOTES
[facility]    Peer  met with Justus Cope in the Recovery Clinic to introduce himself, detail services provided and discuss current status of recovery. Pt appeared alert, oriented and open to feedback during our discussion.     Pt arrives fr visit with  provider for suboxone refill.  Pt reports taking suboxone as prescribed by the provider. Pt reports no changes in the consumption of three to four beers per evening.  PRC did not have the opportunity time-wise to discuss consideration of scheduling an assessment together in the .     PRC welcomes contact for recovery based support and resources. PRC and pt agree to speak again during an upcoming  visit.           Service Type:     Individual     Session Start Time:         9:00 AM                Session End Time:           9:30 AM    Session Length:         15 minutes    Patient Goal: To utilize suboxone assisted treatment for sobriety and long term recovery.     Goal Progress:   Ongoing.    Key Risk Factors to Recovery:   PRC encourages being aware of risk factors that can lead to re-use which include avoiding isolation, avoiding triggers and managing cravings in a healthy manner. being open and willing to acceptance and change on a daily basis.  PRC encourages pt to establish a sober network calling tree to reach out to when needed.  Continue to practice honesty with ourselves and trusted support person(s).   PRC encourages regular attendance at recovery based meetings as well as finding a sponsor for mentoring and accountability.   PRC encourages consideration of other services such as counseling for mental health issues which can correlate with our substance use.      Support Needs:   Ongoing care, support and resources for opioid substance use disorder.     Follow up:   PRC has provided pt with his contact information for support and resource needs.    PRC and pt agree to meet during an upcoming  visit.         [facility]  [address], [address]   [place], [place], [address]  Clinic Phone: [phone]  Clinic Fax: [phone]  Peer  phone: [phone]    Open Monday - Friday  9:00am-4:00pm  Walk in hours: 9am-3pm      Shashank Vásquez  August 4, 2022  10:26 AM    Doris Taveras MA, [facility] provides clinical  oversite and supervision of care.

## 2022-08-06 LAB
PLPETH BLD-MCNC: 1130 NG/ML
POPETH BLD-MCNC: 1878 NG/ML

## 2022-08-16 ENCOUNTER — TELEPHONE (OUTPATIENT)
Dept: BEHAVIORAL HEALTH | Facility: CLINIC | Age: 52
End: 2022-08-16

## 2022-08-16 NOTE — TELEPHONE ENCOUNTER
Reason for Call:  Other appointment    Detailed comments: Confirmed in person visit with Emily Trujillo 8/18 @ 9 AM.    Call taken on 8/16/2022 at 1:38 PM by Salud Britton

## 2022-08-18 ENCOUNTER — OFFICE VISIT (OUTPATIENT)
Dept: BEHAVIORAL HEALTH | Facility: CLINIC | Age: 52
End: 2022-08-18
Payer: COMMERCIAL

## 2022-08-18 VITALS — SYSTOLIC BLOOD PRESSURE: 153 MMHG | HEART RATE: 112 BPM | DIASTOLIC BLOOD PRESSURE: 92 MMHG

## 2022-08-18 DIAGNOSIS — F13.10 BENZODIAZEPINE ABUSE (H): ICD-10-CM

## 2022-08-18 DIAGNOSIS — F10.20 MODERATE ALCOHOL USE DISORDER (H): Primary | ICD-10-CM

## 2022-08-18 DIAGNOSIS — F11.20 UNCOMPLICATED OPIOID DEPENDENCE (H): ICD-10-CM

## 2022-08-18 PROCEDURE — 99214 OFFICE O/P EST MOD 30 MIN: CPT | Performed by: NURSE PRACTITIONER

## 2022-08-18 RX ORDER — BUPRENORPHINE HYDROCHLORIDE AND NALOXONE HYDROCHLORIDE DIHYDRATE 2; .5 MG/1; MG/1
1 TABLET SUBLINGUAL 2 TIMES DAILY
Qty: 28 TABLET | Refills: 0 | Status: SHIPPED | OUTPATIENT
Start: 2022-08-18 | End: 2022-09-06

## 2022-08-18 ASSESSMENT — PATIENT HEALTH QUESTIONNAIRE - PHQ9: SUM OF ALL RESPONSES TO PHQ QUESTIONS 1-9: 0

## 2022-08-18 NOTE — NURSING NOTE
[facility]      Rooming information:  Approximate last use of illicit opioids: 2015  Taking buprenorphine? Yes:  As prescribed? Yes:   Number of buprenorphine films/tablets remaining currently: 0  Side effects related to buprenorphine (constipation, dry mouth, sedation?) No   Narcan currently available: Yes  Other recent substance use:    Alcohol   NICOTINE-Yes:   If using nicotine, ready to quit? Yes:     Point of care urine drug screen positive for: buprenorphine  *POC urine drug screen does not screen for Fentanyl      PHQ Assesment Total Score(s) 8/18/2022   PHQ-9 Score 0   Some recent data might be hidden       If PHQ-9 score of 15 or higher, has Recovery Clinic therapist or provider been notified? N/A    Any current suicidal ideation? No  If yes, has Recovery Clinic therapist or provider been notified? N/A    Primary care provider: Physician No Ref-Primary     Mental health provider: denies (follow up on MH referral if needed)    Insurance needs: active    Housing needs: stable    Contact information up to date? yes    3rd Party Involvement none today (please obtain ROBBIE if pt would like to include)    Jenny Yen CMA  August 18, 2022  9:17 AM

## 2022-08-18 NOTE — PROGRESS NOTES
[facility] Recovery [facility] Follow Up    ASSESSMENT/PLAN                                                      1. Moderate alcohol use disorder (H)  - pt reports continues alcohol use, reviewed results of peth testing from last visit and pt admits to more alcohol use than previously stated. 6-7 beers 3 x per week, 3 beers 4 x per week. He does want to decrease or stop drinking.   - Strongly recommended CD eval and following recommendation of . Phone number for Behavioral Access Provided today. MI utilized to discuss pt current perceptions of risks and benefits of ongoing alcohol use.   - Start Topamax, titrate to 50 mg per day.  - Continue Gabapentin 300 mg PO BID, can increase to TID if needed. Continue Clonidine 0.1 mg PO TID PRN.   - Discussed importance of avoiding isolation, building a network of supportive relationships, avoiding people/places/things associated with past use to reduce risk of relapse  - reviewed risk of combining opioids and alcohol, risk for respiratory depression, confusion, change in LOC and death    2. Uncomplicated opioid dependence (H)  Controlled. Continue Suboxone 2 mg SL BID.   - pt verifies narcan Rx at home   - buprenorphine-naloxone (SUBOXONE) 2-0.5 MG SUBL sublingual tablet; Place 1 tablet under the tongue 2 times daily  Dispense: 28 tablet; Refill: 0    3. Benzodiazepine abuse (H)  No recent use or cravings.     Return in about 2 weeks (around 9/1/2022) for Follow up, with me, in person.    SUBJECTIVE                                                        CC/HPI:  Justus Cope is a 52 year old male with PMH neck pain, depression with anxiety, moderate alcohol use disorder, tobacco dependence, and opioid dependence  who presents to the Recovery Clinic for follow up. Pt was referred by Dr. Shimon Arciniega in Addiction medication.      First Recovery Clinic visit:  7/21/22     Brief history of use:   Previously following with Dr. Arciniega in Addiction medicine for  "ongoing management of opioid dependence and alcohol use disorder moderate. At initial visit pt reports he has continue Suboxone despite being \"out\" for 2 months. He had been breaking each tablet into multiple pieces and taking it once per day. He will be a couple days between doses. Denies recent full opioid agonist use. Reports he was taking percocet when he was living in [place]. He is currently working full time at 2 different jobs, [employer] and as a PCA. Reports he receives alcohol from his job. Reports drinking about 3 beers every other day. His goal is NOT abstinence. He has been previously positive for benzo's in the past. No prescribed this medication. He has been going to . He does not feel he has time to go to treatment. He \"never knows his schedule\"  \"I don't buy cases of beer\" \"I know my drinking is problematic.\"      Substance Use History :  Opioids:   Age at first use: 3/2010  Current use: timing of last use: 2015; substance: prescribed pain meds; route: oral and snort                 IV drug use: No   History of overdose: No  Previous treatments : No  Longest period of sobriety: currently   Medical complications related to substance use: denies  Hepatitis C: negative per pt; no recent screening found   HIV: negative per pt; no recent screening found     Other Addiction History:  Stimulants:   Past use: denies  Use in last 6 months: denies  Sedatives/hypnotics/anxiolytics:   Past use: denies  Use in last 6 months: denies - UDS positive for BZO on 7/21/22  Alcohol:   Past use: occasional   Use in last 6 months: 2-4 beers every other day  Nicotine:   Cigarettes: 1 pack daily  Vaping: denies  Chewing tobacco: denies  Cannabis:   Past use: occasionally  Use in last 6 months: denies  Hallucinogens:   Past use: not for over 25 years  Use in last 6 months: denies  Behavioral addictions:   denies;      Most recent Recovery Clinic visit 8/4/22  A/P from that visit:     1. Moderate alcohol use disorder " (H)  - pt reports continued alcohol use. Reports drinking 1-2 drinks every other day. He denies cravings. He does report he wants to decrease his alcohol use, it is causing relationship problems.   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 Discussed leaving promptly after work to avoid after work drinks. Discussed coming home from work and utilizing alternative ways to relax after the day other than alcohol.   - Start Topamax, titrate to 50 mg per day. Reviewed common SE. Naltrexone contraindicated d/t buprenorphine treatment. Campral is being avoided d/t pt's continued drinking, his goal is not abstinence.    - Reviewed risk for respiratory depression and death when combining multiple sedating substances. Pt verbalizes understanding.   - Continue Gabapentin, recommended regular dosing versus PRN. Start with Gabapentin 300 mg PO BID.   - gabapentin (NEURONTIN) 300 MG capsule; Take 1 capsule (300 mg) by mouth 2 times daily  Dispense: 60 capsule; Refill: 0  - cloNIDine (CATAPRES) 0.1 MG tablet; Take 1 tablet (0.1 mg) by mouth 3 times daily as needed (anxiety, high bp)  Dispense: 90 tablet; Refill: 0  - topiramate (TOPAMAX) 50 MG tablet; Take 1/2 tablet by mouth at bedtime for 1 week, then increase to 1 tablet by mouth once daily  Dispense: 30 tablet; Refill: 0  - PEth blood test today - order placed on 4/29/22 by Dr. Arciniega, pt to stop in outpatient lab following visit.      2. Uncomplicated opioid dependence (H)  - Controlled. Continue Suboxone 2 mg SL BID.   - buprenorphine-naloxone (SUBOXONE) 2-0.5 MG SUBL sublingual tablet; Place 1 tablet under the tongue 2 times daily  Dispense: 28 tablet; Refill: 0     3. Benzodiazepine abuse (H)  - UDS positive today. Pt denies recent use since last visit (2 weeks ago). Will send for confirmation testing at follow up visit if still positive for BZO. Confirmation testing has been negative in the past.                 Return in about 2 weeks  "(around 8/18/2022) for Follow up, with me, in person.       Today, pt states:   - He reports daily drinking today, at least 3 beers per day. But does have days when he drinks 6-7 drinks per day. He is drinking 6-7 beers per day at least 3 days per week. He has gotten \"shaky and sweaty\" in the past when he stops drinking. No history of alcohol withdrawal seizures or DT's.   - He is open to starting an outpatient treatment.    - He judges his \"habit\" by how much money he spends on alcohol.   - He has been able to cut out drinking after work   - Reports taking Suboxone as prescribed. No adverse SE noted.   - He denies recent benzo use.   - Reports he did not  Topamax at pharmacy after last visit. He will pick that up today   - He is taking Gabapentin 300 mg up to BID but does take this daily. Reports he takes gabapentin and clonidine when he stops drinking, that combination of medications work well to control his symptoms.   - lives in [place], [place] and would like to go to treatment near by       [place] Prescription Drug Monitoring Program Reviewed:  Yes  08/04/2022  2   08/04/2022  Buprenorphine-Nalox 2-0.5mg Tb    28.00  14       Medications:  cloNIDine (CATAPRES) 0.1 MG tablet, Take 1 tablet (0.1 mg) by mouth 3 times daily as needed (anxiety, high bp)  gabapentin (NEURONTIN) 300 MG capsule, Take 1 capsule (300 mg) by mouth 2 times daily  topiramate (TOPAMAX) 50 MG tablet, Take 1/2 tablet by mouth at bedtime for 1 week, then increase to 1 tablet by mouth once daily    No current facility-administered medications on file prior to visit.      No Known Allergies    PMH, PSH, FamHx reviewed  PAST PSYCHIATRIC HISTORY:  Diagnoses- depression with anxiety   Suicide Attempts: No   Hospitalizations: No      Social History  Housing status: with girlfriend  Employment status: Employed full time  Relationship status: Partnered  Children: 1 child, 26 YO  Legal: denies  Insurance needs: active  Contact information up " to date? yes    OBJECTIVE                                                      BP (!) 153/92   Pulse 112     Physical Exam  Constitutional:       General: He is not in acute distress.     Appearance: He is not ill-appearing or diaphoretic.   Eyes:      General: No scleral icterus.     Extraocular Movements: Extraocular movements intact.   Pulmonary:      Effort: Pulmonary effort is normal.   Neurological:      General: No focal deficit present.      Mental Status: He is alert and oriented to person, place, and time.   Psychiatric:         Attention and Perception: Attention and perception normal.         Mood and Affect: Mood is anxious. Mood is not depressed. Affect is not flat.         Speech: Speech normal.         Behavior: Behavior is cooperative.         Thought Content: Thought content normal.         Cognition and Memory: Cognition and memory normal.      Comments: Insight and judgment fair          Labs:    UDS: 8/18/ positive for  buprenorphine   8/4/22 positive for buprenorphine and benzodiazepines   *POC urine drug screen does not screen for Fentanyl    No results found for this or any previous visit (from the past 240 hour(s)).      Patient counseling completed today:  Discussed mechanism of action, potential risks/benefits/side effects of medications and other recommendations above.  Recommend pt keep naloxone in their possession and reviewed other aspects of harm reduction to reduce risk of overdose with return to use.      CARLOS Fernandes CNP  [facility]  [address] [place] [id number] [phone]

## 2022-09-06 ENCOUNTER — OFFICE VISIT (OUTPATIENT)
Dept: BEHAVIORAL HEALTH | Facility: CLINIC | Age: 52
End: 2022-09-06

## 2022-09-06 ENCOUNTER — OFFICE VISIT (OUTPATIENT)
Dept: BEHAVIORAL HEALTH | Facility: CLINIC | Age: 52
End: 2022-09-06
Payer: COMMERCIAL

## 2022-09-06 ENCOUNTER — LAB (OUTPATIENT)
Dept: LAB | Facility: CLINIC | Age: 52
End: 2022-09-06
Payer: COMMERCIAL

## 2022-09-06 VITALS — DIASTOLIC BLOOD PRESSURE: 79 MMHG | SYSTOLIC BLOOD PRESSURE: 130 MMHG | HEART RATE: 96 BPM

## 2022-09-06 DIAGNOSIS — F17.200 NICOTINE USE DISORDER: ICD-10-CM

## 2022-09-06 DIAGNOSIS — F11.20 OPIOID USE DISORDER, SEVERE, DEPENDENCE (H): Primary | ICD-10-CM

## 2022-09-06 DIAGNOSIS — F10.20 MODERATE ALCOHOL USE DISORDER (H): ICD-10-CM

## 2022-09-06 DIAGNOSIS — F10.20 MODERATE ALCOHOL USE DISORDER (H): Primary | ICD-10-CM

## 2022-09-06 DIAGNOSIS — F13.10 BENZODIAZEPINE ABUSE (H): ICD-10-CM

## 2022-09-06 DIAGNOSIS — F11.20 UNCOMPLICATED OPIOID DEPENDENCE (H): ICD-10-CM

## 2022-09-06 PROCEDURE — 36415 COLL VENOUS BLD VENIPUNCTURE: CPT

## 2022-09-06 PROCEDURE — 99214 OFFICE O/P EST MOD 30 MIN: CPT | Performed by: NURSE PRACTITIONER

## 2022-09-06 PROCEDURE — 80321 ALCOHOLS BIOMARKERS 1OR 2: CPT

## 2022-09-06 PROCEDURE — 99207 PR NO CHARGE LOS: CPT

## 2022-09-06 RX ORDER — GABAPENTIN 300 MG/1
300 CAPSULE ORAL 2 TIMES DAILY
Qty: 60 CAPSULE | Refills: 0 | Status: SHIPPED | OUTPATIENT
Start: 2022-09-06 | End: 2022-09-30

## 2022-09-06 RX ORDER — BUPRENORPHINE HYDROCHLORIDE AND NALOXONE HYDROCHLORIDE DIHYDRATE 2; .5 MG/1; MG/1
1 TABLET SUBLINGUAL 2 TIMES DAILY
Qty: 30 TABLET | Refills: 0 | Status: SHIPPED | OUTPATIENT
Start: 2022-09-06 | End: 2022-09-29

## 2022-09-06 RX ORDER — TOPIRAMATE 50 MG/1
TABLET, FILM COATED ORAL
Qty: 30 TABLET | Refills: 0 | Status: SHIPPED | OUTPATIENT
Start: 2022-09-06 | End: 2023-02-21

## 2022-09-06 ASSESSMENT — PATIENT HEALTH QUESTIONNAIRE - PHQ9: SUM OF ALL RESPONSES TO PHQ QUESTIONS 1-9: 0

## 2022-09-06 NOTE — NURSING NOTE
[facility]      Rooming information:  Approximate last use of illicit opioids: 2015  Taking buprenorphine? Yes:  As prescribed? Yes:   Number of buprenorphine films/tablets remaining currently: 0  Side effects related to buprenorphine (constipation, dry mouth, sedation?) No   Narcan currently available: Yes  Other recent substance use:    Alcohol   NICOTINE-Yes  If using nicotine, ready to quit? Yes:     Point of care urine drug screen positive for: buprenorphine  *POC urine drug screen does not screen for Fentanyl      PHQ Assesment Total Score(s) 9/6/2022   PHQ-9 Score 0   Some recent data might be hidden       If PHQ-9 score of 15 or higher, has Recovery Clinic therapist or provider been notified? N/A    Any current suicidal ideation? No  If yes, has Recovery Clinic therapist or provider been notified? N/A    Primary care provider: Physician No Ref-Primary     Mental health provider: denies (follow up on MH referral if needed)    Insurance needs: active    Housing needs: stable    Contact information up to date? yes    3rd Party Involvement none today (please obtain ROBBIE if pt would like to include)    Jenny Yen CMA  September 6, 2022  9:39 AM

## 2022-09-06 NOTE — PROGRESS NOTES
[facility]    Peer  met with Justus Cope in the Recovery Clinic to introduce himself, detail services provided and discuss current status of recovery. Pt appeared alert, oriented and open to feedback during our discussion.     Pt arrives in Recovery Clinic for visit with provider for suboxone refill.  Pt states taking suboxone for two years and reports effectiveness in removing urges and cravings.  Pt reports recovery has been going well.     PRC and pt discussed years of previous polysubstance use while living in [place] but upon moving to [place] reports no drug use.  Pt admits to consumption of beer - denies hard liquor or wine.  Pt reports drinking a few (three to four) beers each night helps him relax after the work day. Pt reports drinking is under control and not a problem in his daily life.  Pt does not feel an assessment for inpatient or IOP is necessary.  PRC encouraged doing so if the need should arise.      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 Pt also works at [employer] within walking distance of his apartment in [place].      PRC welcomes contact for recovery based support and resources.  PRC encouraged pt to connect with Dusty Sorensen -  psychotherapist for support and resources.  PRC and pt agree to speak again during an upcoming  visit.            Service Type:     Individual     Session Start Time:   9:00 AM                      Session End Time:     9:15 AM    Session Length:      15 minutes       Patient Goal: To utilize suboxone assisted treatment for sobriety and long term recovery.     Goal Progress:   Ongoing.    Key Risk Factors to Recovery:   PRC encourages being aware of risk factors that can lead to re-use which include avoiding isolation, avoiding triggers and managing cravings in a healthy manner. being open and willing to acceptance and change  on a daily basis.  PRC encourages pt to establish a sober network calling tree to reach out to when needed.  Continue to practice honesty with ourselves and trusted support person(s).   PRC encourages regular attendance at recovery based meetings as well as finding a sponsor for mentoring and accountability.   PRC encourages consideration of other services such as counseling for mental health issues which can correlate with our substance use.      Support Needs:   Ongoing care, support and resources for opioid substance use disorder.     Follow up:   SCARLETT has provided pt with his contact information for support and resource needs.    PRC and pt agree to meet during an upcoming  visit.       [facility]  [address], [address]   [place], [place], [address]  Clinic Phone: [phone]  Clinic Fax: [phone]  Peer  phone: [phone]    Open Monday - Friday  9:00am-4:00pm  Walk in hours: 9am-3pm      Shashank Vásquez  September 6, 2022  12:05 PM    Doris Taveras MA, [facility] provides clinical  oversite and supervision of care.

## 2022-09-06 NOTE — PROGRESS NOTES
[facility] Follow Up    ASSESSMENT/PLAN                                                      1. Moderate alcohol use disorder (H)  - reports continued alcohol use. Reiterates motivation to stop drinking. He has not completed CD eval, requesting phone number to call and schedule this. Open to IOP/outpatient program.   - PEth today   - Reviewed recommendation to start Topamax.   - Continue Gabapentin 300 mg PO BID, can increase to TID if needed. Continue Clonidine 0.1 mg PO TID PRN.   - reviewed risk of combining opioids and alcohol, risk for respiratory depression, confusion, change in LOC and death  - ok to transfer back to Addiction Medicine (stable from OUD perspective) Has completed 3 RC visits.   - gabapentin (NEURONTIN) 300 MG capsule; Take 1 capsule (300 mg) by mouth 2 times daily  Dispense: 60 capsule; Refill: 0  - topiramate (TOPAMAX) 50 MG tablet; Take 1/2 tablet by mouth at bedtime for 1 week, then increase to 1 tablet by mouth once daily  Dispense: 30 tablet; Refill: 0  - Phosphatidylethanol (PEth), Whole Blood; Future    2. Uncomplicated opioid dependence (H)  Controlled. Continue Suboxone 2 mg SL BID. Discussed risks and benefits of continued Suboxone rx with chronic alcohol use. Pt  verbalizes understanding and agrees to CD eval and following recommendation of .   - pt verifies narcan Rx at home   - buprenorphine-naloxone (SUBOXONE) 2-0.5 MG SUBL sublingual tablet; Place 1 tablet under the tongue 2 times daily  Dispense: 30 tablet; Refill: 0    3. Benzodiazepine abuse (H)  No recent use or cravings.     4. Nicotine use disorder  - encouraged smoking cessation, pt reports having nicotine patches and gum at home.        Return in about 2 weeks (around 9/20/2022) for Follow up, in person with Dr. Arciniega in Addiction Mediction .    SUBJECTIVE                                                        CC/HPI:  Justus Cope is a 52 year old male with PMH neck pain,  "depression with anxiety, moderate alcohol use disorder, tobacco dependence, and opioid dependence  who presents to the Recovery Clinic for follow up. Pt was referred by Dr. Shimon Arciniega in Addiction medication.      First Recovery Clinic visit:  7/21/22     Brief history of use:   Previously following with Dr. Arciniega in Addiction medicine for ongoing management of opioid dependence and alcohol use disorder moderate. At initial visit pt reports he has continue Suboxone despite being \"out\" for 2 months. He had been breaking each tablet into multiple pieces and taking it once per day. He will be a couple days between doses. Denies recent full opioid agonist use. Reports he was taking percocet when he was living in [place]. He is currently working full time at 2 different jobs, [employer] and as a PCA. Reports he receives alcohol from his job. Reports drinking about 3 beers every other day. His goal is NOT abstinence. He has been previously positive for benzo's in the past. No prescribed this medication. He has been going to . He does not feel he has time to go to treatment. He \"never knows his schedule\"  \"I don't buy cases of beer\" \"I know my drinking is problematic.\"      Substance Use History :  Opioids:   Age at first use: [birth year]2010  Current use: timing of last use: 2015; substance: prescribed pain meds; route: oral and snort                 IV drug use: No   History of overdose: No  Previous treatments : No  Longest period of sobriety: currently   Medical complications related to substance use: denies  Hepatitis C: negative per pt; no recent screening found   HIV: negative per pt; no recent screening found     Other Addiction History:  Stimulants:   Past use: denies  Use in last 6 months: denies  Sedatives/hypnotics/anxiolytics:   Past use: denies  Use in last 6 months: denies - UDS positive for BZO on 7/21/22  Alcohol:   Past use: occasional   Use in last 6 months: 2-4 beers every other day  Nicotine: " "  Cigarettes: 1 pack daily  Vaping: denies  Chewing tobacco: denies  Cannabis:   Past use: occasionally  Use in last 6 months: denies  Hallucinogens:   Past use: not for over 25 years  Use in last 6 months: denies  Behavioral addictions:   denies    Most recent Recovery Clinic visit 8/18/22.    1. Moderate alcohol use disorder (H)  - pt reports continues alcohol use, reviewed results of peth testing from last visit and pt admits to more alcohol use than previously stated. 6-7 beers 3 x per week, 3 beers 4 x per week. He does want to decrease or stop drinking.   - Strongly recommended CD eval and following recommendation of . Phone number for Behavioral Access Provided today. MI utilized to discuss pt current perceptions of risks and benefits of ongoing alcohol use.   - Start Topamax, titrate to 50 mg per day.  - Continue Gabapentin 300 mg PO BID, can increase to TID if needed. Continue Clonidine 0.1 mg PO TID PRN.   - Discussed importance of avoiding isolation, building a network of supportive relationships, avoiding people/places/things associated with past use to reduce risk of relapse  - reviewed risk of combining opioids and alcohol, risk for respiratory depression, confusion, change in LOC and death     2. Uncomplicated opioid dependence (H)  Controlled. Continue Suboxone 2 mg SL BID.   - pt verifies narcan Rx at home   - buprenorphine-naloxone (SUBOXONE) 2-0.5 MG SUBL sublingual tablet; Place 1 tablet under the tongue 2 times daily  Dispense: 28 tablet; Refill: 0     3. Benzodiazepine abuse (H)  No recent use or cravings.      Return in about 2 weeks (around 9/1/2022) for Follow up, with me, in person.    Today, pt states:   - Reports alcohol use has remained the same. Yesterday he drank 2 shots and 3 beers.On average drinking 3-6 beers per day. Denies \"cravings\" rather reports \"ritual\"   - He has not scheduled CD eval but does plan to do  this   - Not storing alcohol in the house   - No recent benzo " use or cravings   - Taking Suboxone twice daily as prescribed. No adverse SE. No constipation, leg swelling, dry mouth.   - He was able to not drink for 2 days without withdrawal symptoms. He is taking Gabapentin and Clonidine as needed.   - He has not started Topamax yet,  reports he did not pick it up from that pharmacy   - he is interested in smoking cessation     [place] Prescription Drug Monitoring Program Reviewed:  Yes:   08/18/2022  2   08/18/2022  Buprenorphine-Nalox 2-0.5mg Tb    28.00  14       Medications:  cloNIDine (CATAPRES) 0.1 MG tablet, Take 1 tablet (0.1 mg) by mouth 3 times daily as needed (anxiety, high bp)    No current facility-administered medications on file prior to visit.      No Known Allergies    PMH, PSH, FamHx reviewed  PAST PSYCHIATRIC HISTORY:  Diagnoses- depression with anxiety   Suicide Attempts: No   Hospitalizations: No      Social History  Housing status: with girlfriend  Employment status: Employed full time  Relationship status: Partnered  Children: 1 child, 28 YO  Legal: denies  Insurance needs: active  Contact information up to date? yes    OBJECTIVE                                                      /79   Pulse 96     Physical Exam  Constitutional:       General: He is not in acute distress.     Appearance: Normal appearance.   Eyes:      Extraocular Movements: Extraocular movements intact.   Pulmonary:      Effort: Pulmonary effort is normal.   Neurological:      General: No focal deficit present.      Mental Status: He is alert and oriented to person, place, and time.   Psychiatric:         Attention and Perception: Attention and perception normal.         Mood and Affect: Affect normal. Mood is anxious.         Speech: Speech normal.         Behavior: Behavior is cooperative.         Thought Content: Thought content normal.         Cognition and Memory: Cognition and memory normal.      Comments: Insight and judgment fair          Labs:    UDS: positive for  buprenorphine  *POC urine drug screen does not screen for Fentanyl    No results found for this or any previous visit (from the past 240 hour(s)).    Patient counseling completed today:  Discussed mechanism of action, potential risks/benefits/side effects of medications and other recommendations above.  Recommend pt keep naloxone in their possession and reviewed other aspects of harm reduction to reduce risk of overdose with return to use.   Recommended avoiding concurrent use of alcohol, benzodiazepines or other sedatives with buprenorphine or other opioids.  Discussed importance of avoiding isolation, building a network of supportive relationships, avoiding people/places/things associated with past use to reduce risk of relapse; including motivational interviewing regarding psychosocial treatment for addiction.     CARLOS Fernandes CNP  [facility]  [address] [place] [address] [phone]

## 2022-09-08 LAB
PLPETH BLD-MCNC: 1029 NG/ML
POPETH BLD-MCNC: 1684 NG/ML

## 2022-09-24 ENCOUNTER — HEALTH MAINTENANCE LETTER (OUTPATIENT)
Age: 52
End: 2022-09-24

## 2022-09-29 ENCOUNTER — TELEPHONE (OUTPATIENT)
Dept: BEHAVIORAL HEALTH | Facility: CLINIC | Age: 52
End: 2022-09-29

## 2022-09-29 NOTE — TELEPHONE ENCOUNTER
Reason for call:  Medication   If this is a refill request, has the caller requested the refill from the pharmacy already? No  Will the patient be using a [facility]? No  Name of the pharmacy and phone number for the current request: [facility] [place] [address]  [phone]    Name of the medication requested: buprenorphine-naloxone (SUBOXONE) 2-0.5 MG SUBL sublingual tablet    Other request: Patient is needing a refilled, has been out for a couple of days. He has an appointment tomorrow to see Dr. Dawn 9/30/22 at 10am. Would like a phone call once its confirmed     Phone number to reach patient:  Cell number on file:    Telephone Information:   Mobile [phone]       Best Time:  ASAP    Can we leave a detailed message on this number?  Not Applicable    Travel screening: Not Applicable

## 2022-09-30 ENCOUNTER — TELEPHONE (OUTPATIENT)
Dept: BEHAVIORAL HEALTH | Facility: CLINIC | Age: 52
End: 2022-09-30

## 2022-09-30 ENCOUNTER — OFFICE VISIT (OUTPATIENT)
Dept: BEHAVIORAL HEALTH | Facility: CLINIC | Age: 52
End: 2022-09-30

## 2022-09-30 ENCOUNTER — OFFICE VISIT (OUTPATIENT)
Dept: BEHAVIORAL HEALTH | Facility: CLINIC | Age: 52
End: 2022-09-30
Payer: COMMERCIAL

## 2022-09-30 VITALS — HEART RATE: 91 BPM | DIASTOLIC BLOOD PRESSURE: 102 MMHG | SYSTOLIC BLOOD PRESSURE: 159 MMHG

## 2022-09-30 DIAGNOSIS — F11.20 OPIOID USE DISORDER, SEVERE, DEPENDENCE (H): Primary | ICD-10-CM

## 2022-09-30 DIAGNOSIS — F11.20 UNCOMPLICATED OPIOID DEPENDENCE (H): ICD-10-CM

## 2022-09-30 DIAGNOSIS — F10.20 MODERATE ALCOHOL USE DISORDER (H): ICD-10-CM

## 2022-09-30 DIAGNOSIS — F13.10 BENZODIAZEPINE ABUSE (H): Primary | ICD-10-CM

## 2022-09-30 PROCEDURE — 99214 OFFICE O/P EST MOD 30 MIN: CPT | Performed by: FAMILY MEDICINE

## 2022-09-30 PROCEDURE — 80346 BENZODIAZEPINES1-12: CPT | Mod: 90 | Performed by: FAMILY MEDICINE

## 2022-09-30 PROCEDURE — H0038 SELF-HELP/PEER SVC PER 15MIN: HCPCS

## 2022-09-30 PROCEDURE — 99000 SPECIMEN HANDLING OFFICE-LAB: CPT | Performed by: FAMILY MEDICINE

## 2022-09-30 RX ORDER — BUPRENORPHINE HYDROCHLORIDE AND NALOXONE HYDROCHLORIDE DIHYDRATE 2; .5 MG/1; MG/1
1 TABLET SUBLINGUAL 2 TIMES DAILY
Qty: 30 TABLET | Refills: 0 | Status: SHIPPED | OUTPATIENT
Start: 2022-09-30 | End: 2022-10-14

## 2022-09-30 RX ORDER — BUPRENORPHINE HYDROCHLORIDE AND NALOXONE HYDROCHLORIDE DIHYDRATE 2; .5 MG/1; MG/1
1 TABLET SUBLINGUAL 2 TIMES DAILY
Qty: 60 TABLET | Refills: 0 | Status: SHIPPED | OUTPATIENT
Start: 2022-09-30 | End: 2022-09-30

## 2022-09-30 RX ORDER — GABAPENTIN 300 MG/1
300 CAPSULE ORAL 2 TIMES DAILY
Qty: 60 CAPSULE | Refills: 0 | Status: SHIPPED | OUTPATIENT
Start: 2022-09-30 | End: 2023-03-07

## 2022-09-30 ASSESSMENT — PATIENT HEALTH QUESTIONNAIRE - PHQ9: SUM OF ALL RESPONSES TO PHQ QUESTIONS 1-9: 0

## 2022-09-30 NOTE — PATIENT INSTRUCTIONS
Call to set up substance use evaluation (Rule 25):  [phone].  I'll ask Dhiraj to reach out to you to check on status of this next week and a little extra accountability.      Start topiramate (Topamax) - take 1/2 tablet in the evening for 6-8 nights, then increase to 1 tablet.      Continue gabapentin.    Continue Suboxone.      Follow-up here in 2 weeks.  Then plan for visit with Dr Arciniega on 11/1.

## 2022-09-30 NOTE — TELEPHONE ENCOUNTER
Called patient and gave him the information about his appointment and the number to call and schedule. He will make the call.    Deann Flores RN on 9/30/2022 at 12:32 PM

## 2022-09-30 NOTE — TELEPHONE ENCOUNTER
This writer met with the client and he stated that he has history of abuse of alcohol and that he has been taking medications to assist in his recovery-he stated that he drinks alcohol less but is still challenged with meeting his goal of being completely sober of alcohol consumption-he stated that he does not use alcohol everyday and it just tends to happen around the time he comes for his appointments-he stated that his partner is alcohol sober and that he and his partner have gone to some meetings together-he stated that he would like to attend outpatient CD treatment but the barrier is his jobs about 60 hours of working a week-he was given the recommendation to meet with this worker and a supplement to his recovery and for him to increase his attendance to NA meeting to establish more routines and habits that support his recovery efforts.  He aslo stated that he has not history of attendance issues with his original addiction medicine provider.

## 2022-09-30 NOTE — NURSING NOTE
[facility]      Rooming information:  Approximate last use of illicit opioids: 2015  Taking buprenorphine? Yes:  As prescribed? Yes:   Number of buprenorphine films/tablets remaining currently: 0  Side effects related to buprenorphine (constipation, dry mouth, sedation?) No   Narcan currently available: Yes  Other recent substance use:    Alcohol   NICOTINE-Yes:   If using nicotine, ready to quit? No    Point of care urine drug screen positive for:  Bup and Benzo     *POC urine drug screen does not screen for Fentanyl      PHQ Assesment Total Score(s) 9/6/2022   PHQ-9 Score 0   Some recent data might be hidden       If PHQ-9 score of 15 or higher, has Recovery Clinic therapist or provider been notified? N/A    Any current suicidal ideation? No  If yes, has Recovery Clinic therapist or provider been notified? N/A    Primary care provider: Physician No Ref-Primary     Mental health provider: denies (follow up on MH referral if needed)    Insurance needs: active    Housing needs: stable     Contact information up to date? yes    3rd Party Involvement not at this time (please obtain ROBBIE if pt would like to include)    Jennifer Manzo MA  September 30, 2022  10:15 AM

## 2022-09-30 NOTE — TELEPHONE ENCOUNTER
Please reach out to patient:    We erroneously thought he already had LAZARO evaluation scheduled for 10/4 (it was another patient).    Please have him call [phone] (also on his AVS).      Thanks!    Marylu Dawn, DO on 9/30/2022 at 11:53 AM

## 2022-09-30 NOTE — PROGRESS NOTES
[facility]    Peer  met with Justus Cope in the Recovery Clinic to introduce himself, detail services provided and discuss current status of recovery. Pt appeared alert, oriented and open to feedback during our discussion.     Pt arrives for visit with provider for suboxone refill.  Pt reports taking suboxone as prescribed by the provider and opioid substance recoveryis going well.     At the encouragement of  provider the pt will be scheduling an assessment with [facility] at [phone] for alcohol substance use and recommendations for necessary treatments related to it. PRC and pt agree to connect via phone next week to determine progress in scheduling and completing the assessment.     PRC welcomes contact for recovery based support and resources. PRC and pt agree to speak again during an upcoming  visit.           Service Type:     Individual     Session Start Time:     11:00 AM                    Session End Time:       11:15 AM    Session Length:        15 minutes     Patient Goal: To utilize suboxone assisted treatment for sobriety and long term recovery.   Pt will schedule a comprehensive assessment with [facility] related to alcohol substance use.     Goal Progress:   Ongoing.    Key Risk Factors to Recovery:   PRC encourages being aware of risk factors that can lead to re-use which include avoiding isolation, avoiding triggers and managing cravings in a healthy manner. being open and willing to acceptance and change on a daily basis.  PRC encourages pt to establish a sober network calling tree to reach out to when needed.  Continue to practice honesty with ourselves and trusted support person(s).   PRC encourages regular attendance at recovery based meetings as well as finding a sponsor for mentoring and accountability.   PRC encourages consideration of other services such as counseling for mental health issues which can correlate with  our substance use.      Support Needs:   Ongoing care, support and resources for opioid substance use disorder.     Follow up:   PRC has provided pt with his contact information for support and resource needs.    PRC and pt agree to meet during an upcoming  visit.       [facility]  [address], [address]   [place], [place], [address]  Clinic Phone: [phone]  Clinic Fax: [phone]  Peer  phone: [phone]    Open Monday - Friday  9:00am-4:00pm  Walk in hours: 9am-3pm      Shashank Vásquez  September 30, 2022  2:32 PM    Doris Taveras MA, [facility] provides clinical  oversite and supervision of care.

## 2022-09-30 NOTE — TELEPHONE ENCOUNTER
Reason for call:  Other   Patient called regarding (reason for call): call back  Additional comments: konstantin sent 2 of the same rx supply to pharmacy. Was this intentional for future refill or a mistake. Please confirm    Phone number to reach patient: zenia [phone] press [phone]   Best Time: any     Can we leave a detailed message on this number?  YES    Travel screening: Negative

## 2022-09-30 NOTE — PROGRESS NOTES
[facility] Follow Up    ASSESSMENT/PLAN                                                      1. Moderate alcohol use disorder (H)  He reports desire to abstain from alcohol and reports decreasing use, citing risks of interaction with his medication (specifically risk of respiratory depression) and health risks as rationale.  Discussed [facility]O evaluation goal to find additional supports to help him move towards his goal of abstinence.  Discussed viewing alcohol intake as a transition from work to home and how finding other ways to make this transition will be helpful.  Continue   - gabapentin (NEURONTIN) 300 MG capsule; Take 1 capsule (300 mg) by mouth 2 times daily  Dispense: 60 capsule; Refill: 0    2. Uncomplicated opioid dependence (H)  Stable.  Continue to take Suboxone 2-0.5mg BID.  Reviewed risk of combing with alcohol and benzos.    - buprenorphine-naloxone (SUBOXONE) 2-0.5 MG SUBL sublingual tablet; Place 1 tablet under the tongue 2 times daily  Dispense: 30 tablet; Refill: 0    3. Benzodiazepine abuse (H)  Reports last use of benzos was several months ago.  Will send confirmatory testing.    - Benzodiazepines, Urine, Quantitative; Future    When checking out today we erroneously thought he was scheduled next week for LAZARO evaluation.  Will ask staff to reach out to remind him to schedule.         Return in about 2 weeks (around 10/14/2022), or if symptoms worsen or fail to improve, for Follow up, in person.    SUBJECTIVE                                                          CC/HPI:  Justus Cope is a 52 year old male with PMH neck pain, depression with anxiety, moderate alcohol use disorder, tobacco dependence, and opioid dependence  who presents to the Recovery Clinic for follow up. Pt was referred by Dr. Shimon Arciniega in Addiction medication.      First Recovery Clinic visit:  7/21/22     Brief history of use:   Previously following with Dr. Arciniega in Addiction medicine for  "ongoing management of opioid dependence and alcohol use disorder moderate. At initial visit pt reports he has continue Suboxone despite being \"out\" for 2 months. He had been breaking each tablet into multiple pieces and taking it once per day. He will be a couple days between doses. Denies recent full opioid agonist use. Reports he was taking percocet when he was living in [place]. 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 Reports drinking about 3 beers every other day. His goal is NOT abstinence. He has been previously positive for benzo's in the past. No prescribed this medication. He has been going to . He does not feel he has time to go to treatment. He \"never knows his schedule\"  \"I don't buy cases of beer\" \"I know my drinking is problematic.\"      Substance Use History :  Opioids:   Age at first use: [birth year]2010  Current use: timing of last use: 2015; substance: prescribed pain meds; route: oral and snort                 IV drug use: No   History of overdose: No  Previous treatments : No  Longest period of sobriety: currently   Medical complications related to substance use: denies  Hepatitis C: negative per pt; no recent screening found   HIV: negative per pt; no recent screening found     Other Addiction History:  Stimulants:   Past use: denies  Use in last 6 months: denies  Sedatives/hypnotics/anxiolytics:   Past use: denies  Use in last 6 months: denies - UDS positive for BZO on 7/21/22  Alcohol:   Past use: occasional   Use in last 6 months: 2-4 beers every other day  Nicotine:   Cigarettes: 1 pack daily  Vaping: denies  Chewing tobacco: denies  Cannabis:   Past use: occasionally  Use in last 6 months: denies  Hallucinogens:   Past use: not for over 25 years  Use in last 6 months: denies  Behavioral addictions:   denies    Most recent Recovery Clinic visit:  9/6/22   Important points and recommendations last visit:  1. " Moderate alcohol use disorder (H)  - reports continued alcohol use. Reiterates motivation to stop drinking. He has not completed CD eval, requesting phone number to call and schedule this. Open to IOP/outpatient program.   - PEth today   - Reviewed recommendation to start Topamax.   - Continue Gabapentin 300 mg PO BID, can increase to TID if needed. Continue Clonidine 0.1 mg PO TID PRN.   - reviewed risk of combining opioids and alcohol, risk for respiratory depression, confusion, change in LOC and death  - ok to transfer back to Addiction Medicine (stable from OUD perspective) Has completed 3 RC visits.   - gabapentin (NEURONTIN) 300 MG capsule; Take 1 capsule (300 mg) by mouth 2 times daily  Dispense: 60 capsule; Refill: 0  - topiramate (TOPAMAX) 50 MG tablet; Take 1/2 tablet by mouth at bedtime for 1 week, then increase to 1 tablet by mouth once daily  Dispense: 30 tablet; Refill: 0  - Phosphatidylethanol (PEth), Whole Blood; Future     2. Uncomplicated opioid dependence (H)  Controlled. Continue Suboxone 2 mg SL BID. Discussed risks and benefits of continued Suboxone rx with chronic alcohol use. Pt  verbalizes understanding and agrees to CD eval and following recommendation of .   - pt verifies narcan Rx at home   - buprenorphine-naloxone (SUBOXONE) 2-0.5 MG SUBL sublingual tablet; Place 1 tablet under the tongue 2 times daily  Dispense: 30 tablet; Refill: 0     3. Benzodiazepine abuse (H)  No recent use or cravings.      4. Nicotine use disorder  - encouraged smoking cessation, pt reports having nicotine patches and gum at home.     Today, pt states he is doing well.  Taking Suboxone as prescribed.  No opioid cravings or side effects.      Last benzo use was a few month ago @ .      Trying to slow down on alcohol use.  Not drinking daily.  Drinking about 2 days per week, a few beers when he does drink.  Sees drinking with dinner as a way to separate work day from the rest of the day.  Self  "rewarding with alcohol.  Reports that he told the folks he works with that he has pancreatitis from drinking too much when younger so that they do not offer him alcohol, \"It all sits with me at home.\"      [place] Prescription Drug Monitoring Program Reviewed:  Yes; as expected    Medications:  cloNIDine (CATAPRES) 0.1 MG tablet, Take 1 tablet (0.1 mg) by mouth 3 times daily as needed (anxiety, high bp)  topiramate (TOPAMAX) 50 MG tablet, Take 1/2 tablet by mouth at bedtime for 1 week, then increase to 1 tablet by mouth once daily (Patient not taking: Reported on 9/30/2022)    No current facility-administered medications on file prior to visit.      No Known Allergies    PMH, PSH, FamHx reviewed    PAST PSYCHIATRIC HISTORY:  Diagnoses- depression with anxiety   Suicide Attempts: No   Hospitalizations: No      Social History  Housing status: with girlfriend  Employment status: Employed full time  Relationship status: Partnered  Children: 1 child, 28 YO  Legal: denies  Insurance needs: active  Contact information up to date? yes    OBJECTIVE                                                      BP (!) 159/102   Pulse 91     Physical Exam  Vitals and nursing note reviewed.   Constitutional:       General: He is not in acute distress.     Appearance: Normal appearance. He is not ill-appearing or diaphoretic.   HENT:      Head: Normocephalic and atraumatic.   Eyes:      General: No scleral icterus.     Conjunctiva/sclera: Conjunctivae normal.   Cardiovascular:      Rate and Rhythm: Normal rate.   Pulmonary:      Effort: Pulmonary effort is normal. No respiratory distress.   Skin:     General: Skin is warm and dry.      Coloration: Skin is not jaundiced or pale.   Neurological:      General: No focal deficit present.      Mental Status: He is alert and oriented to person, place, and time.      Gait: Gait normal.   Psychiatric:         Mood and Affect: Mood is anxious.         Speech: Speech normal.         Behavior: " Behavior normal. Behavior is cooperative.         Thought Content: Thought content normal.         Judgment: Judgment normal.         Labs:    UDS: buprenorphine and benzodiazepines  *POC urine drug screen does not screen for Fentanyl    No results found for this or any previous visit (from the past 240 hour(s)).      Patient counseling completed today:  Discussed mechanism of action, potential risks/benefits/side effects of medications and other recommendations above.  Recommend pt keep naloxone in their possession and reviewed other aspects of harm reduction to reduce risk of overdose with return to use.   Recommended avoiding concurrent use of alcohol, benzodiazepines or other sedatives with buprenorphine or other opioids.  Discussed importance of avoiding isolation, building a network of supportive relationships, avoiding people/places/things associated with past use to reduce risk of relapse; including motivational interviewing regarding psychosocial treatment for addiction.     Marylu Dawn, [place] [id number] [place] [id number] [phone]

## 2022-09-30 NOTE — TELEPHONE ENCOUNTER
RN called pharmacy back and spoke to Karlene. She states that 2 scripts were sent for buprenorphine-naloxone (SUBOXONE) 2-0.5 MG SUBL sublingual tablets. One for 30 tablets (15 day supply) and one for 60 tablets (30 day) supply. RN reviewed chart and there is only one active script for #30 tablets. RN reviewed providers note from 9/30/22:    2. Uncomplicated opioid dependence (H)  Stable.  Continue to take Suboxone 2-0.5mg BID.  Reviewed risk of combing with alcohol and benzos.    - buprenorphine-naloxone (SUBOXONE) 2-0.5 MG SUBL sublingual tablet; Place 1 tablet under the tongue 2 times daily  Dispense: 30 tablet; Refill: 0    RN requested pharmacist cancel the #60 tablet order and continue with the active #30 tablet fill and she agreed. Routing to provider for review.     Deann Flores RN on 9/30/2022 at 1:36 PM

## 2022-10-06 LAB
1OH-MIDAZOLAM UR CFM-MCNC: <20 NG/ML
7AMINOCLONAZEPAM UR CFM-MCNC: <5 NG/ML
A-OH ALPRAZ UR CFM-MCNC: <5 NG/ML
ALPRAZ UR CFM-MCNC: <5 NG/ML
CHLORDIAZEP UR CFM-MCNC: <20 NG/ML
CLONAZEPAM UR CFM-MCNC: <5 NG/ML
DIAZEPAM UR CFM-MCNC: <20 NG/ML
LORAZEPAM UR CFM-MCNC: <20 NG/ML
MIDAZOLAM UR CFM-MCNC: <20 NG/ML
NORDIAZEPAM UR CFM-MCNC: <20 NG/ML
OXAZEPAM UR CFM-MCNC: 36 NG/ML
TEMAZEPAM UR CFM-MCNC: 35 NG/ML

## 2022-10-12 ENCOUNTER — TELEPHONE (OUTPATIENT)
Dept: BEHAVIORAL HEALTH | Facility: CLINIC | Age: 52
End: 2022-10-12
Payer: COMMERCIAL

## 2022-10-12 DIAGNOSIS — F11.20 OPIOID USE DISORDER, SEVERE, DEPENDENCE (H): Primary | ICD-10-CM

## 2022-10-12 PROCEDURE — 99207 PR SELF-HELP/PEER SVC PER 15 MIN: CPT

## 2022-10-12 NOTE — TELEPHONE ENCOUNTER
[facility] placed call to pt for reminder to schedule a comprehensive assessment with [facility].  Recd voicemail and left message with reminder to call - [phone].    Shashank Vásquez  Peer   Recovery Clinic      9:30 AM

## 2022-10-14 ENCOUNTER — OFFICE VISIT (OUTPATIENT)
Dept: BEHAVIORAL HEALTH | Facility: CLINIC | Age: 52
End: 2022-10-14
Payer: COMMERCIAL

## 2022-10-14 VITALS — DIASTOLIC BLOOD PRESSURE: 83 MMHG | HEART RATE: 89 BPM | SYSTOLIC BLOOD PRESSURE: 135 MMHG

## 2022-10-14 DIAGNOSIS — F10.20 MODERATE ALCOHOL USE DISORDER (H): Primary | ICD-10-CM

## 2022-10-14 DIAGNOSIS — F13.10 BENZODIAZEPINE ABUSE (H): ICD-10-CM

## 2022-10-14 DIAGNOSIS — F11.20 OPIOID USE DISORDER, SEVERE, DEPENDENCE (H): Primary | ICD-10-CM

## 2022-10-14 DIAGNOSIS — F11.20 OPIOID USE DISORDER, SEVERE, DEPENDENCE (H): ICD-10-CM

## 2022-10-14 PROCEDURE — H0038 SELF-HELP/PEER SVC PER 15MIN: HCPCS

## 2022-10-14 PROCEDURE — 99214 OFFICE O/P EST MOD 30 MIN: CPT | Performed by: FAMILY MEDICINE

## 2022-10-14 PROCEDURE — 99000 SPECIMEN HANDLING OFFICE-LAB: CPT | Performed by: FAMILY MEDICINE

## 2022-10-14 PROCEDURE — 80346 BENZODIAZEPINES1-12: CPT | Mod: 90 | Performed by: FAMILY MEDICINE

## 2022-10-14 RX ORDER — BUPRENORPHINE HYDROCHLORIDE AND NALOXONE HYDROCHLORIDE DIHYDRATE 2; .5 MG/1; MG/1
1 TABLET SUBLINGUAL 2 TIMES DAILY
Qty: 36 TABLET | Refills: 0 | Status: SHIPPED | OUTPATIENT
Start: 2022-10-14 | End: 2022-11-01

## 2022-10-14 ASSESSMENT — PATIENT HEALTH QUESTIONNAIRE - PHQ9: SUM OF ALL RESPONSES TO PHQ QUESTIONS 1-9: 0

## 2022-10-14 NOTE — PATIENT INSTRUCTIONS
LAZARO evaluation scheduled for:  10/18 @ 10:30am (video visit, 90 minutes)    Continue Suboxone.      We discussed risks of combining alcohol, benzos, and buprenorphine (or other opioids).

## 2022-10-14 NOTE — PROGRESS NOTES
[facility] Follow Up    ASSESSMENT/PLAN                                                      1. Moderate alcohol use disorder (H)  Needs improvement.  He is motivated to make change.  We called and scheduled LAZARO evaluation during visit today for 10/18.  Continue gabapentin.  Reviewed concerns for ongoing use in combination with Suboxone.  Discussed risks of alcohol withdrawal.      2. Benzodiazepine abuse (H)  Discussed recent confirmatory test was positive for diazepam metabolites.  Recommend complete abstinence.  Await results from confirmatory tests today to watch metabolite levels, based on history should be decreasing.  LAZARO eval on 10/18.  Reviewed risks of combining benzodiazepines with buprenorphine and alcohol, including death.  Also discussed risks of benzo withdrawal.  - Benzodiazepines, Urine, Quantitative; Future  - Benzodiazepines, Urine, Quantitative    3. Opioid use disorder, severe, dependence (H)  Stable.  Continue Suboxone, no change.  Advised against use of other sedating substances while on Suboxone and that this could lead to unintentional overdose.    - buprenorphine-naloxone (SUBOXONE) 2-0.5 MG SUBL sublingual tablet; Place 1 tablet under the tongue 2 times daily  Dispense: 36 tablet; Refill: 0       Return in 18 days (on 11/1/2022) for Follow up, in person.    SUBJECTIVE                                                          CC/HPI:  Justus Cope is a 52 year old male with PMH neck pain, depression with anxiety, moderate alcohol use disorder, tobacco dependence, and opioid dependence  who presents to the Recovery Clinic for follow up. Pt was referred by Dr. Shimon Arciniega in Addiction medication.      First Recovery Clinic visit:  7/21/22     Brief history of use:   Previously following with Dr. Arciniega in Addiction medicine for ongoing management of opioid dependence and alcohol use disorder moderate. At initial visit pt reports he has continue Suboxone despite  "being \"out\" for 2 months. He had been breaking each tablet into multiple pieces and taking it once per day. He will be a couple days between doses. Denies recent full opioid agonist use. Reports he was taking percocet when he was living in [place]. 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 Reports drinking about 3 beers every other day. His goal is NOT abstinence. He has been previously positive for benzo's in the past. No prescribed this medication. He has been going to . He does not feel he has time to go to treatment. He \"never knows his schedule\"  \"I don't buy cases of beer\" \"I know my drinking is problematic.\"      Substance Use History :  Opioids:   Age at first use: [birth year]2010  Current use: timing of last use: 2015; substance: prescribed pain meds; route: oral and snort                 IV drug use: No   History of overdose: No  Previous treatments : No  Longest period of sobriety: currently   Medical complications related to substance use: denies  Hepatitis C: negative per pt; no recent screening found   HIV: negative per pt; no recent screening found     Other Addiction History:  Stimulants:   Past use: denies  Use in last 6 months: denies  Sedatives/hypnotics/anxiolytics:   Past use: denies  Use in last 6 months: denies - UDS positive for BZO on 7/21/22  Alcohol:   Past use: occasional   Use in last 6 months: 2-4 beers every other day  Nicotine:   Cigarettes: 1 pack daily  Vaping: denies  Chewing tobacco: denies  Cannabis:   Past use: occasionally  Use in last 6 months: denies  Hallucinogens:   Past use: not for over 25 years  Use in last 6 months: denies  Behavioral addictions:   denies    Most recent Recovery Clinic visit:  9/30/22   Important points and recommendations last visit:  1. Moderate alcohol use disorder (H)  He reports desire to abstain from alcohol and reports decreasing use, citing risks of interaction with " his medication (specifically risk of respiratory depression) and health risks as rationale.  Discussed LAZARO evaluation goal to find additional supports to help him move towards his goal of abstinence.  Discussed viewing alcohol intake as a transition from work to home and how finding other ways to make this transition will be helpful.  Continue   - gabapentin (NEURONTIN) 300 MG capsule; Take 1 capsule (300 mg) by mouth 2 times daily  Dispense: 60 capsule; Refill: 0     2. Uncomplicated opioid dependence (H)  Stable.  Continue to take Suboxone 2-0.5mg BID.  Reviewed risk of combing with alcohol and benzos.    - buprenorphine-naloxone (SUBOXONE) 2-0.5 MG SUBL sublingual tablet; Place 1 tablet under the tongue 2 times daily  Dispense: 30 tablet; Refill: 0     3. Benzodiazepine abuse (H)  Reports last use of benzos was several months ago.  Will send confirmatory testing.    - Benzodiazepines, Urine, Quantitative; Future    Today, pt states things have been going well since last visit.  Has been busy at work (someone quit).      No side effects from Suboxone, no opioid cravings.  Taking Suboxone 2-0.5mg BID as prescribed.    In terms of drinking, he reports that the week following his last appointment he drank less (both in number of drinking days and less consumed on drinking days), this past week drank more (3 days, 12 beers over the course and shot of Fireball).      Denies any recent benzodiazepine use.  We reviewed last visit's urine confirmation was positive for benzodiazepines.  He states that he has not taken any benzos, except Valium for >1 month.      Has not scheduled LAZARO evaluation.     [place] Prescription Drug Monitoring Program Reviewed:  Yes; as expected    Medications:  cloNIDine (CATAPRES) 0.1 MG tablet, Take 1 tablet (0.1 mg) by mouth 3 times daily as needed (anxiety, high bp)  gabapentin (NEURONTIN) 300 MG capsule, Take 1 capsule (300 mg) by mouth 2 times daily  topiramate (TOPAMAX) 50 MG tablet,  Take 1/2 tablet by mouth at bedtime for 1 week, then increase to 1 tablet by mouth once daily (Patient not taking: Reported on 9/30/2022)    No current facility-administered medications on file prior to visit.      No Known Allergies    PMH, PSH, FamHx reviewed    PAST PSYCHIATRIC HISTORY:  Diagnoses- depression with anxiety   Suicide Attempts: No   Hospitalizations: No      Social History  Housing status: with girlfriend  Employment status: Employed full time  Relationship status: Partnered  Children: 1 child, 28 YO  Legal: denies  Insurance needs: active  Contact information up to date? yes    OBJECTIVE                                                      /83   Pulse 89     Physical Exam  Vitals and nursing note reviewed.   Constitutional:       General: He is not in acute distress.     Appearance: Normal appearance. He is not ill-appearing or diaphoretic.   HENT:      Head: Normocephalic and atraumatic.   Eyes:      General: No scleral icterus.     Conjunctiva/sclera: Conjunctivae normal.   Cardiovascular:      Rate and Rhythm: Normal rate.   Pulmonary:      Effort: Pulmonary effort is normal. No respiratory distress.   Skin:     General: Skin is warm and dry.      Coloration: Skin is not jaundiced or pale.   Neurological:      General: No focal deficit present.      Mental Status: He is alert and oriented to person, place, and time.      Gait: Gait normal.   Psychiatric:         Mood and Affect: Mood normal.         Behavior: Behavior normal.         Thought Content: Thought content normal.      Comments: Judgment/insight fair         Labs:    UDS: buprenorphine and benzodiazepines  *[facility] urine drug screen does not screen for Fentanyl    No results found for this or any previous visit (from the past 240 hour(s)).      Patient counseling completed today:  Discussed mechanism of action, potential risks/benefits/side effects of medications and other recommendations above.  Recommend pt keep naloxone in their  possession and reviewed other aspects of harm reduction to reduce risk of overdose with return to use.   Recommended avoiding concurrent use of alcohol, benzodiazepines or other sedatives with buprenorphine or other opioids.  Discussed importance of avoiding isolation, building a network of supportive relationships, avoiding people/places/things associated with past use to reduce risk of relapse; including motivational interviewing regarding psychosocial treatment for addiction.       Marylu Dawn [place] [address] [place] [id number] [phone]

## 2022-10-14 NOTE — NURSING NOTE
[facility]      Rooming information:  Approximate last use of illicit opioids: 2015  Taking buprenorphine? Yes:  As prescribed? Yes:   Number of buprenorphine films/tablets remaining currently: 2  Side effects related to buprenorphine (constipation, dry mouth, sedation?) No   Narcan currently available: Yes  Other recent substance use:    Alcohol   NICOTINE-Yes:   If using nicotine, ready to quit? Yes:     Point of care urine drug screen positive for:  Urine Drug Screen Results  AMP: Negative  BAR: Negative  BUP: Positive  BZO: Positive  SHIRA: Negative  mAMP: Negative  MDMA : Negative  MTD: Negative  [id number]: Negative  OXY: Negative  PCP : Negative  THC : Negative  *[facility] urine drug screen does not screen for Fentanyl      PHQ Assesment Total Score(s) 10/14/2022   PHQ-9 Score 0   Some recent data might be hidden       If PHQ-9 score of 15 or higher, has Recovery Clinic therapist or provider been notified? No    Any current suicidal ideation? No  If yes, has Recovery Clinic therapist or provider been notified? N/A    Primary care provider: Physician No Ref-Primary     Mental health provider: susi (follow up on MH referral if needed)    Insurance needs: active    Housing needs: stable    Contact information up to date? yes    3rd Party Involvement not today (please obtain ROBBIE if pt would like to include)    Anne Lea MA  October 14, 2022  9:03 AM

## 2022-10-14 NOTE — PROGRESS NOTES
[facility]    Peer  met with Justus Cope in the Recovery Clinic to introduce himself, detail services provided and discuss current status of recovery. Pt appeared alert, oriented and open to feedback during our discussion.     Pt arrives for visit with provider for suboxone refill.  Pt reports opioid recovery is going well.   Pt reports he will schedule comprehensive assessment for alcohol substance use with [facility] during provider visit today.   PRC encouraged assessment as a tool to indicate if alcohol consumption is problematic or not.  PRC encouraged pt to be open and with the  for a realistic and helpful recommendation.  Pt states he will contact PRC after assessment recommendations are received for thoughts and discussion.     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     PRC welcoming contact for recovery based support and resources. PRC and pt agree to speak again during an upcoming  visit.           Service Type:     Individual     Session Start Time:       9:00 AM                  Session End Time:         9:15 AM    Session Length:         15 minutes    Patient Goal: To utilize suboxone assisted treatment for sobriety and long term recovery.     Goal Progress:   Ongoing.    Key Risk Factors to Recovery:   PRC encourages being aware of risk factors that can lead to re-use which include avoiding isolation, avoiding triggers and managing cravings in a healthy manner. being open and willing to acceptance and change on a daily basis.  PRC encourages pt to establish a sober network calling tree to reach out to when needed.  Continue to practice honesty with ourselves and trusted support person(s).   PRC encourages regular attendance at recovery based meetings as well  as finding a sponsor for mentoring and accountability.   PRC encourages consideration of other services such as counseling for mental health issues which can correlate with our substance use.      Support Needs:   Ongoing care, support and resources for opioid substance use disorder.     Follow up:   SCARLETT has provided pt with his contact information for support and resource needs.    PRC and pt agree to meet during an upcoming  visit.       [facility]  [address], [address]   [place], [place], [address]  Clinic Phone: [phone]  Clinic Fax: [phone]  Peer  phone: [phone]    [facility] Monday - Friday  9:00am-4:00pm  Walk in hours: 9am-3pm      Shashank Vásquez  October 14, 2022  10:47 AM    Doris Taveras MA, [facility] provides clinical  oversite and supervision of care.

## 2022-10-16 LAB
1OH-MIDAZOLAM UR CFM-MCNC: <20 NG/ML
7AMINOCLONAZEPAM UR CFM-MCNC: <5 NG/ML
A-OH ALPRAZ UR CFM-MCNC: <5 NG/ML
ALPRAZ UR CFM-MCNC: <5 NG/ML
CHLORDIAZEP UR CFM-MCNC: <20 NG/ML
CLONAZEPAM UR CFM-MCNC: <5 NG/ML
DIAZEPAM UR CFM-MCNC: <20 NG/ML
LORAZEPAM UR CFM-MCNC: <20 NG/ML
MIDAZOLAM UR CFM-MCNC: <20 NG/ML
NORDIAZEPAM UR CFM-MCNC: 28 NG/ML
OXAZEPAM UR CFM-MCNC: 116 NG/ML
TEMAZEPAM UR CFM-MCNC: 67 NG/ML

## 2022-10-18 ENCOUNTER — HOSPITAL ENCOUNTER (OUTPATIENT)
Dept: BEHAVIORAL HEALTH | Facility: CLINIC | Age: 52
Discharge: HOME | End: 2022-10-18
Attending: FAMILY MEDICINE | Admitting: FAMILY MEDICINE
Payer: COMMERCIAL

## 2022-10-18 VITALS — HEIGHT: 68 IN | WEIGHT: 110 LBS | BODY MASS INDEX: 16.67 KG/M2

## 2022-10-18 PROCEDURE — H0001 ALCOHOL AND/OR DRUG ASSESS: HCPCS | Mod: GT

## 2022-10-18 ASSESSMENT — COLUMBIA-SUICIDE SEVERITY RATING SCALE - C-SSRS
2. HAVE YOU ACTUALLY HAD ANY THOUGHTS OF KILLING YOURSELF?: NO
1. HAVE YOU WISHED YOU WERE DEAD OR WISHED YOU COULD GO TO SLEEP AND NOT WAKE UP?: NO
ATTEMPT LIFETIME: NO
TOTAL  NUMBER OF INTERRUPTED ATTEMPTS LIFETIME: NO
TOTAL  NUMBER OF ABORTED OR SELF INTERRUPTED ATTEMPTS LIFETIME: NO
6. HAVE YOU EVER DONE ANYTHING, STARTED TO DO ANYTHING, OR PREPARED TO DO ANYTHING TO END YOUR LIFE?: NO

## 2022-10-18 ASSESSMENT — ANXIETY QUESTIONNAIRES
1. FEELING NERVOUS, ANXIOUS, OR ON EDGE: SEVERAL DAYS
5. BEING SO RESTLESS THAT IT IS HARD TO SIT STILL: NOT AT ALL
GAD7 TOTAL SCORE: 3
3. WORRYING TOO MUCH ABOUT DIFFERENT THINGS: NOT AT ALL
4. TROUBLE RELAXING: NOT AT ALL
7. FEELING AFRAID AS IF SOMETHING AWFUL MIGHT HAPPEN: SEVERAL DAYS
GAD7 TOTAL SCORE: 3
6. BECOMING EASILY ANNOYED OR IRRITABLE: SEVERAL DAYS
2. NOT BEING ABLE TO STOP OR CONTROL WORRYING: NOT AT ALL

## 2022-10-18 ASSESSMENT — PATIENT HEALTH QUESTIONNAIRE - PHQ9: SUM OF ALL RESPONSES TO PHQ QUESTIONS 1-9: 1

## 2022-10-18 ASSESSMENT — PAIN SCALES - GENERAL: PAINLEVEL: NO PAIN (0)

## 2022-10-18 NOTE — PROGRESS NOTES
[facility]  Provider Name:  JACKIE Vasquez/[facility]     Telephone: [phone]      PATIENT'S NAME: Justus Cope  PREFERRED NAME: Steven  PRONOUNS: he/him/his    MRN: [id number]  : 1970   ACCT. NUMBER:  [id number]  DATE OF SERVICE: 2022  START TIME: 10:30 am  END TIME: 11:34 am  E-MAIL: [email or web address]   PREFERRED PHONE: [phone]   May we leave a program related message: Yes  ADDRESS:   [address] [place] [address] [address] COHEN [address] [place] [id number]  SERVICE MODALITY:  Video Visit:        Provider verified identity through the following two step process.  Patient provided:  Patient  (1970) and Patient's last 4 digits of N ([phone])    Telemedicine Visit: The patient's condition can be safely assessed and treated via synchronous audio and visual telemedicine encounter.      Reason for Telemedicine Visit: Services only offered telehealth    Originating Site (Patient Location): Patient's home    Distant Site (Provider Location): Provider Remote Setting    Consent:  The patient/guardian has verbally consented to: the potential risks and benefits of telemedicine (video visit) versus in person care; bill my insurance or make self-payment for services provided; and responsibility for payment of non-covered services.     Patient would like the video invitation sent by:  My Chart    Mode of Communication:  Video Conference via DocuSpeak    EMERGENCY CONTACT:   Lissy Astorga (girlfriend/significant other)  Tel #: [phone]    UNIVERSAL ADULT SUBSTANCE USE DISORDER DIAGNOSTIC ASSESSMENT    Identifying Information:  The patient is a 52 year old, /White male of Armenian decent.  The pronoun use throughout this assessment reflects the patient's chosen pronoun.  The patient was referred for an assessment by the [facility].  The patient attended the session alone and with his girlfriend/significant other in the  background.     Chief Complaint:   The patient had an assessment via a remote video visit at [facility] on 10/18/2022 with this counselor for evaluation of a possible substance use disorder.  The reason for the substance use disorder assessment was because he was referred to complete a substance use disorder assessment by his Medication Assisted Therapy providers at the [facility].  The patient reported he had been on Medication Assisted Therapy with Suboxone with the [facility] for the past 2-3 years and he had been continuing to drink alcohol and had consistently tested positive for alcohol with his PEt testing.  It also appeared there was a recent drug screen which had come back as positive for benzodiazepine metabolites, but the patient denied having any knowledge of using any benzodiazepines within the past 12-months.  The patient reported he was in the process of trying to wean himself off of alcohol, but it was unclear how committed the patient was to stopping his use of alcohol as he did not see his use of alcohol being very problematic for him in his life, other than the risk of having potential negative interactions with his prescribed Suboxone.  The patient reported his plan was to discontinue his use of alcohol, but he was not interested in entering a substance use disorder treatment program at any level of care at the time of this documentation on 10/18/2022.  The patient first had a concern about having substance abuse issues back in 2010.  The patient denied having any inpatient detoxification admissions or inpatient hospitalizations for withdrawal symptoms.  The patient denied having any history of participating in a substance use disorder treatment program.  The patient had just recently started to attend 12-step support group meetings and he would benefit from continuing to develop his sober peer support network.  The patient had stopped his use  of prescribed and non-prescribed opioid pain medications with Medication Assisted Therapy with Suboxone, but he had been continuing to drink alcohol around 5-days per week on average.  The patient does not appear to be in severe withdrawal, an imminent safety risk to self or others, or requiring immediate medical attention and may proceed with the assessment interview.    Social/Family History:  The patient reported growing up in [place], [place] and after age 11 at [place], [place].  The patient reported being raised by both of his biological parents in the same family home.  The patient reported discipline in his family home was in the form of being grounded, having privileges taken away, verbal reprimands or being spanked.  The patient denied experiencing or witnessing any verbal, physical or sexual abuse when he was growing up in the family home.  The patient reported feeling supported by all of his family members when he was growing up.  The patient reported being raised in the Judaism Jewish.  The patient denied being aware of any family members with a history of substance abuse or with a history of mental health issues.  The patient reported overall his childhood was happy.  The patient described his current relationships with his family of origin as being nonexistent because all of his immediate family members had passed away.      The patient describes his cultural background as being a /White male of Greek decent.  Cultural influences and impact on patient's life structure, values, norms, and healthcare: The patient denied cultural concerns had an impact on life structure, values, norms, or healthcare.  Contextual influences on patient's health include: Community Factors: The patient reported he had first started to drink alcohol and smoke THC/cannabis with his friends/peers in social situations.  The patient identified his preferred language to be English.  The patient reported he does not need the  assistance of an  or other support involved in therapy.  The patient reported he is not currently involved in community of hair activities.  The patient felt his spirituality would likely play some role in his recovery.    The patient reported having no significant delays in developmental tasks.  The patient's highest education level was some college.  The patient identified the following learning problems: none reported.  The patient reports he is able to understand written materials.    The patient denied having any history of being .  The patient identified as being heterosexual and he reported living with his leticia of the past 14 years.  The patient reported having 1 son age 28 and he has not had contact with his son in many years, due to his son's mother moved back to [place] when his son was a young child.     The patient reported living with leticia and their pets in an apartment.  The patient denied having any concerns regarding his immediate living environment and/or neighborhood and he plans to continue to live there.  The patient identified his chelseye and his fitale's parents as being his primary support network at this time.  The patient identified the quality of his relationships with his support network as being good.  The patient would like the following people involved in treatment services if recommended: None at this time.     The patient reported engaging in the following recreational/leisure activities: hiking, camping, fishing and walking the dog.  The patient reported engaging in the following recreation/leisure activities while using alcohol or other non-prescribed mood altering chemicals: he drinks at home after work to help him wind down.  The patient reported the following people are supportive of his recovery: his chelseye and his fitale's parents.    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   The patient reported his income is obtained through employment.  The patient reported having financial stressors at this time, including money being tight from time to time.      The patient denied having any substance related arrests or legal issues.  The patient denied having any history of being on court probation.    Patient's Strengths and Limitations:  The patient identified the following strengths or resources that will help him succeed in treatment: hair / spirituality, family support and work ethic.  Things that may interfere with the patient's success in treatment include: lack of a sober peer support network, financial stressors and socially isolated.     Assessments:  The following assessments were completed by patient for this visit:  PHQ9:   PHQ-9 SCORE 7/21/2022 8/4/2022 8/18/2022 9/6/2022 9/30/2022 10/14/2022 10/18/2022   PHQ-9 Total Score MyChart - - - - - - -   PHQ-9 Total Score 0 1 0 0 0 0 1     GAD7:   ROOPA-7 SCORE 10/21/2021 2/16/2022 4/29/2022 10/18/2022   Total Score 3 (minimal anxiety) 3 (minimal anxiety) 0 (minimal anxiety) -   Total Score 3 3 0 3     PROMIS 10-Global Health (all questions and answers displayed):   PROMIS 10 10/18/2022   In general, would you say your health is: 2   In general, would you say your quality of life is: 3   In general, how would you rate your physical health? 2   In general, how would you rate your mental health, including your mood and your ability to think? 3   In general, how would you rate your satisfaction with your social activities and relationships? 2   In general, please rate how well you carry out your usual social activities and roles. (This includes activities at home, at work and in your community, and responsibilities as a parent, child, spouse, employee, friend, etc.) 3   To what extent are you able to carry out your everyday physical activities such as walking, climbing stairs, carrying groceries, or moving a chair? 5   In the past 7 days, how  often have you been bothered by emotional problems such as feeling anxious, depressed, or irritable? 3   In the past 7 days, how would you rate your fatigue on average? 2   In the past 7 days, how would you rate your pain on average, where 0 means no pain, and 10 means worst imaginable pain? 0   Global Mental Health Score 11   Global Physical Health Score 16   PROMIS TOTAL - SUBSCORES 27   Some recent data might be hidden     Sumner Suicide Severity Rating Scale (Lifetime/Recent)  Sumner Suicide Severity Rating (Lifetime/Recent) 10/18/2022   1. Wish to be Dead (Lifetime) 0   2. Non-Specific Active Suicidal Thoughts (Lifetime) 0   Actual Attempt (Lifetime) 0   Has subject engaged in non-suicidal self-injurious behavior? (Lifetime) 0   Interrupted Attempts (Lifetime) 0   Aborted or Self-Interrupted Attempt (Lifetime) 0   Preparatory Acts or Behavior (Lifetime) 0   Calculated C-SSRS Risk Score (Lifetime/Recent) No Risk Indicated     Personal and Family Medical History:  The patient did not report a family history of mental health concerns.  The patient denied having any family members with a history of substance abuse or mental health issues.      The patient reported the following previous mental health diagnoses: The patient reported a history of Depression NOS and Anxiety disorder NOS.  The patient reported his primary mental health symptoms include: depression, anxiety and symptoms related to past traumatic life events and these do not impact his ability to function.  The patient has received mental health services in the past: The patient reported a history of being prescribed psychotropic medications, but he is not prescribed any psychotropic medications at this time.  The patient denied having any history of working with a 1:1 mental health therapist.  Psychiatric Hospitalizations: None.  The patient denies a history of civil commitment.  Current mental health services/providers include:  The patient  reported a history of being prescribed psychotropic medications, but he is not prescribed any psychotropic medications at this time.  The patient denied having any history of working with a 1:1 mental health therapist.    GAIN-SS Tool:    When was the last time that you had significant problems... 10/18/2022   with feeling very trapped, lonely, sad, blue, depressed or hopeless about the future? Past month   with sleep trouble, such as bad dreams, sleeping restlessly, or falling asleep during the day? Never   with feeling very anxious, nervous, tense, scared, panicked or like something bad was going to happen? Past month   with becoming very distressed & upset when something reminded you of the past? Past month   with thinking about ending your life or committing suicide? Never     When was the last time that you did the following things 2 or more times? 10/18/2022   Lied or conned to get things you wanted or to avoid having to do something? 1+ years ago   Had a hard time paying attention at school, work or home? Never   Had a hard time listening to instructions at school, work or home? Never   Were a bully or threatened other people? Never   Started physical fights with other people? Never     The patient has had a physical exam to rule out medical causes for current symptoms.  Date of last physical exam was within the past year. The patient was encouraged to follow up with PCP if symptoms were to develop.  The patient does not have a Primary Care Provider and was encouraged to establish care with a PCP.  The patient reported the following medical concerns:   Past Medical History:   Diagnosis Date     Chronic neck pain      H/O anxiety disorder      H/O: depression      Opioid use disorder    The patient reported taking his medications as prescribed and following the recommendations of his healthcare providers.  The patient denied having any current issues with pain.  The patient is male and is not pregnant.  There  are not significant appetite / nutritional concerns / weight changes.  The patient does not report having a history of an eating disorder.  The patient does not report a history of head injury / trauma / cognitive impairment.      The patient reported current medications as:   Outpatient Medications Marked as Taking for the 10/18/22 encounter (Hospital Encounter) with [facility]   Medication Sig     buprenorphine-naloxone (SUBOXONE) 2-0.5 MG SUBL sublingual tablet Place 1 tablet under the tongue 2 times daily     cloNIDine (CATAPRES) 0.1 MG tablet Take 1 tablet (0.1 mg) by mouth 3 times daily as needed (anxiety, high bp)     gabapentin (NEURONTIN) 300 MG capsule Take 1 capsule (300 mg) by mouth 2 times daily     topiramate (TOPAMAX) 50 MG tablet Take 1/2 tablet by mouth at bedtime for 1 week, then increase to 1 tablet by mouth once daily     Medication Adherence:  The patient reported taking his prescribed medications as prescribed.    Patient Allergies:    No Known Allergies    Medical History:    Past Medical History:   Diagnosis Date     Chronic neck pain      H/O anxiety disorder      H/O: depression      Opioid use disorder      Substance Use:  The patient reported no family history of chemical health issues.  The patient denied having any inpatient detoxification admissions or inpatient hospitalizations for withdrawal symptoms.  The patient denied having any history of participating in a substance use disorder treatment program.  The patient had just recently started to attend 12-step support group meetings and he would benefit from continuing to develop his sober peer support network.  The patient denied having any history of participating in gambling treatment.  The patient is currently working with the [facility] for Medication Assisted Therapy with Suboxone.      Substance Age of first use Pattern and duration of use (include amounts and frequency) Date of last use      Withdrawal potential Route of use   has used Alcohol 13 The patient reported his heaviest use of alcohol had been during his 20's, when he reported a pattern of drinking between 6-12 beers on a daily basis.    During the past year, he reported a pattern of drinking between 3-4 beers around 5 times per week on average.    The patient reported exceeding the above amounts of alcohol, when he reported drinking up to 12 beers on 2 occasions within the past 12-months.   10/17/22    (3 beers) No Oral   has used Marijuana   13 The patient reported his heaviest use of THC/cannabis had been from his teenage years until the late 1990's, when he reported a pattern of smoking between 2-3 joints of THC/cannabis on a daily basis.   1998 None Smoke   has used Amphetamines          has not used Cocaine/crack    17 The patient reported snorting powder cocaine on 15 occasions in his life between the ages of 17 and 20.   20 None Snort   has used Hallucinogens 18 LSD: 30 times in life. Late 1990's None Oral   has used Inhalants        has used Heroin        has used Other Opiates 30 The patient reported his heaviest use of prescribed opioid pain medications and non-prescribed opioid pain medications had been between the years of 2010 and 2015, when he reported a pattern of using both prescribed and non-prescribed Oxycodone, Morphine and Vicodin on a daily basis.     The patient reported being on Medication Assisted Therapy with Suboxone since 2015 and he denied having any use of non-prescribed opioid pain medications since 2015.   2015 None Oral and snort   has used Benzodiazepine   16 The patient reported using non-prescribed benzodiazepines on a few occasions in his life between the ages of 16 and 17.   17 None Oral   has used Barbiturates        has used Over the counter meds.        has use Caffeine As a young kid The patient reported a current pattern of drinking 1 cup of coffee on a daily basis.   10/18/22 None Oral   has used  Nicotine  16 The patient reported a current pattern of smoking a pack of cigarettes on a daily basis.   10/18/22 None Smoke   has not used other substances not listed above:  Identify:           The patient reported the following problems as a result of their substance use: family problems and relationship problems.  The patient is not concerned about substance use.     The patient reports experiencing the following withdrawal symptoms within the past 12 months: shaky/jittery/tremors and the following within the past 30 days: shaky/jittery/tremors. (DSM-11)  The patient reported having urges to use alcohol and nicotine.  (DSM-4)  The patient reported he has not used more alcohol than intended and over a longer period of time than intended.  (DSM-1)  The patient reported he has had unsuccessful attempts to cut down or control use of nicotine.  (DSM-2)  The patient reported his longest period of abstinence from alcohol within the past 12-months had been for around 7-days and his return to use was because it had never been his long-term goal to abstain from alcohol.  The patient reported he has needed to use more nicotine to achieve the same effect.  (DSM-10)  The patient does report diminished effect with use of same amount of alcohol.  (DSM-10)     The patient does not report a great deal of time is spent in activities necessary to obtain, use, or recover from alcohol effects.  (DSM-3)  The patient does not report important social, occupational, or recreational activities are given up or reduced because of alcohol use.  (DSM-7)  Alcohol use is continued despite knowledge of having a persistent or recurrent physical or psychological problem that is likely to have been caused or exacerbated by use.  (DSM-9)  The patient reported the following problem behaviors while under the influence of substances: The patient reported having relationship conflict with his girlfriend/significant other when under the influence of  alcohol.  (DSM-6)  The patient denied having any recurrent use of alcohol in physically hazardous situations.  (DSM-8)  The patient reported his recovery goal is: The patient wants to reduce his use of alcohol.    The patient does not have other addictive behaviors he is concerned about at this time.  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  (DSM-5)  The patient reported his substance use has not negatively impacted his ability to function in a work setting within the past year.  (DSM-5)  The patient's demographics and history impact his recovery in the following ways: The patient reported he had first started to drink alcohol and smoke THC/cannabis with his friends/peers in social situations.       Dimension Scale Ratings:    Dimension 1 -  Acute Intoxication/Withdrawal: 1 - Minor Problem    Dimension 2 - Biomedical: 1 - Minor Problem    Dimension 3 - Emotional/Behavioral/Cognitive Conditions: 1 - Minor Problem    Dimension 4 - Readiness to Change:  2 - Moderate Problem    Dimension 5 - Relapse/Continued Use/ Continued Problem Potential: 2 - Moderate Problem    Dimension 6 - Recovery Environment:  2 - Moderate Problem    Significant Losses / Trauma / Abuse / Neglect Issues:   The patient did serve in the .  The patient reported being in the [employer] between the ages of 17 and 20.  The patient reported being exposed to combat while in the .  The patient denied having any PTSD symptoms related to his time in the .  There are indications or report of significant loss, trauma, abuse or neglect issues related to: The patient denied having any history of being verbally, emotionally, physically or sexually abused.  The patient reported having significant grief and loss issues regarding the deaths of all of his immediate family members.  The patient denied having any history of suicide attempts  and denied having any current suicide ideation.  The patient denied having any history of self-injurious behavior.     Concerns for possible neglect are not present.    Safety Assessment:   The patient denies current homicidal ideation and behaviors.  The patient denies current self-injurious ideation and behaviors.    The patient reported a risk potential negative medication interactions associated with substance use.  The patient denied any high risk behaviors associated with mental health symptoms.  The patient reported the following current concerns for their personal safety: None.  The patient reported there are not any firearms in the home.     History of Safety Concerns:  The patient denied a history of homicidal ideation.     The patient denied a history of personal safety concerns.    The patient denied a history of assaultive behaviors.    The patient denied having any history of sexual assault behaviors.  The patient denied having any history of being registered as a sex offender.    The patient reported a history of a risk potential negative medication interactions associated with substance use.  The patient denies any history of high risk behaviors associated with mental health symptoms.  The patient reported the following protective factors: spirituality, forward/future oriented thinking, safe and stable environment, adherence with prescribed medication, living with other people and daily obligations.    Risk Plan:  See Recommendations for Safety and Risk Management Plan    Review of Symptoms per patient report:  Substance Use:  family relationship problems due to substance use and cravings/urges to use.     Collateral Contact Summary:   Collateral contacts contributing to this assessment:  The patient's electronic medical records were reviewed at time of assessment.    No additional collateral data had been obtained at the time of this documentation.     If court related records were reviewed,  summarize here: None    Information from collateral contacts supported/largely agreed with information from the client and associated risk ratings.    Information in this assessment was obtained from the medical record and provided by the patient who is a fair historian.        The patient will have open access to his substance use disorder assessment medical record.    Diagnostic Criteria:   2.)  There is persistent desire or unsuccessful efforts to cut down or control use of the substance.  Met for:  nicotine.  4.)  Craving, or a strong desire or urge to use the substance.  Met for:  alcohol and nicotine.  6.)  Continued use of the substance despite having persistent or recurrent social or interpersonal problems caused or exacerbated by the effects of its use.  Met for:  alcohol.  9.)  Use of the substance is continued despite knowledge of having a persistent or recurrent physical or psychological problem that is likely to have been cause or exacerbated by the substance.  Met for:  alcohol.  10.)  Tolerance:  either a need for markedly increased amounts of the substance to achieve the desired effect or a markedly diminished effect with continued use of the dame amount of the substance.  Met for:  nicotine.  11.)  Withdrawal:  either patient endorses characteristic withdrawal syndrome for the substance or the substance (or closely related substance) is taken to relieve or avoid withdrawal symptoms.  Met for:  alcohol and nicotine.    As evidenced by self report and criteria, the patient meets the following DSM-5 Diagnoses: (Sustained by DSM-5 Criteria Listed Above)      1.)  Alcohol Use Disorder Moderate - 303.90 (F10.20)  2.)  History of Opioid Use Disorder Severe - 304.00 (F11.20), in sustained remission  3.)  Tobacco Use Disorder Moderate - 305.10 (F17.200)  4.)  History of Depression NOS, per patient self-report  5.)  History of Anxiety disorder NOS, per patient self-report    Specify if: In early  remission:  After full criteria for alcohol/drug use disorder were previously met, none of the criteria for alcohol/drug use disorder have been met for at least 3 months but for less than 12 months (with the exception that Criterion A4,  Craving or a strong desire or urge to use alcohol/drug  may be met).     In sustained remission:   After full criteria for alcohol use disorder were previously met, none of the criteria for alcohol/drug use disorder have been met at any time during a period of 12 months or longer (with the exception that Criterion A4,  Craving or strong desire or urge to use alcohol/drug  may be met).     Specify if:   This additional specifier is used if the individual is in an environment where access to alcohol is restricted.    Mild: Presence of 2-3 symptoms  Moderate: Presence of 4-5 symptoms  Severe: Presence of 6 or more symptoms    Recommendations:     1. Plan for Safety and Risk Management:     It was recommended the patient call 911 or go to the local Emergency Department should there be any significant change in the above risk factors.            Report to child / adult protection services was NA.    2. LAZARO Referrals:      Recommendations:      1.)  It was recommended the patient abstain from alcohol and from all other non-prescribed mood altering chemicals.   2.)  Follow all of the recommendations of his medical and mental health providers.  3.)  Continue working with the [facility] for Medication Assisted Therapy with Suboxone.  4.)  Enter a virtual or hybrid Evening Outpatient program at one of the [facility] locations for substance use disorder treatment.  5.)  Follow all of the recommendations of his substance use disorder treatment providers.  6.)  Attend 12-step or other recovery support group meetings on a weekly basis.    Clinical Substantiation for the above recommendations: The patient lacks long-term sober maintenance skills, lacks sober coping  skills, lacks awareness regarding the disease model of addiction and lacks a sober peer support network.    The patient reported he was not willing to follow the above recommendations.  The patient reported he was in the process of trying to wean himself off of alcohol, but it was unclear how committed the patient was to stopping his use of alcohol as he did not see his use of alcohol being very problematic for him in his life, other than the risk of having potential negative interactions with his prescribed Suboxone.  The patient reported his plan was to discontinue his use of alcohol, but he was not interested in entering a substance use disorder treatment program at any level of care at the time of this documentation on 10/18/2022.    The patient would like the following family or other support people involved in their treatment:  None at this time    The patient has a history of opioid abuse and was given treatment options, including Medication Assisted Treatment and information on the risks of opioid use disorder including recognizing and responding to opioid overdose.  The patient is currently receiving the following services: The patient is currently working with the [facility] for Medication Assisted Therapy with Suboxone      3.  Mental Health Referrals:     The patient did not appear to be in any need of a mental health referral at this time.    4. Cultural Concerns:    The patient did not identify having any cultural concerns regarding mental health, physical health, or substance use issues.     5. Recommendations for treatment focus:      Alcohol / Substance Use - See #2. LAZARO Referrals above for details on recommendations.    Provider Name/ Credentials:  RAY Vasquez  October 18, 2022

## 2022-10-19 NOTE — PROGRESS NOTES
[facility]  [address] [place] [id number]  10/19/2022    Justus Cope  [address] [place] [address]    [facility] [id number]      Steven,    This is to verify that Justus Cope (1970) participated in a substance use disorder assessment via a video visit with JACKIE Nicole/RAY at [facility] in [place], [place] on 10/18/2022.    Recommendations:  1.)  It was recommended the patient abstain from alcohol and from all other non-prescribed mood altering chemicals.   2.)  Follow all of the recommendations of his medical and mental health providers.  3.)  Continue working with the [facility] for Medication Assisted Therapy with Suboxone.  4.)  Enter a virtual or hybrid [facility] Outpatient program at one of the [facility] locations for substance use disorder treatment.  5.)  Follow all of the recommendations of his substance use disorder treatment providers.  6.)  Attend 12-step or other recovery support group meetings on a weekly basis.    Clinical Substantiation for the above recommendations: The patient lacks long-term sober maintenance skills, lacks sober coping skills, lacks awareness regarding the disease model of addiction and lacks a sober peer support network.    The patient reported he was not willing to follow the above recommendations.  The patient reported he was in the process of trying to wean himself off of alcohol, but it was unclear how committed the patient was to stopping his use of alcohol as he did not see his use of alcohol being very problematic for him in his life, other than the risk of having potential negative interactions with his prescribed Suboxone.  The patient reported his plan was to discontinue his use of alcohol, but he was not interested in entering a substance use disorder treatment program at any level of care at the time of this documentation on 10/18/2022.    If you have any additional questions or  concerns, please feel free to contact me by any of the contact methods listed below.    Sincerely,    JACKIE Billingsley, [facility]  CD [place] [address]  [email or web address]  Tel: [phone]  Fax: [phone]

## 2022-10-20 ENCOUNTER — TELEPHONE (OUTPATIENT)
Dept: BEHAVIORAL HEALTH | Facility: CLINIC | Age: 52
End: 2022-10-20
Payer: COMMERCIAL

## 2022-10-20 DIAGNOSIS — F11.20 OPIOID USE DISORDER, SEVERE, DEPENDENCE (H): Primary | ICD-10-CM

## 2022-10-20 NOTE — TELEPHONE ENCOUNTER
PRC placed telephone recovery support call regarding completion of comprehensive assessment through [facility] earlier this week.    Pt reports assessment was completed and he awaits report recommendations on My Chart.  PRC commneds pt on completion of assessment and welcomes contact regarding questions on recommendations or other recovery related support.     Shashank Vásquez  Peer   [facility]  [phone]    12:10 pm

## 2022-11-01 ENCOUNTER — VIRTUAL VISIT (OUTPATIENT)
Dept: ADDICTION MEDICINE | Facility: CLINIC | Age: 52
End: 2022-11-01
Payer: COMMERCIAL

## 2022-11-01 DIAGNOSIS — F17.200 TOBACCO DEPENDENCE SYNDROME: ICD-10-CM

## 2022-11-01 DIAGNOSIS — F11.20 OPIOID USE DISORDER, SEVERE, DEPENDENCE (H): ICD-10-CM

## 2022-11-01 DIAGNOSIS — F10.20 MODERATE ALCOHOL USE DISORDER (H): ICD-10-CM

## 2022-11-01 DIAGNOSIS — F11.20 UNCOMPLICATED OPIOID DEPENDENCE (H): Primary | ICD-10-CM

## 2022-11-01 DIAGNOSIS — F13.90 BENZODIAZEPINE MISUSE: ICD-10-CM

## 2022-11-01 PROCEDURE — 99214 OFFICE O/P EST MOD 30 MIN: CPT | Mod: 95 | Performed by: FAMILY MEDICINE

## 2022-11-01 RX ORDER — BUPRENORPHINE HYDROCHLORIDE AND NALOXONE HYDROCHLORIDE DIHYDRATE 2; .5 MG/1; MG/1
1 TABLET SUBLINGUAL 2 TIMES DAILY
Qty: 30 TABLET | Refills: 0 | Status: SHIPPED | OUTPATIENT
Start: 2022-11-01 | End: 2022-11-16

## 2022-11-01 NOTE — PROGRESS NOTES
Steven is a 52 year old who is being evaluated via a billable video visit.      How would you like to obtain your AVS? Prateekhart  If the video visit is dropped, the invitation should be resent by: Text to cell phone: [phone]  Will anyone else be joining your video visit? No         [facility]    A/P                                                    ASSESSMENT/PLAN  Diagnoses and all orders for this visit:  Uncomplicated opioid dependence (H)  -     Benzodiazepines, Urine, Quantitative; Future  Moderate alcohol use disorder (H)  Tobacco dependence syndrome  Benzodiazepine misuse  -     Benzodiazepines, Urine, Quantitative; Future  Opioid use disorder, severe, dependence (H)  -     buprenorphine-naloxone (SUBOXONE) 2-0.5 MG SUBL sublingual tablet; Place 1 tablet under the tongue 2 times daily    Orders Placed This Encounter   Medications     buprenorphine-naloxone (SUBOXONE) 2-0.5 MG SUBL sublingual tablet     Sig: Place 1 tablet under the tongue 2 times daily     Dispense:  30 tablet     Refill:  0     NADEAN: [id number]; Please substitute for covered alternative per insurance request.       Problem list updated Nov 1, 2022   No problems updated.      Nov 1, 2022  - Continue suboxone, no changes  - Completed LAZARO eval, but has not followed up with [facility] with placement. Per note, Steven was not interested in placement with a treatment program but today expressed interest and reports he plans to call Ed to setup treatment referrals  - Steven denies any BZD use, but recently he reported no use and was positive on his confirmatory testing  - Indicated he needs to present to a [facility] clinic for urine testing before his next appt, for BZD testing, as today's visit is virtual  - If he does complete both his treatment referral phone call and his lab test, we will have him come for weekly appointments, in person, until he can show consistent negative BZD testing and is actively engaging in treatment  -  Suspect he is largely agreeing to treatment to appease his partner, evidenced by comments he makes to this end during our session  - However, he has shown clear efforts toward alcohol cessation, and has suffered recurrent bouts of alcohol withdrawal in the past, so there is a clear concern and a history of efforts toward improvement      Last encounter A/P  1. Moderate alcohol use disorder (H)  Needs improvement.  He is motivated to make change.  We called and scheduled LAZARO evaluation during visit today for 10/18.  Continue gabapentin.  Reviewed concerns for ongoing use in combination with Suboxone.  Discussed risks of alcohol withdrawal.       2. Benzodiazepine abuse (H)  Discussed recent confirmatory test was positive for diazepam metabolites.  Recommend complete abstinence.  Await results from confirmatory tests today to watch metabolite levels, based on history should be decreasing.  LAZARO eval on 10/18.  Reviewed risks of combining benzodiazepines with buprenorphine and alcohol, including death.  Also discussed risks of benzo withdrawal.  - Benzodiazepines, Urine, Quantitative; Future  - Benzodiazepines, Urine, Quantitative     3. Opioid use disorder, severe, dependence (H)  Stable.  Continue Suboxone, no change.  Advised against use of other sedating substances while on Suboxone and that this could lead to unintentional overdose.    - buprenorphine-naloxone (SUBOXONE) 2-0.5 MG SUBL sublingual tablet; Place 1 tablet under the tongue 2 times daily  Dispense: 36 tablet; Refill: 0      PDMP Review       Value Time User    State PDMP site checked  Yes 10/14/2022  9:19 AM Marylu Dawn DO            RTC  Return in 15 days (on 11/16/2022) for in person, 9am, .      Counseled the patient on the importance of having a recovery program in addition to medication to manage recovery.  Components include avoiding isolating, having willingness to change, avoiding triggers and managing cravings. Encouraged having some  type of sober network and practicing honesty with trusted support person(s). Encouraged other services such as counseling, 12 step or other self-help organizations.      Opioid warning reviewed.  Risk of overdose following a period of abstinence due to decrease tolerance was discussed including risk of death.  Strongly recommended abstain from alcohol, benzodiazepines, THC, opioids and other drugs of abuse.  Increased risk of return to opioid use after use of these substances discussed.  Increased risk of overdose/death with use of other substances particularly benzodiazepines/alcohol reviewed.        SUBJECTIVE                                                    Justus Cope is a 52 year old male who presents to clinic today for follow up    Visit performed Virtual, via video    Video-Visit Details    Type of service:  Video Visit    Video Start Time: 3:35 PM  Video End Time: 4:00 PM    Originating Location (pt. Location): Home    Distant Location (provider location):  [facility] office     Platform used for Video Visit: Pilar        PHQ-9 Score:   PHQ 10/14/2022 10/18/2022 11/1/2022   PHQ-9 Total Score 0 1 0   Q9: Thoughts of better off dead/self-harm past 2 weeks Not at all Not at all Not at all       ROOPA-7 Score:  ROOPA-7 SCORE 2/16/2022 4/29/2022 10/18/2022   Total Score 3 (minimal anxiety) 0 (minimal anxiety) -   Total Score 3 0 3           Recent HPI Details:  HPI Mar 17, 2022   - Wants to continue with treatment; did not realize he was discharged from [facility] but is motivated to enter another treatment center ASAP  - Continues to drink, though trying to stop intermittently  - Using gabapentin/clonidine sparingly  - Reports being unsure about exactly how much he is drinking  - No concerns with suboxone. Pain improved on this  HPI Mar 30, 2022  - Reports no alcohol for 1 week!  - Found a local agency to support recovery, [facility]  - Gabapentin and clonidine more  consistent, and helpful  - Suboxone working well, no cravings or use  HPI Apr 29, 2022  - Reports no alcohol use for 2 weeks!  - Starting to feel more clear-headed  - Back to work after being sick recently; hours have changed some, so he is re-establishing his routine  - Wanting to come Monday for UA to show abstinence  - Has been working with his insurance company to find treatment programs that accept his insurance  - Has been attending AA in the meantime  - Has not been taking clonidine as much, as his BP has been running normal/low  - Using gabapentin in the mornings consistently, and at least 1 per day later in the day. Tries to minimize dose overall; fearful of taking pills often      TODAY'S VISIT  HPI Nov 1, 2022  - Reports no BZD use  - Wants to enter treatment, but has not made steps to enroll  - Reports no change in alcohol use  - No cravings or opioid use  - Consistent suboxone use      OBJECTIVE                                                    PHYSICAL EXAM:  There were no vitals taken for this visit.    GENERAL: healthy, alert and no distress  EYES: Eyes grossly normal to inspection, PERRL and conjunctivae and sclerae normal  RESP: No respiratory distress  MENTAL STATUS EXAM  Appearance/Behavior: No appearant distress  Speech: Normal  Mood/Affect: normal affect  Insight: Limited    LAB  No results found for any visits on 11/01/22.      HISTORY                                                    Problem list reviewed & adjusted, as indicated.  Patient Active Problem List   Diagnosis     Chronic neck pain     Alcohol use disorder, moderate, dependence (H)     Depression with anxiety     Nicotine use disorder     Uncomplicated opioid dependence (H)     Benzodiazepine abuse (H)         MEDICATION LIST (prior to visit)  cloNIDine (CATAPRES) 0.1 MG tablet, Take 1 tablet (0.1 mg) by mouth 3 times daily as needed (anxiety, high bp)  gabapentin (NEURONTIN) 300 MG capsule, Take 1 capsule (300 mg) by mouth 2  times daily  topiramate (TOPAMAX) 50 MG tablet, Take 1/2 tablet by mouth at bedtime for 1 week, then increase to 1 tablet by mouth once daily    No current facility-administered medications on file prior to visit.      MEDICATION LIST (after visit)  Current Outpatient Medications   Medication     buprenorphine-naloxone (SUBOXONE) 2-0.5 MG SUBL sublingual tablet     cloNIDine (CATAPRES) 0.1 MG tablet     gabapentin (NEURONTIN) 300 MG capsule     topiramate (TOPAMAX) 50 MG tablet     No current facility-administered medications for this visit.         No Known Allergies    Additional MDM Details:  none    Jesu Arciniega MD  [facility]  [phone]

## 2022-11-10 ENCOUNTER — TELEPHONE (OUTPATIENT)
Dept: BEHAVIORAL HEALTH | Facility: CLINIC | Age: 52
End: 2022-11-10

## 2022-11-16 ENCOUNTER — OFFICE VISIT (OUTPATIENT)
Dept: BEHAVIORAL HEALTH | Facility: CLINIC | Age: 52
End: 2022-11-16
Payer: COMMERCIAL

## 2022-11-16 VITALS — DIASTOLIC BLOOD PRESSURE: 82 MMHG | HEART RATE: 118 BPM | SYSTOLIC BLOOD PRESSURE: 156 MMHG

## 2022-11-16 DIAGNOSIS — F11.20 UNCOMPLICATED OPIOID DEPENDENCE (H): ICD-10-CM

## 2022-11-16 DIAGNOSIS — F11.20 OPIOID USE DISORDER, SEVERE, DEPENDENCE (H): ICD-10-CM

## 2022-11-16 DIAGNOSIS — F11.90 CHRONIC, CONTINUOUS USE OF OPIOIDS: Primary | ICD-10-CM

## 2022-11-16 DIAGNOSIS — F13.10 BENZODIAZEPINE ABUSE (H): ICD-10-CM

## 2022-11-16 DIAGNOSIS — F11.20 OPIOID USE DISORDER, SEVERE, DEPENDENCE (H): Primary | ICD-10-CM

## 2022-11-16 PROCEDURE — 80346 BENZODIAZEPINES1-12: CPT | Mod: 90 | Performed by: FAMILY MEDICINE

## 2022-11-16 PROCEDURE — 99000 SPECIMEN HANDLING OFFICE-LAB: CPT | Performed by: FAMILY MEDICINE

## 2022-11-16 PROCEDURE — 99214 OFFICE O/P EST MOD 30 MIN: CPT | Performed by: FAMILY MEDICINE

## 2022-11-16 PROCEDURE — H0038 SELF-HELP/PEER SVC PER 15MIN: HCPCS | Mod: U5

## 2022-11-16 RX ORDER — BUPRENORPHINE HYDROCHLORIDE AND NALOXONE HYDROCHLORIDE DIHYDRATE 2; .5 MG/1; MG/1
1 TABLET SUBLINGUAL 2 TIMES DAILY
Qty: 32 TABLET | Refills: 0 | Status: SHIPPED | OUTPATIENT
Start: 2022-11-16 | End: 2022-12-01

## 2022-11-16 ASSESSMENT — PATIENT HEALTH QUESTIONNAIRE - PHQ9: SUM OF ALL RESPONSES TO PHQ QUESTIONS 1-9: 0

## 2022-11-16 NOTE — NURSING NOTE
[facility]      Rooming information:  Approximate last use of full opioid agonist: 2015  Taking buprenorphine? Yes:  As prescribed? Yes:   Number of buprenorphine films/tablets remaining currently: 1  Side effects related to buprenorphine (constipation, dry mouth, sedation?) No   Narcan currently available: Yes  Other recent substance use:    Alcohol   NICOTINE-Yes:   If using nicotine, ready to quit? Yes:     Point of care urine drug screen positive for:  Urine Drug Screen Results  AMP: Negative  BAR: Negative  BUP: Positive  BZO: Positive  SHIRA: Negative  mAMP: Negative  MDMA : Negative  MTD: Negative  [id number]: Negative  OXY: Negative  PCP : Negative  THC : Negative  *POC urine drug screen does not screen for Fentanyl      PHQ Assesment Total Score(s) 11/16/2022   PHQ-9 Score 0   Some recent data might be hidden       If PHQ-9 score of 15 or higher, has Recovery Clinic therapist or provider been notified? N/A    Any current suicidal ideation? No  If yes, has Recovery Clinic therapist or provider been notified? N/A    Primary care provider: Physician No Ref-Primary     Mental health provider: denies (follow up on MH referral if needed)    Insurance needs: active    Housing needs: stable    Contact information up to date? yes    3rd Party Involvement none today (please obtain ROBBIE if pt would like to include)    Jenny Yen CMA  November 16, 2022  9:19 AM

## 2022-11-16 NOTE — PROGRESS NOTES
" [facility] Recovery Clinic Follow Up    ASSESSMENT/PLAN                                                      Diagnoses and all orders for this visit:  Opioid use disorder, severe, dependence (H)  -     buprenorphine-naloxone (SUBOXONE) 2-0.5 MG SUBL sublingual tablet; Place 1 tablet under the tongue 2 times daily  Benzodiazepine abuse (H)  -     Benzodiazepines, Urine, Quantitative; Future      Orders Placed This Encounter   Medications     buprenorphine-naloxone (SUBOXONE) 2-0.5 MG SUBL sublingual tablet     Sig: Place 1 tablet under the tongue 2 times daily     Dispense:  32 tablet     Refill:  0     NADEAN: [id number]; Please substitute for covered alternative per insurance request.     - Suboxone Rx every 2 weeks until he enters treatment  - Steven said he took \"a really tiny piece\" of valium when pressed about the mismatch between his confirmation results, his UDS today, and reports of no BZD when initially asked  - Reports ongoing alcohol use. Notes motivation to enter treatment. Actions have not matched this stated motivation  - Provided # for renewed assessment, as Steven's most recent assessment .      F/up 2 weeks    SUBJECTIVE                                                          CC/HPI:  Justus Cope is a 52 year old male with PMH OUD, AUD, and sedative use disorder who presents to the Recovery Clinic for follow up regarding treatment of opioid use disorder.        22, pt states he plans to follow-up with recommendation to enter treatment as was discussed at our last visit. Denies any specific, acute concerns. Suboxone working well. Continues to use alcohol. Denies BZD use      [place] Prescription Drug Monitoring Program Reviewed:  Yes; as expected    Medications:  buprenorphine-naloxone (SUBOXONE) 2-0.5 MG SUBL sublingual tablet, Place 1 tablet under the tongue 2 times daily  cloNIDine (CATAPRES) 0.1 MG tablet, Take 1 tablet (0.1 mg) by mouth 3 times daily as needed (anxiety, " high bp)  gabapentin (NEURONTIN) 300 MG capsule, Take 1 capsule (300 mg) by mouth 2 times daily  topiramate (TOPAMAX) 50 MG tablet, Take 1/2 tablet by mouth at bedtime for 1 week, then increase to 1 tablet by mouth once daily    No current facility-administered medications on file prior to visit.      No Known Allergies    PMH, PSH, FamHx reviewed    Social History  Housing status: with partner  Employment status: Employed full time  Relationship status: Partnered  Children: no children    OBJECTIVE                                                      BP (!) 156/82   Pulse 118     Physical Exam   NAD. Insight poor. Mood positive    Labs:  Results for orders placed or performed in visit on 11/16/22   Benzodiazepines, Urine, Quantitative     Status: None   Result Value Ref Range    Diazepam, Urine <20 ng/mL    Oxazepam 52 ng/mL    Temazepam, Urn, Quant 96 ng/mL    Nordiazepam, Urn, Quant 26 ng/mL    Chlordiazepoxide, Urn, Quant <20 ng/mL    Lorazepam <20 ng/mL    Alprazolam <5 ng/mL    Alpha-Hydroxytriazolam, Urine <5 ng/mL    Clonazepam, Urine <5 ng/mL    7-aminoclonazepam, Urine <5 ng/mL    Midazolam, Urine <20 ng/mL    Alpha-hydroxymidazolam, Urine <20 ng/mL         UDS:   Point of care urine drug screen positive for:  Urine Drug Screen Results  AMP: Negative  BAR: Negative  BUP: Positive  BZO: Positive  SHIRA: Negative  mAMP: Negative  MDMA : Negative  MTD: Negative  MAC321: Negative  OXY: Negative  PCP : Negative  THC : Negative  *POC urine drug screen does not screen for Fentanyl      No results found for this or any previous visit (from the past 240 hour(s)).      Patient counseling completed today:  Discussed mechanism of action, potential risks/benefits/side effects of medications and other recommendations above.  Recommend pt keep naloxone in their possession and reviewed other aspects of harm reduction to reduce risk of overdose with return to use.   Recommended avoiding concurrent use of alcohol,  benzodiazepines or other sedatives with buprenorphine or other opioids.  Discussed importance of avoiding isolation, building a network of supportive relationships, avoiding people/places/things associated with past use to reduce risk of relapse; including motivational interviewing regarding psychosocial treatment for addiction.     Jeus Arciniega MD    [facility]  [address] [place] [id number] [phone]

## 2022-11-16 NOTE — PROGRESS NOTES
[facility] - Recovery Clinic    Peer   met with Justus Cope in the Recovery Clinic to introduce herself, detail services provided and discuss current status of recovery. Pt appeared alert, oriented and open to feedback during our discussion.     Steven shared that his last use was 2015. Also he drinks alcoholic sometimes.  He will be joining video chat group regarding alcohol use. He explained he works at [employer] and a [employer]. PRS praised him regarding his work  ethics and doing well on his recovery .         PRS provided business card to pt welcoming contact for recovery based support and resources. PRS and pt agree to speak again during an upcoming RC visit.           Service Type:     Individual     Session Start 9:00 Time:                       Session End Time:   9:15     Session Length:             Patient Goal: To utilize suboxone assisted treatment for sobriety and long term recovery.     Goal Progress:   Ongoing.    Key Risk Factors to Recovery:   PRC encourages being aware of risk factors that can lead to re-use which include avoiding isolation, avoiding triggers and managing cravings in a healthy manner. being open and willing to acceptance and change on a daily basis.  PRS encourages pt to establish a sober network calling tree to reach out to when needed.  Continue to practice honesty with ourselves and trusted support person(s).   PRC encourages regular attendance at recovery based meetings as well as finding a sponsor for mentoring and accountability.   PRS encourages consideration of other services such as counseling for mental health issues which can correlate with our substance use.      Support Needs:   Ongoing care, support and resources for opioid substance use disorder.     Follow up:   PRS has provided pt with his contact information for support and resource needs.    PRC and pt agree to meet during an upcoming RC visit.       [facility]  [address], [address]   [place], [place], [address]  Clinic Phone: [phone]  Clinic Fax: [phone]  Peer  phone: [phone]    Open Monday - Friday  9:00am-4:00pm  Walk in hours: 9am-3pm      Yamile Kilpatrick  November 16, 2022  10:02 AM    Doris Taveras MA, [facility] provides clinical  oversite and supervision of care.

## 2022-11-19 LAB
1OH-MIDAZOLAM UR CFM-MCNC: <20 NG/ML
7AMINOCLONAZEPAM UR CFM-MCNC: <5 NG/ML
A-OH ALPRAZ UR CFM-MCNC: <5 NG/ML
ALPRAZ UR CFM-MCNC: <5 NG/ML
CHLORDIAZEP UR CFM-MCNC: <20 NG/ML
CLONAZEPAM UR CFM-MCNC: <5 NG/ML
DIAZEPAM UR CFM-MCNC: <20 NG/ML
LORAZEPAM UR CFM-MCNC: <20 NG/ML
MIDAZOLAM UR CFM-MCNC: <20 NG/ML
NORDIAZEPAM UR CFM-MCNC: 26 NG/ML
OXAZEPAM UR CFM-MCNC: 52 NG/ML
TEMAZEPAM UR CFM-MCNC: 96 NG/ML

## 2022-11-23 ENCOUNTER — TELEPHONE (OUTPATIENT)
Dept: BEHAVIORAL HEALTH | Facility: CLINIC | Age: 52
End: 2022-11-23
Payer: COMMERCIAL

## 2022-11-23 DIAGNOSIS — F11.20 OPIOID USE DISORDER, SEVERE, DEPENDENCE (H): Primary | ICD-10-CM

## 2022-11-23 NOTE — TELEPHONE ENCOUNTER
At the request of provider, PRC placed a telephone recovery support call to pt to encourage the scheduling of SUA through [facility].    PRC received voicemail and left message for pt with [phone] as the SUA scheduling number   PRC welcomed a callback for additional questions, support and resources.    Shashank Vásquez  Peer   Recovery Clinic  Direct dial: [phone]    9:45 am

## 2022-12-01 ENCOUNTER — OFFICE VISIT (OUTPATIENT)
Dept: ADDICTION MEDICINE | Facility: CLINIC | Age: 52
End: 2022-12-01
Payer: COMMERCIAL

## 2022-12-01 ENCOUNTER — LAB (OUTPATIENT)
Dept: LAB | Facility: CLINIC | Age: 52
End: 2022-12-01
Payer: COMMERCIAL

## 2022-12-01 VITALS — DIASTOLIC BLOOD PRESSURE: 93 MMHG | HEART RATE: 99 BPM | SYSTOLIC BLOOD PRESSURE: 152 MMHG

## 2022-12-01 DIAGNOSIS — F13.90 BENZODIAZEPINE MISUSE: ICD-10-CM

## 2022-12-01 DIAGNOSIS — F11.20 UNCOMPLICATED OPIOID DEPENDENCE (H): Primary | ICD-10-CM

## 2022-12-01 DIAGNOSIS — F17.200 TOBACCO DEPENDENCE SYNDROME: ICD-10-CM

## 2022-12-01 DIAGNOSIS — F11.20 OPIOID USE DISORDER, SEVERE, DEPENDENCE (H): ICD-10-CM

## 2022-12-01 DIAGNOSIS — F10.20 MODERATE ALCOHOL USE DISORDER (H): ICD-10-CM

## 2022-12-01 DIAGNOSIS — F11.20 UNCOMPLICATED OPIOID DEPENDENCE (H): ICD-10-CM

## 2022-12-01 LAB
ALBUMIN SERPL BCG-MCNC: 4.2 G/DL (ref 3.5–5.2)
ALP SERPL-CCNC: 113 U/L (ref 40–129)
ALT SERPL W P-5'-P-CCNC: 28 U/L (ref 10–50)
AMPHETAMINES UR QL: NOT DETECTED
ANION GAP SERPL CALCULATED.3IONS-SCNC: 15 MMOL/L (ref 7–15)
AST SERPL W P-5'-P-CCNC: 53 U/L (ref 10–50)
BARBITURATES UR QL SCN: NOT DETECTED
BENZODIAZ UR QL SCN: DETECTED
BILIRUB SERPL-MCNC: 0.2 MG/DL
BUN SERPL-MCNC: 7.8 MG/DL (ref 6–20)
BUPRENORPHINE UR QL: DETECTED
CALCIUM SERPL-MCNC: 9.3 MG/DL (ref 8.6–10)
CANNABINOIDS UR QL: NOT DETECTED
CHLORIDE SERPL-SCNC: 99 MMOL/L (ref 98–107)
COCAINE UR QL SCN: NOT DETECTED
CREAT SERPL-MCNC: 0.65 MG/DL (ref 0.67–1.17)
D-METHAMPHET UR QL: NOT DETECTED
DEPRECATED HCO3 PLAS-SCNC: 24 MMOL/L (ref 22–29)
GFR SERPL CREATININE-BSD FRML MDRD: >90 ML/MIN/1.73M2
GLUCOSE SERPL-MCNC: 88 MG/DL (ref 70–99)
METHADONE UR QL SCN: NOT DETECTED
OPIATES UR QL SCN: NOT DETECTED
OXYCODONE UR QL SCN: NOT DETECTED
PCP UR QL SCN: NOT DETECTED
POTASSIUM SERPL-SCNC: 4.5 MMOL/L (ref 3.4–5.3)
PROPOXYPH UR QL: NOT DETECTED
PROT SERPL-MCNC: 8.1 G/DL (ref 6.4–8.3)
SODIUM SERPL-SCNC: 138 MMOL/L (ref 136–145)
TRICYCLICS UR QL SCN: NOT DETECTED

## 2022-12-01 PROCEDURE — 36415 COLL VENOUS BLD VENIPUNCTURE: CPT

## 2022-12-01 PROCEDURE — 80053 COMPREHEN METABOLIC PANEL: CPT

## 2022-12-01 PROCEDURE — 99000 SPECIMEN HANDLING OFFICE-LAB: CPT

## 2022-12-01 PROCEDURE — 80306 DRUG TEST PRSMV INSTRMNT: CPT

## 2022-12-01 PROCEDURE — 80346 BENZODIAZEPINES1-12: CPT | Mod: 90

## 2022-12-01 PROCEDURE — 99214 OFFICE O/P EST MOD 30 MIN: CPT | Performed by: FAMILY MEDICINE

## 2022-12-01 RX ORDER — BUPRENORPHINE HYDROCHLORIDE AND NALOXONE HYDROCHLORIDE DIHYDRATE 2; .5 MG/1; MG/1
1 TABLET SUBLINGUAL 2 TIMES DAILY
Qty: 30 TABLET | Refills: 0 | Status: SHIPPED | OUTPATIENT
Start: 2022-12-01 | End: 2022-12-15

## 2022-12-01 NOTE — PROGRESS NOTES
"     [facility] Addiction Medicine    A/P                                                    ASSESSMENT/PLAN  Diagnoses and all orders for this visit:  Uncomplicated opioid dependence (H)  -     Comprehensive metabolic panel (BMP + Alb, Alk Phos, ALT, AST, Total. Bili, TP); Future  -     Urine Drugs of Abuse Screen Panel 13; Future  -     Benzodiazepines, Urine, Quantitative; Future  Moderate alcohol use disorder (H)  -     Comprehensive metabolic panel (BMP + Alb, Alk Phos, ALT, AST, Total. Bili, TP); Future  Tobacco dependence syndrome  Benzodiazepine misuse  -     Benzodiazepines, Urine, Quantitative; Future  Opioid use disorder, severe, dependence (H)  -     buprenorphine-naloxone (SUBOXONE) 2-0.5 MG SUBL sublingual tablet; Place 1 tablet under the tongue 2 times daily    Orders Placed This Encounter   Medications     buprenorphine-naloxone (SUBOXONE) 2-0.5 MG SUBL sublingual tablet     Sig: Place 1 tablet under the tongue 2 times daily     Dispense:  30 tablet     Refill:  0     NADEAN: [id number]; Please substitute for covered alternative per insurance request.       Problem list updated Dec 1, 2022   No problems updated.      Dec 1, 2022  - Continues to drink but denies BZD use. Confirmed BZD use at last appt, quant results today should confirm decreased levels  - Appt made for evaluation Monday Dec 5, 8am. Needs to attend IOP given ongoing desire to reduce alcohol use, eliminate BZD use, and despite this has ongoing use  - Will attempt withdrawal treatment at home, with support from his partner, once he enters treatment program and can have support to achieve ongoing abstinence  - CMP to evaluate withdrawal severity potential      Last encounter A/P  - Suboxone Rx every 2 weeks until he enters treatment  - Steven said he took \"a really tiny piece\" of valium when pressed about the mismatch between his confirmation results, his UDS today, and reports of no BZD when initially asked  - Reports ongoing alcohol " use. Notes motivation to enter treatment. Actions have not matched this stated motivation  - Provided # for renewed assessment, as Steven's most recent assessment .      PDMP Review       Value Time User    State PDMP site checked  Yes 2022  9:53 AM Jesu Arciniega MD            RTC  Return in 2 weeks (on 12/15/2022) for 8:30am, in person, double-book ok.      Counseled the patient on the importance of having a recovery program in addition to medication to manage recovery.  Components include avoiding isolating, having willingness to change, avoiding triggers and managing cravings. Encouraged having some type of sober network and practicing honesty with trusted support person(s). Encouraged other services such as counseling, 12 step or other self-help organizations.      Opioid warning reviewed.  Risk of overdose following a period of abstinence due to decrease tolerance was discussed including risk of death.  Strongly recommended abstain from alcohol, benzodiazepines, THC, opioids and other drugs of abuse.  Increased risk of return to opioid use after use of these substances discussed.  Increased risk of overdose/death with use of other substances particularly benzodiazepines/alcohol reviewed.        SUBJECTIVE                                                    Justus Cope is a 52 year old male who presents to clinic today for follow up    Visit performed In Person, face-to-face        Recent HPI Details:  HPI 2022  - Reports no alcohol use for 2 weeks!  - Starting to feel more clear-headed  - Back to work after being sick recently; hours have changed some, so he is re-establishing his routine  - Wanting to come Monday for UA to show abstinence  - Has been working with his insurance company to find treatment programs that accept his insurance  - Has been attending AA in the meantime  - Has not been taking clonidine as much, as his BP has been running normal/low  - Using gabapentin in the  mornings consistently, and at least 1 per day later in the day. Tries to minimize dose overall; fearful of taking pills often  HPI Nov 1, 2022  - Reports no BZD use  - Wants to enter treatment, but has not made steps to enroll  - Reports no change in alcohol use  - No cravings or opioid use  - Consistent suboxone use  HPI 11/16/22  - pt states he plans to follow-up with recommendation to enter treatment as was discussed at our last visit. Denies any specific, acute concerns. Suboxone working well. Continues to use alcohol. Denies BZD use      TODAY'S VISIT  HPI Dec 1, 2022  - Denies any BZD use  - Discussed again his desire to quit alcohol and stop BZD use. He agrees he wants to stop, and is motivated to enter IOP  - No changes in suboxone   - No changes in life otherwise. Partner continues to urge him to start treamtent  - Needs re-evaluation given timeframe since last eval      OBJECTIVE  PHYSICAL EXAM:  BP (!) 152/93   Pulse 99     GENERAL: healthy, alert and no distress  EYES: Eyes grossly normal to inspection, PERRL and conjunctivae and sclerae normal  RESP: No respiratory distress  MENTAL STATUS EXAM  Appearance/Behavior: No appearant distress  Speech: Normal  Mood/Affect: normal affect  Insight: Fair      PHQ-9 Score:   PHQ 11/1/2022 11/16/2022 12/5/2022   PHQ-9 Total Score 0 0 0   Q9: Thoughts of better off dead/self-harm past 2 weeks Not at all Not at all Not at all       ROOPA-7 Score:  ROOPA-7 SCORE 2/16/2022 4/29/2022 10/18/2022   Total Score 3 (minimal anxiety) 0 (minimal anxiety) -   Total Score 3 0 3       LABS (may not contain today's labs)                                                      Last Drug Confirmation panel  Opioids  Lab Results   Component Value Date    BUPR 98 (H) 03/02/2022    BUPRR 490 03/02/2022    NBUPR 41 (H) 03/02/2022    NBUPRR 205 03/02/2022    NXONE 14 (H) 08/25/2021    NXONER 11 08/25/2021     Sedatives/Stimulants/Other  Lab Results   Component Value Date    OXPAM Present (A)  08/25/2021    NDPAM Present (A) 08/25/2021    TPAM Present (A) 08/25/2021       Today's lab data  Results for orders placed or performed in visit on 12/01/22   Comprehensive metabolic panel (BMP + Alb, Alk Phos, ALT, AST, Total. Bili, TP)     Status: Abnormal   Result Value Ref Range    Sodium 138 136 - 145 mmol/L    Potassium 4.5 3.4 - 5.3 mmol/L    Chloride 99 98 - 107 mmol/L    Carbon Dioxide (CO2) 24 22 - 29 mmol/L    Anion Gap 15 7 - 15 mmol/L    Urea Nitrogen 7.8 6.0 - 20.0 mg/dL    Creatinine 0.65 (L) 0.67 - 1.17 mg/dL    Calcium 9.3 8.6 - 10.0 mg/dL    Glucose 88 70 - 99 mg/dL    Alkaline Phosphatase 113 40 - 129 U/L    AST 53 (H) 10 - 50 U/L    ALT 28 10 - 50 U/L    Protein Total 8.1 6.4 - 8.3 g/dL    Albumin 4.2 3.5 - 5.2 g/dL    Bilirubin Total 0.2 <=1.2 mg/dL    GFR Estimate >90 >60 mL/min/1.73m2   Urine Drugs of Abuse Screen Panel 13     Status: Abnormal   Result Value Ref Range    Cannabinoids (95-qak-1-carboxy-9-THC) Not Detected Not Detected, Indeterminate    Phencyclidine Not Detected Not Detected, Indeterminate    Cocaine (Benzoylecgonine) Not Detected Not Detected, Indeterminate    Methamphetamine (d-Methamphetamine) Not Detected Not Detected, Indeterminate    Opiates (Morphine) Not Detected Not Detected, Indeterminate    Amphetamine (d-Amphetamine) Not Detected Not Detected, Indeterminate    Benzodiazepines (Nordiazepam) Detected (A) Not Detected, Indeterminate    Tricyclic Antidepressants (Desipramine) Not Detected Not Detected, Indeterminate    Methadone Not Detected Not Detected, Indeterminate    Barbiturates (Butalbital) Not Detected Not Detected, Indeterminate    Oxycodone Not Detected Not Detected, Indeterminate    Propoxyphene (Norpropoxyphene) Not Detected Not Detected, Indeterminate    Buprenorphine Detected (A) Not Detected, Indeterminate   Benzodiazepines, Urine, Quantitative     Status: None   Result Value Ref Range    Diazepam, Urine <20 ng/mL    Oxazepam <20 ng/mL    Temazepam, Urn,  Quant <20 ng/mL    Nordiazepam, Urn, Quant <20 ng/mL    Chlordiazepoxide, Urn, Quant <20 ng/mL    Lorazepam <20 ng/mL    Alprazolam <5 ng/mL    Alpha-Hydroxytriazolam, Urine <5 ng/mL    Clonazepam, Urine <5 ng/mL    7-aminoclonazepam, Urine <5 ng/mL    Midazolam, Urine <20 ng/mL    Alpha-hydroxymidazolam, Urine <20 ng/mL           HISTORY                                                    Problem list reviewed & adjusted, as indicated.  Patient Active Problem List   Diagnosis     Chronic neck pain     Alcohol use disorder, moderate, dependence (H)     Depression with anxiety     Nicotine use disorder     Uncomplicated opioid dependence (H)     Benzodiazepine abuse (H)         MEDICATION LIST (prior to visit)  cloNIDine (CATAPRES) 0.1 MG tablet, Take 1 tablet (0.1 mg) by mouth 3 times daily as needed (anxiety, high bp)  gabapentin (NEURONTIN) 300 MG capsule, Take 1 capsule (300 mg) by mouth 2 times daily  topiramate (TOPAMAX) 50 MG tablet, Take 1/2 tablet by mouth at bedtime for 1 week, then increase to 1 tablet by mouth once daily    No current facility-administered medications on file prior to visit.      MEDICATION LIST (after visit)  Current Outpatient Medications   Medication     buprenorphine-naloxone (SUBOXONE) 2-0.5 MG SUBL sublingual tablet     cloNIDine (CATAPRES) 0.1 MG tablet     gabapentin (NEURONTIN) 300 MG capsule     topiramate (TOPAMAX) 50 MG tablet     buprenorphine-naloxone (SUBOXONE) 2-0.5 MG SUBL sublingual tablet     No current facility-administered medications for this visit.         No Known Allergies    Additional MDM Details:  none    Jesu Arciniega MD  [facility]  [phone]

## 2022-12-01 NOTE — NURSING NOTE
[facility] - Addiction Medicine       Rooming information:    Point of care urine drug screen positive for: n/a     *POC urine drug screen does not screen for Fentanyl    PHQ-9 Scores:   PHQ 10/18/2022 11/1/2022 11/16/2022   PHQ-9 Total Score 1 0 0   Q9: Thoughts of better off dead/self-harm past 2 weeks Not at all Not at all Not at all     ROOPA-7 Scores:  ROOPA-7 SCORE 2/16/2022 4/29/2022 10/18/2022   Total Score 3 (minimal anxiety) 0 (minimal anxiety) -   Total Score 3 0 3       Any other recent substance use:     Alcohol     NICOTINE-Yes:   If using nicotine, ready to quit? No    Side effects related to medications pt would like to discuss with provider (constipation, dry mouth, HA, GI upset, sedation?) No     Narcan currently available: Yes    Primary care provider: Physician No Ref-Primary     Mental health provider: denies (follow up on MH referral if needed)    Any housing, insurance deficits?: stable    Contact information up to date? yes    3rd Party Involvement not at this time (please obtain ROBBIE if pt would like to include)      Jennifer Manzo MA  December 1, 2022  9:25 AM

## 2022-12-04 LAB
1OH-MIDAZOLAM UR CFM-MCNC: <20 NG/ML
7AMINOCLONAZEPAM UR CFM-MCNC: <5 NG/ML
A-OH ALPRAZ UR CFM-MCNC: <5 NG/ML
ALPRAZ UR CFM-MCNC: <5 NG/ML
CHLORDIAZEP UR CFM-MCNC: <20 NG/ML
CLONAZEPAM UR CFM-MCNC: <5 NG/ML
DIAZEPAM UR CFM-MCNC: <20 NG/ML
LORAZEPAM UR CFM-MCNC: <20 NG/ML
MIDAZOLAM UR CFM-MCNC: <20 NG/ML
NORDIAZEPAM UR CFM-MCNC: <20 NG/ML
OXAZEPAM UR CFM-MCNC: <20 NG/ML
TEMAZEPAM UR CFM-MCNC: <20 NG/ML

## 2022-12-05 ENCOUNTER — HOSPITAL ENCOUNTER (OUTPATIENT)
Dept: BEHAVIORAL HEALTH | Facility: CLINIC | Age: 52
Discharge: HOME | End: 2022-12-05
Attending: FAMILY MEDICINE | Admitting: FAMILY MEDICINE
Payer: COMMERCIAL

## 2022-12-05 VITALS — HEIGHT: 68 IN | WEIGHT: 110 LBS | BODY MASS INDEX: 16.67 KG/M2

## 2022-12-05 PROCEDURE — H0001 ALCOHOL AND/OR DRUG ASSESS: HCPCS | Mod: GT

## 2022-12-05 RX ORDER — BUPRENORPHINE HYDROCHLORIDE AND NALOXONE HYDROCHLORIDE DIHYDRATE 2; .5 MG/1; MG/1
TABLET SUBLINGUAL
COMMUNITY
Start: 2021-07-07 | End: 2023-02-21

## 2022-12-05 ASSESSMENT — COLUMBIA-SUICIDE SEVERITY RATING SCALE - C-SSRS
TOTAL  NUMBER OF ABORTED OR SELF INTERRUPTED ATTEMPTS LIFETIME: NO
TOTAL  NUMBER OF INTERRUPTED ATTEMPTS LIFETIME: NO
6. HAVE YOU EVER DONE ANYTHING, STARTED TO DO ANYTHING, OR PREPARED TO DO ANYTHING TO END YOUR LIFE?: NO
1. HAVE YOU WISHED YOU WERE DEAD OR WISHED YOU COULD GO TO SLEEP AND NOT WAKE UP?: NO
ATTEMPT LIFETIME: NO
2. HAVE YOU ACTUALLY HAD ANY THOUGHTS OF KILLING YOURSELF?: NO

## 2022-12-05 ASSESSMENT — PAIN SCALES - GENERAL: PAINLEVEL: MODERATE PAIN (4)

## 2022-12-05 ASSESSMENT — PATIENT HEALTH QUESTIONNAIRE - PHQ9: SUM OF ALL RESPONSES TO PHQ QUESTIONS 1-9: 0

## 2022-12-05 NOTE — PROGRESS NOTES
"    [facility]  Provider Name:  JACKIE Vasquez/[facility]     Telephone: [phone]      PATIENT'S NAME: Justus Cope  PREFERRED NAME: Steven  PRONOUNS: he/him/his    MRN: [id number]  : 1970   ACCT. NUMBER:  [id number]  DATE OF SERVICE: 2022  START TIME: 8:00 am  END TIME: 8:40 am  E-MAIL: [email or web address]   PREFERRED PHONE: [phone]   May we leave a program related message: Yes  ADDRESS:   [address] [place] [address] [address] COHEN [address] [place] [id number]  SERVICE MODALITY:  Telephone Visit:        Provider verified identity through the following two step process.  Patient provided:  Patient  (1970) and Patient's last 4 digits of SSN ([id number])    The patient has been notified of the following:      \"We have found that certain health care needs can be provided without the need for a face to face visit.  This service lets us provide the care you need with a phone conversation.       I will have full access to your [facility] medical record during this entire phone call.   I will be taking notes for your medical record.      Since this is like an office visit, we will bill your insurance company for this service.       There are potential benefits and risks of telephone visits (e.g. limits to patient confidentiality) that differ from in-person visits.?Confidentiality still applies for telephone services, and nobody will record the visit.  It is important to be in a quiet, private space that is free of distractions (including cell phone or other devices) during the visit.??      If during the course of the call I believe a telephone visit is not appropriate, you will not be charged for this service\"     Consent has been obtained for this service by care team member: Yes     EMERGENCY CONTACT:   Lissy Astorga (girlfriend/significant other)  Tel #: [phone]    UNIVERSAL ADULT SUBSTANCE USE DISORDER DIAGNOSTIC ASSESSMENT    Identifying " Information:  The patient is a 52 year old, /White male of Mong[place] decent.  The pronoun use throughout this assessment reflects the patient's chosen pronoun.  The patient was referred for an assessment by the [facility].  The patient attended the session alone and with his girlfriend/significant other in the background.     Chief Complaint:   The reason for seeking services at this time is:  The patient reported the reason for participating in the substance use disorder assessment today on 12/5/2022 was due to the patient's own awareness he needed help and due to pressure from his Medication Assisted Therapy providers for him to get help.  The patient had a prior substance use disorder assessment with this counselor on 10/18/2022 and entering an intensive outpatient substance use disorder treatment program had been recommended for him, but was not interested in entering a substance use disorder treatment program at any level of care at that time.  The patient's current chemical of choice is alcohol, but he had a remote history of abusing opioid pain medications.  The patient had been unsuccessful in his attempts to stop his use of alcohol on his own and he was requesting a referral to enter a virtual or hybrid Day Outpatient program at one of the [facility] locations for co-occurring mental health and substance use disorder treatment at this time.  The patient first had a concern about having substance abuse issues back in 2010.  The patient denied having any inpatient detoxification admissions or inpatient hospitalizations for withdrawal symptoms.  The patient denied having any history of participating in a substance use disorder treatment program.  The patient had just recently started to attend 12-step support group meetings and he would benefit from continuing to develop his sober peer support network.  The patient had stopped his use of prescribed and non-prescribed opioid  pain medications with Medication Assisted Therapy with Suboxone, but he had been continuing to drink alcohol around 5-days per week on average.  The patient is currently receiving the following services: The patient is currently working with the [facility] for Medication Assisted Therapy with Suboxone.   The patient does not appear to be in severe withdrawal, an imminent safety risk to self or others, or requiring immediate medical attention and may proceed with the assessment interview.    From his 10/18/2022 substance use disorder assessment:  The patient had an assessment via a remote video visit at [facility] on 10/18/2022 with this counselor for evaluation of a possible substance use disorder.  The reason for the substance use disorder assessment was because he was referred to complete a substance use disorder assessment by his Medication Assisted Therapy providers at the [facility].  The patient reported he had been on Medication Assisted Therapy with Suboxone with the [facility] for the past 2-3 years and he had been continuing to drink alcohol and had consistently tested positive for alcohol with his PEth testing.  It also appeared there was a recent drug screen which had come back as positive for benzodiazepine metabolites, but the patient denied having any knowledge of using any benzodiazepines within the past 12-months.  The patient reported he was in the process of trying to wean himself off of alcohol, but it was unclear how committed the patient was to stopping his use of alcohol as he did not see his use of alcohol being very problematic for him in his life, other than the risk of having potential negative interactions with his prescribed Suboxone.  The patient reported his plan was to discontinue his use of alcohol, but he was not interested in entering a substance use disorder treatment program at any level of care at the  time of this documentation on 10/18/2022.  The patient first had a concern about having substance abuse issues back in 2010.  The patient denied having any inpatient detoxification admissions or inpatient hospitalizations for withdrawal symptoms.  The patient denied having any history of participating in a substance use disorder treatment program.  The patient had just recently started to attend 12-step support group meetings and he would benefit from continuing to develop his sober peer support network.  The patient had stopped his use of prescribed and non-prescribed opioid pain medications with Medication Assisted Therapy with Suboxone, but he had been continuing to drink alcohol around 5-days per week on average.  The patient does not appear to be in severe withdrawal, an imminent safety risk to self or others, or requiring immediate medical attention and may proceed with the assessment interview.    Social/Family History:  The patient reported growing up in [place], [place] and after age 11 at [place], [place].  The patient reported being raised by both of his biological parents in the same family home.  The patient reported discipline in his family home was in the form of being grounded, having privileges taken away, verbal reprimands or being spanked.  The patient denied experiencing or witnessing any verbal, physical or sexual abuse when he was growing up in the family home.  The patient reported feeling supported by all of his family members when he was growing up.  The patient reported being raised in the Church [employer].  The patient denied being aware of any family members with a history of substance abuse or with a history of mental health issues.  The patient reported overall his childhood was happy.  The patient described his current relationships with his family of origin as being nonexistent because all of his immediate family members had passed away.      The patient describes his cultural background as  being a /White male of [place] decent.  Cultural influences and impact on patient's life structure, values, norms, and healthcare: The patient denied cultural concerns had an impact on life structure, values, norms, or healthcare.  Contextual influences on patient's health include: Community Factors: The patient reported he had first started to drink alcohol and smoke THC/cannabis with his friends/peers in social situations.  The patient identified his preferred language to be English.  The patient reported he does not need the assistance of an  or other support involved in therapy.  The patient reported he is not currently involved in community of hair activities.  The patient felt his spirituality would likely play some role in his recovery.    The patient reported having no significant delays in developmental tasks.  The patient's highest education level was some college.  The patient identified the following learning problems: none reported.  The patient reports he is able to understand written materials.    The patient denied having any history of being .  The patient identified as being heterosexual and he reported living with his leticia of the past 14 years.  The patient reported having 1 son age 29 and he has not had contact with his son in many years, due to his son's mother moved back to [place] when his son was a young child.     The patient reported living with leticia and their pets (1 dog and 1 cat) in an apartment.  The patient denied having any concerns regarding his immediate living environment and/or neighborhood and he plans to continue to live there.  The patient identified his chelseye and his fitale's parents as being his primary support network at this time.  The patient identified the quality of his relationships with his support network as being good.  The patient would like the following people involved in treatment services if recommended: None at this time.      The patient reported engaging in the following recreational/leisure activities: hiking, camping, fishing and walking the dog.  The patient reported engaging in the following recreation/leisure activities while using alcohol or other non-prescribed mood altering chemicals: he drinks at home after work to help him wind down.  The patient reported the following people are supportive of his recovery: his chelseye and his chelseye's parents.    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  The patient reported his income is obtained through employment.  The patient reported having financial stressors at this time, including money being tight from time to time.      The patient denied having any substance related arrests or legal issues.  The patient denied having any history of being on court probation.    Patient's Strengths and Limitations:  The patient identified the following strengths or resources that will help him succeed in treatment: hair / spirituality, family support and work ethic.  Things that may interfere with the patient's success in treatment include: lack of a sober peer support network, financial stressors and socially isolated.     Assessments:  The following assessments were completed by patient for this visit:  PHQ9:   PHQ-9 SCORE 9/6/2022 9/30/2022 10/14/2022 10/18/2022 11/1/2022 11/16/2022 12/5/2022   PHQ-9 Total Score MyChart - - - - 0 - -   PHQ-9 Total Score 0 0 0 1 0 0 0     GAD2:   ROOPA-2 11/1/2022 12/5/2022   Feeling nervous, anxious, or on edge 0 0   Not being able to stop or control worrying 0 0   ROOPA-2 Total Score 0 0     PROMIS 10-Global Health (all questions and answers displayed):   PROMIS 10 10/18/2022 12/5/2022   In general, would you say your health is: 2 3   In general, would you say your quality of life is: 3 3   In general, how would you rate your physical health? 2 4   In general, how would you rate your  mental health, including your mood and your ability to think? 3 3   In general, how would you rate your satisfaction with your social activities and relationships? 2 3   In general, please rate how well you carry out your usual social activities and roles. (This includes activities at home, at work and in your community, and responsibilities as a parent, child, spouse, employee, friend, etc.) 3 4   To what extent are you able to carry out your everyday physical activities such as walking, climbing stairs, carrying groceries, or moving a chair? 5 4   In the past 7 days, how often have you been bothered by emotional problems such as feeling anxious, depressed, or irritable? 3 3   In the past 7 days, how would you rate your fatigue on average? 2 2   In the past 7 days, how would you rate your pain on average, where 0 means no pain, and 10 means worst imaginable pain? 0 4   Global Mental Health Score 11 12   Global Physical Health Score 16 15   PROMIS TOTAL - SUBSCORES 27 27   Some recent data might be hidden     Catlett Suicide Severity Rating Scale (Lifetime/Recent)  Catlett Suicide Severity Rating (Lifetime/Recent) 10/18/2022 12/5/2022   1. Wish to be Dead (Lifetime) 0 0   2. Non-Specific Active Suicidal Thoughts (Lifetime) 0 0   Actual Attempt (Lifetime) 0 0   Has subject engaged in non-suicidal self-injurious behavior? (Lifetime) 0 0   Interrupted Attempts (Lifetime) 0 0   Aborted or Self-Interrupted Attempt (Lifetime) 0 0   Preparatory Acts or Behavior (Lifetime) 0 0   Calculated C-SSRS Risk Score (Lifetime/Recent) No Risk Indicated No Risk Indicated     GAIN-sliding scale:  When was the last time that you had significant problems... 10/18/2022 12/5/2022   with feeling very trapped, lonely, sad, blue, depressed or hopeless about the future? Past month 2 to 12 months ago   with sleep trouble, such as bad dreams, sleeping restlessly, or falling asleep during the day? Never Never   with feeling very anxious,  nervous, tense, scared, panicked or like something bad was going to happen? Past month 2 to 12 months ago   with becoming very distressed & upset when something reminded you of the past? Past month Past month   with thinking about ending your life or committing suicide? Never Never      When was the last time that you did the following things 2 or more times? 10/18/2022 12/5/2022   Lied or conned to get things you wanted or to avoid having to do something? 1+ years ago 2 to 12 months ago   Had a hard time paying attention at school, work or home? Never Never   Had a hard time listening to instructions at school, work or home? Never Never   Were a bully or threatened other people? Never Never   Started physical fights with other people? Never Never       Personal and Family Medical History:  The patient did not report a family history of mental health concerns.  The patient denied having any family members with a history of substance abuse or mental health issues.      The patient reported the following previous mental health diagnoses: The patient reported a history of Depression NOS and Anxiety disorder NOS.  The patient reported his primary mental health symptoms include: depression, anxiety and symptoms related to past traumatic life events and these do not impact his ability to function.  The patient has received mental health services in the past: The patient reported a history of being prescribed psychotropic medications, but he is not prescribed any psychotropic medications at this time.  The patient denied having any history of working with a 1:1 mental health therapist.  Psychiatric Hospitalizations: None.  The patient denies a history of civil commitment.  Current mental health services/providers include:  The patient reported a history of being prescribed psychotropic medications, but he is not prescribed any psychotropic medications at this time.  The patient denied having any history of working with a  1:1 mental health therapist.    GAIN-SS Tool:    When was the last time that you had significant problems... 10/18/2022   with feeling very trapped, lonely, sad, blue, depressed or hopeless about the future? Past month   with sleep trouble, such as bad dreams, sleeping restlessly, or falling asleep during the day? Never   with feeling very anxious, nervous, tense, scared, panicked or like something bad was going to happen? Past month   with becoming very distressed & upset when something reminded you of the past? Past month   with thinking about ending your life or committing suicide? Never     When was the last time that you did the following things 2 or more times? 10/18/2022   Lied or conned to get things you wanted or to avoid having to do something? 1+ years ago   Had a hard time paying attention at school, work or home? Never   Had a hard time listening to instructions at school, work or home? Never   Were a bully or threatened other people? Never   Started physical fights with other people? Never     The patient has had a physical exam to rule out medical causes for current symptoms.  Date of last physical exam was within the past year. The patient was encouraged to follow up with PCP if symptoms were to develop.  The patient does not have a Primary Care Provider and was encouraged to establish care with a PCP.  The patient reported the following medical concerns:   Past Medical History:   Diagnosis Date     Childhood asthma      Chronic neck pain      H/O anxiety disorder      H/O: depression      Opioid use disorder      The patient reported taking his medications as prescribed and following the recommendations of his healthcare providers.  The patient denied having any current issues with pain.  The patient is male and is not pregnant.  There are not significant appetite / nutritional concerns / weight changes.  The patient does not report having a history of an eating disorder.  The patient does not  report a history of head injury / trauma / cognitive impairment.      The patient reported current medications as:   The patient reported current medications as:   Outpatient Medications Marked as Taking for the 12/5/22 encounter (Hospital Encounter) with Jose Mulligan LADC   Medication Sig     buprenorphine-naloxone (SUBOXONE) 2-0.5 MG SUBL sublingual tablet place one TABLET UNDER THE TONGUE TWICE DAILY     buprenorphine-naloxone (SUBOXONE) 2-0.5 MG SUBL sublingual tablet Place 1 tablet under the tongue 2 times daily     cloNIDine (CATAPRES) 0.1 MG tablet Take 1 tablet (0.1 mg) by mouth 3 times daily as needed (anxiety, high bp)     gabapentin (NEURONTIN) 300 MG capsule Take 1 capsule (300 mg) by mouth 2 times daily     topiramate (TOPAMAX) 50 MG tablet Take 1/2 tablet by mouth at bedtime for 1 week, then increase to 1 tablet by mouth once daily     Medication Adherence:  The patient reported taking his prescribed medications as prescribed.    Patient Allergies:    No Known Allergies    Medical History:    Past Medical History:   Diagnosis Date     Chronic neck pain      H/O anxiety disorder      H/O: depression      Opioid use disorder      Substance Use:  The patient reported no family history of chemical health issues.  The patient denied having any inpatient detoxification admissions or inpatient hospitalizations for withdrawal symptoms.  The patient denied having any history of participating in a substance use disorder treatment program.  The patient had just recently started to attend 12-step support group meetings and he would benefit from continuing to develop his sober peer support network.  The patient had stopped his use of prescribed and non-prescribed opioid pain medications with Medication Assisted Therapy with Suboxone, but he had been continuing to drink alcohol around 5-days per week on average.  The patient is currently receiving the following services: The patient is currently working with  the [facility] for Medication Assisted Therapy with Suboxone.      Substance Age of first use Pattern and duration of use (include amounts and frequency) Date of last use     Withdrawal potential Route of use   has used Alcohol 13 The patient reported his heaviest use of alcohol had been during his 20's, when he reported a pattern of drinking between 6-12 beers on a daily basis.    During the past year prior to 10/18/2022, he reported a pattern of drinking between 3-4 beers around 5 times per week on average.    The patient reported exceeding the above amounts of alcohol, when he reported drinking up to 12 beers on 2 occasions within the past 12-months prior to 10/18/2022.    Since his last substance use disorder assessment with this counselor on 10/18/2022, he reported a pattern of drinking between 3-4 beers 3-4 times per week on average.    The patient reported exceeding the above amounts of alcohol since 10/18/2022, when he reported drinking up to 6 beers on 4-5 occasions.   12/3/2022    (3-4 beers) No Oral   has used Marijuana   13 The patient reported his heaviest use of THC/cannabis had been from his teenage years until the late 1990's, when he reported a pattern of smoking between 2-3 joints of THC/cannabis on a daily basis.   1998 None Smoke   has used Amphetamines          has not used Cocaine/crack    17 The patient reported snorting powder cocaine on 15 occasions in his life between the ages of 17 and 20.   20 None Snort   has used Hallucinogens 18 LSD: 30 times in life. Late 1990's None Oral   has used Inhalants        has used Heroin        has used Other Opiates 30 The patient reported his heaviest use of prescribed opioid pain medications and non-prescribed opioid pain medications had been between the years of 2010 and 2015, when he reported a pattern of using both prescribed and non-prescribed Oxycodone, Morphine and Vicodin on a daily basis.     The patient reported being on  Medication Assisted Therapy with Suboxone since 2015 and he denied having any use of non-prescribed opioid pain medications since 2015.   2015 None Oral and snort   has used Benzodiazepine   16 The patient reported using non-prescribed benzodiazepines on a few occasions in his life between the ages of 16 and 17.   17 None Oral   has used Barbiturates        has used Over the counter meds.        has use Caffeine As a young kid The patient reported a current pattern of drinking 1 cup of coffee on a daily basis.   12/5/2022 None Oral   has used Nicotine  16 The patient reported a current pattern of smoking a pack of cigarettes on a daily basis.   12/5/2022 None Smoke   has not used other substances not listed above:  Identify:           The patient reported the following problems as a result of their substance use: family problems and relationship problems.  The patient is not concerned about substance use.     The patient reports experiencing the following withdrawal symptoms within the past 12 months: shaky/jittery/tremors and the following within the past 30 days: shaky/jittery/tremors. (DSM-11)  The patient reported having urges to use alcohol and nicotine.  (DSM-4)  The patient reported he has not used more alcohol than intended and over a longer period of time than intended.  (DSM-1)  The patient reported he has had unsuccessful attempts to cut down or control use of nicotine.  (DSM-2)  The patient reported his longest period of abstinence from alcohol within the past 12-months had been for around 7-days and his return to use was because it had never been his long-term goal to abstain from alcohol.  The patient reported he has needed to use more nicotine to achieve the same effect.  (DSM-10)  The patient does report diminished effect with use of same amount of alcohol.  (DSM-10)     The patient does not report a great deal of time is spent in activities necessary to obtain, use, or recover from alcohol effects.   (DSM-3)  The patient does not report important social, occupational, or recreational activities are given up or reduced because of alcohol use.  (DSM-7)  Alcohol use is continued despite knowledge of having a persistent or recurrent physical or psychological problem that is likely to have been caused or exacerbated by use.  (DSM-9)  The patient reported the following problem behaviors while under the influence of substances: The patient reported having relationship conflict with his girlfriend/significant other when under the influence of alcohol.  (DSM-6)  The patient denied having any recurrent use of alcohol in physically hazardous situations.  (DSM-8)  The patient reported his recovery goal is: The patient wants to reduce his use of alcohol.    The patient does not have other addictive behaviors he is concerned about at this time.  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  (DSM-5)  The patient reported his substance use has not negatively impacted his ability to function in a work setting within the past year.  (DSM-5)  The patient's demographics and history impact his recovery in the following ways: The patient reported he had first started to drink alcohol and smoke THC/cannabis with his friends/peers in social situations.       Dimension Scale Ratings:    Dimension 1 -  Acute Intoxication/Withdrawal: 1 - Minor Problem    Dimension 2 - Biomedical: 1 - Minor Problem    Dimension 3 - Emotional/Behavioral/Cognitive Conditions: 1 - Minor Problem    Dimension 4 - Readiness to Change:  2 - Moderate Problem    Dimension 5 - Relapse/Continued Use/ Continued Problem Potential: 2 - Moderate Problem    Dimension 6 - Recovery Environment:  2 - Moderate Problem    Significant Losses / Trauma / Abuse / Neglect Issues:   The patient did serve in the .  The patient reported being in the [employer] between the ages  of 17 and 20.  The patient reported being exposed to combat while in the .  The patient denied having any PTSD symptoms related to his time in the .  There are indications or report of significant loss, trauma, abuse or neglect issues related to: The patient denied having any history of being verbally, emotionally, physically or sexually abused.  The patient reported having significant grief and loss issues regarding the deaths of all of his immediate family members.  The patient denied having any history of suicide attempts and denied having any current suicide ideation.  The patient denied having any history of self-injurious behavior.     Concerns for possible neglect are not present.    Safety Assessment:   The patient denies current homicidal ideation and behaviors.  The patient denies current self-injurious ideation and behaviors.    The patient reported a risk potential negative medication interactions associated with substance use.  The patient denied any high risk behaviors associated with mental health symptoms.  The patient reported the following current concerns for their personal safety: None.  The patient reported there are not any firearms in the home.     History of Safety Concerns:  The patient denied a history of homicidal ideation.     The patient denied a history of personal safety concerns.    The patient denied a history of assaultive behaviors.    The patient denied having any history of sexual assault behaviors.  The patient denied having any history of being registered as a sex offender.    The patient reported a history of a risk potential negative medication interactions associated with substance use.  The patient denies any history of high risk behaviors associated with mental health symptoms.  The patient reported the following protective factors: spirituality, forward/future oriented thinking, safe and stable environment, adherence with prescribed medication, living with  other people and daily obligations.    Risk Plan:  See Recommendations for Safety and Risk Management Plan    Review of Symptoms per patient report:  Substance Use:  family relationship problems due to substance use and cravings/urges to use.     Collateral Contact Summary:   Collateral contacts contributing to this assessment:  The patient's electronic medical records were reviewed at time of assessment.    No additional collateral data had been obtained at the time of this documentation.     If court related records were reviewed, summarize here: None    Information from collateral contacts supported/largely agreed with information from the client and associated risk ratings.    Information in this assessment was obtained from the medical record and provided by the patient who is a fair historian.        The patient will have open access to his substance use disorder assessment medical record.    Diagnostic Criteria:   2.)  There is persistent desire or unsuccessful efforts to cut down or control use of the substance.  Met for:  nicotine.  4.)  Craving, or a strong desire or urge to use the substance.  Met for:  alcohol and nicotine.  6.)  Continued use of the substance despite having persistent or recurrent social or interpersonal problems caused or exacerbated by the effects of its use.  Met for:  alcohol.  9.)  Use of the substance is continued despite knowledge of having a persistent or recurrent physical or psychological problem that is likely to have been cause or exacerbated by the substance.  Met for:  alcohol.  10.)  Tolerance:  either a need for markedly increased amounts of the substance to achieve the desired effect or a markedly diminished effect with continued use of the dame amount of the substance.  Met for:  nicotine.  11.)  Withdrawal:  either patient endorses characteristic withdrawal syndrome for the substance or the substance (or closely related substance) is taken to relieve or avoid  withdrawal symptoms.  Met for:  alcohol and nicotine.    As evidenced by self report and criteria, the patient meets the following DSM-5 Diagnoses: (Sustained by DSM-5 Criteria Listed Above)      1.)  Alcohol Use Disorder Moderate - 303.90 (F10.20)  2.)  History of Opioid Use Disorder Severe - 304.00 (F11.20), in sustained remission  3.)  Tobacco Use Disorder Moderate - 305.10 (F17.200)  4.)  History of Depression NOS, per patient self-report  5.)  History of Anxiety disorder NOS, per patient self-report    Specify if: In early remission:  After full criteria for alcohol/drug use disorder were previously met, none of the criteria for alcohol/drug use disorder have been met for at least 3 months but for less than 12 months (with the exception that Criterion A4,  Craving or a strong desire or urge to use alcohol/drug  may be met).     In sustained remission:   After full criteria for alcohol use disorder were previously met, none of the criteria for alcohol/drug use disorder have been met at any time during a period of 12 months or longer (with the exception that Criterion A4,  Craving or strong desire or urge to use alcohol/drug  may be met).     Specify if:   This additional specifier is used if the individual is in an environment where access to alcohol is restricted.    Mild: Presence of 2-3 symptoms  Moderate: Presence of 4-5 symptoms  Severe: Presence of 6 or more symptoms    Recommendations:     1. Plan for Safety and Risk Management:     It was recommended the patient call 911 or go to the local Emergency Department should there be any significant change in the above risk factors.            Report to child / adult protection services was NA.    2. LAZARO Referrals:      Recommendations:      1.)  It was recommended the patient abstain from alcohol and from all other non-prescribed mood altering chemicals.   2.)  Follow all of the recommendations of his medical and mental health providers.  3.)  Continue working  with the [facility] for Medication Assisted Therapy with Suboxone.  4.)  Enter the virtual Day Outpatient program at [facility] in [place], [place] for co-occurring mental health and substance use disorder treatment.  5.)  Follow all of the recommendations of his substance use disorder treatment providers.  6.)  Attend 12-step or other recovery support group meetings on a weekly basis.    Clinical Substantiation for the above recommendations: The patient lacks long-term sober maintenance skills, lacks sober coping skills, lacks awareness regarding the disease model of addiction and lacks a sober peer support network.    The patient reported he was willing to follow the above recommendations.    The patient would like the following family or other support people involved in their treatment:  None at this time    The patient has a history of opioid abuse and was given treatment options, including Medication Assisted Treatment and information on the risks of opioid use disorder including recognizing and responding to opioid overdose.  The patient is currently receiving the following services: The patient is currently working with the [facility] for Medication Assisted Therapy with Suboxone      3.  Mental Health Referrals:     The patient did not appear to be in any need of a mental health referral at this time.    4. Cultural Concerns:    The patient did not identify having any cultural concerns regarding mental health, physical health, or substance use issues.     5. Recommendations for treatment focus:      Alcohol / Substance Use - See #2. LAZARO Referrals above for details on recommendations.    Provider Name/ Credentials:  RAY Vasquez  December 5, 2022

## 2022-12-15 ENCOUNTER — TELEPHONE (OUTPATIENT)
Dept: BEHAVIORAL HEALTH | Facility: CLINIC | Age: 52
End: 2022-12-15

## 2022-12-15 ENCOUNTER — OFFICE VISIT (OUTPATIENT)
Dept: ADDICTION MEDICINE | Facility: CLINIC | Age: 52
End: 2022-12-15
Payer: COMMERCIAL

## 2022-12-15 VITALS — HEART RATE: 89 BPM | SYSTOLIC BLOOD PRESSURE: 154 MMHG | DIASTOLIC BLOOD PRESSURE: 88 MMHG

## 2022-12-15 DIAGNOSIS — F13.90 BENZODIAZEPINE MISUSE: ICD-10-CM

## 2022-12-15 DIAGNOSIS — F11.20 UNCOMPLICATED OPIOID DEPENDENCE (H): Primary | ICD-10-CM

## 2022-12-15 DIAGNOSIS — F17.200 TOBACCO DEPENDENCE SYNDROME: ICD-10-CM

## 2022-12-15 DIAGNOSIS — F11.20 OPIOID USE DISORDER, SEVERE, DEPENDENCE (H): ICD-10-CM

## 2022-12-15 DIAGNOSIS — F10.20 MODERATE ALCOHOL USE DISORDER (H): ICD-10-CM

## 2022-12-15 PROCEDURE — 99214 OFFICE O/P EST MOD 30 MIN: CPT | Performed by: FAMILY MEDICINE

## 2022-12-15 PROCEDURE — 80306 DRUG TEST PRSMV INSTRMNT: CPT | Performed by: FAMILY MEDICINE

## 2022-12-15 RX ORDER — BUPRENORPHINE HYDROCHLORIDE AND NALOXONE HYDROCHLORIDE DIHYDRATE 2; .5 MG/1; MG/1
1 TABLET SUBLINGUAL 2 TIMES DAILY
Qty: 30 TABLET | Refills: 0 | Status: SHIPPED | OUTPATIENT
Start: 2022-12-15 | End: 2022-12-22

## 2022-12-15 NOTE — NURSING NOTE
[facility] - Addiction Medicine       Rooming information:    Point of care urine drug screen positive for: n/a     *POC urine drug screen does not screen for Fentanyl    PHQ-9 Scores:   PHQ 11/1/2022 11/16/2022 12/5/2022   PHQ-9 Total Score 0 0 0   Q9: Thoughts of better off dead/self-harm past 2 weeks Not at all Not at all Not at all     ROOPA-7 Scores:  ROOPA-7 SCORE 2/16/2022 4/29/2022 10/18/2022   Total Score 3 (minimal anxiety) 0 (minimal anxiety) -   Total Score 3 0 3       Any other recent substance use:     Alcohol     NICOTINE-Yes:   If using nicotine, ready to quit? No    Side effects related to medications pt would like to discuss with provider (constipation, dry mouth, HA, GI upset, sedation?) No     Narcan currently available: Yes    Primary care provider: Physician No Ref-Primary     Mental health provider: denies (follow up on MH referral if needed)    Any housing, insurance deficits?: stable    Contact information up to date? yes    3rd Party Involvement not at this time (please obtain ROBBIE if pt would like to include)      Jennifer Manzo MA  December 15, 2022  9:11 AM

## 2022-12-15 NOTE — PROGRESS NOTES
[facility] Addiction [facility]    A/P                                                    ASSESSMENT/PLAN  Diagnoses and all orders for this visit:  Uncomplicated opioid dependence (H)  -     Urine Drugs of Abuse Screen Panel 13; Future  Moderate alcohol use disorder (H)  -     Ethyl Glucuronide Screen with Reflex to Confirmation, Urine; Future  Tobacco dependence syndrome  Benzodiazepine misuse  Opioid use disorder, severe, dependence (H)  -     buprenorphine-naloxone (SUBOXONE) 2-0.5 MG SUBL sublingual tablet; Place 1 tablet under the tongue 2 times daily    Orders Placed This Encounter   Medications     buprenorphine-naloxone (SUBOXONE) 2-0.5 MG SUBL sublingual tablet     Sig: Place 1 tablet under the tongue 2 times daily     Dispense:  30 tablet     Refill:  0     NADEAN: [id number]; Please substitute for covered alternative per insurance request.       Problem list updated Dec 15, 2022   No problems updated.      Dec 15, 2022  - Starting IOP on Monday, excited to learn more about alcohol cessation  - Reports no significant w/d symptoms. Indicates he has gabapentin at home in case he starts to feel worse  - Continue suboxone, no changes  - Will add another 4 week Rx once I can confirm attendance at IOP  - F/up late January      Last encounter A/P  Dec 1, 2022  - Continues to drink but denies BZD use. Confirmed BZD use at last appt, quant results today should confirm decreased levels  - Appt made for evaluation Monday Dec 5, 8am. Needs to attend IOP given ongoing desire to reduce alcohol use, eliminate BZD use, and despite this has ongoing use  - Will attempt withdrawal treatment at home, with support from his partner, once he enters treatment program and can have support to achieve ongoing abstinence  - CMP to evaluate withdrawal severity potential      PDMP Review       Value Time User    State PDMP site checked  Yes 12/1/2022  9:53 AM Jesu Arciniega MD            RTC  No follow-ups on  file.      Counseled the patient on the importance of having a recovery program in addition to medication to manage recovery.  Components include avoiding isolating, having willingness to change, avoiding triggers and managing cravings. Encouraged having some type of sober network and practicing honesty with trusted support person(s). Encouraged other services such as counseling, 12 step or other self-help organizations.      Opioid warning reviewed.  Risk of overdose following a period of abstinence due to decrease tolerance was discussed including risk of death.  Strongly recommended abstain from alcohol, benzodiazepines, THC, opioids and other drugs of abuse.  Increased risk of return to opioid use after use of these substances discussed.  Increased risk of overdose/death with use of other substances particularly benzodiazepines/alcohol reviewed.        SUBJECTIVE                                                    Justus Cope is a 52 year old male who presents to clinic today for follow up    Visit performed In Person, face-to-face        Recent HPI Details:  HPI Nov 1, 2022  - Reports no BZD use  - Wants to enter treatment, but has not made steps to enroll  - Reports no change in alcohol use  - No cravings or opioid use  - Consistent suboxone use  HPI 11/16/22  - pt states he plans to follow-up with recommendation to enter treatment as was discussed at our last visit. Denies any specific, acute concerns. Suboxone working well. Continues to use alcohol. Denies BZD use  HPI Dec 1, 2022  - Denies any BZD use  - Discussed again his desire to quit alcohol and stop BZD use. He agrees he wants to stop, and is motivated to enter IOP  - No changes in suboxone   - No changes in life otherwise. Partner continues to urge him to start treamtent  - Needs re-evaluation given timeframe since last eval      TODAY'S VISIT  HPI Dec 15, 2022  - Quit drinking 3-4 days ago, having shakes and anxiety mostly. Not needing  medication for symptoms. Has gabapentin and clonidine at home but gabapentin causes dizziness so he prefers to avoid this  - Starts outpatient treatment at [facility] on Monday  - Denies any BZD use      OBJECTIVE  PHYSICAL EXAM:  BP (!) 154/88   Pulse 89     GENERAL: healthy, alert and no distress  EYES: Eyes grossly normal to inspection, PERRL and conjunctivae and sclerae normal  RESP: No respiratory distress  MENTAL STATUS EXAM  Appearance/Behavior: No appearant distress  Speech: Normal  Mood/Affect: normal affect  Insight: Fair      PHQ-9 Score:   PHQ 11/1/2022 11/16/2022 12/5/2022   PHQ-9 Total Score 0 0 0   Q9: Thoughts of better off dead/self-harm past 2 weeks Not at all Not at all Not at all       ROOPA-7 Score:  ROOPA-7 SCORE 2/16/2022 4/29/2022 10/18/2022   Total Score 3 (minimal anxiety) 0 (minimal anxiety) -   Total Score 3 0 3       LABS (may not contain today's labs)                                                      Last Drug Confirmation panel  Opioids  Lab Results   Component Value Date    BUPR 98 (H) 03/02/2022    BUPRR 490 03/02/2022    NBUPR 41 (H) 03/02/2022    NBUPRR 205 03/02/2022    NXONE 14 (H) 08/25/2021    NXONER 11 08/25/2021     Sedatives/Stimulants/Other  Lab Results   Component Value Date    OXPAM Present (A) 08/25/2021    NDPAM Present (A) 08/25/2021    TPAM Present (A) 08/25/2021    and Recent Tox13 results  Lab Results   Component Value Date    THC13 Not Detected 12/15/2022    THC13 Not Detected 12/01/2022    PCP13 Not Detected 12/15/2022    PCP13 Not Detected 12/01/2022    COC13 Not Detected 12/15/2022    COC13 Not Detected 12/01/2022    MAMP13 Not Detected 12/15/2022    MAMP13 Not Detected 12/01/2022    OPI13 Not Detected 12/15/2022    OPI13 Not Detected 12/01/2022    AMP13 Not Detected 12/15/2022    AMP13 Not Detected 12/01/2022    BZO13 Detected (A) 12/15/2022    BZO13 Detected (A) 12/01/2022    TCA13 Not Detected 12/15/2022    TCA13 Not Detected 12/01/2022    MTD13 Not Detected  12/15/2022    MTD13 Not Detected 12/01/2022    BAR13 Not Detected 12/15/2022    BAR13 Not Detected 12/01/2022    OXY13 Not Detected 12/15/2022    OXY13 Not Detected 12/01/2022    PPX13 Not Detected 12/15/2022    PPX13 Not Detected 12/01/2022    BUP13 Detected (A) 12/15/2022    BUP13 Detected (A) 12/01/2022       Today's lab data  Results for orders placed or performed in visit on 12/15/22   Urine Drugs of Abuse Screen Panel 13     Status: Abnormal   Result Value Ref Range    Cannabinoids (82-oee-6-carboxy-9-THC) Not Detected Not Detected, Indeterminate    Phencyclidine Not Detected Not Detected, Indeterminate    Cocaine (Benzoylecgonine) Not Detected Not Detected, Indeterminate    Methamphetamine (d-Methamphetamine) Not Detected Not Detected, Indeterminate    Opiates (Morphine) Not Detected Not Detected, Indeterminate    Amphetamine (d-Amphetamine) Not Detected Not Detected, Indeterminate    Benzodiazepines (Nordiazepam) Detected (A) Not Detected, Indeterminate    Tricyclic Antidepressants (Desipramine) Not Detected Not Detected, Indeterminate    Methadone Not Detected Not Detected, Indeterminate    Barbiturates (Butalbital) Not Detected Not Detected, Indeterminate    Oxycodone Not Detected Not Detected, Indeterminate    Propoxyphene (Norpropoxyphene) Not Detected Not Detected, Indeterminate    Buprenorphine Detected (A) Not Detected, Indeterminate           HISTORY                                                    Problem list reviewed & adjusted, as indicated.  Patient Active Problem List   Diagnosis     Chronic neck pain     Alcohol use disorder, moderate, dependence (H)     Depression with anxiety     Nicotine use disorder     Uncomplicated opioid dependence (H)     Benzodiazepine abuse (H)         MEDICATION LIST (prior to visit)  buprenorphine-naloxone (SUBOXONE) 2-0.5 MG SUBL sublingual tablet, place one TABLET UNDER THE TONGUE TWICE DAILY  cloNIDine (CATAPRES) 0.1 MG tablet, Take 1 tablet (0.1 mg) by mouth  3 times daily as needed (anxiety, high bp)  gabapentin (NEURONTIN) 300 MG capsule, Take 1 capsule (300 mg) by mouth 2 times daily  topiramate (TOPAMAX) 50 MG tablet, Take 1/2 tablet by mouth at bedtime for 1 week, then increase to 1 tablet by mouth once daily    No current facility-administered medications on file prior to visit.      MEDICATION LIST (after visit)  Current Outpatient Medications   Medication     buprenorphine-naloxone (SUBOXONE) 2-0.5 MG SUBL sublingual tablet     buprenorphine-naloxone (SUBOXONE) 2-0.5 MG SUBL sublingual tablet     cloNIDine (CATAPRES) 0.1 MG tablet     gabapentin (NEURONTIN) 300 MG capsule     topiramate (TOPAMAX) 50 MG tablet     No current facility-administered medications for this visit.         No Known Allergies    Additional MDM Details:  none    Jesu Arciniega MD  [facility] Addiction [facility]  [phone]

## 2022-12-22 ENCOUNTER — TELEPHONE (OUTPATIENT)
Dept: ADDICTION MEDICINE | Facility: CLINIC | Age: 52
End: 2022-12-22

## 2022-12-22 DIAGNOSIS — F11.20 OPIOID USE DISORDER, SEVERE, DEPENDENCE (H): ICD-10-CM

## 2022-12-22 RX ORDER — BUPRENORPHINE HYDROCHLORIDE AND NALOXONE HYDROCHLORIDE DIHYDRATE 2; .5 MG/1; MG/1
1 TABLET SUBLINGUAL 2 TIMES DAILY
Qty: 30 TABLET | Refills: 0 | Status: SHIPPED | OUTPATIENT
Start: 2022-12-22 | End: 2023-01-27

## 2022-12-22 NOTE — TELEPHONE ENCOUNTER
Called, [facility] for Steven. Provided ph# for Lillian Moctezuma so he can call to schedule [facility] - [phone]    Called and spoke with partner Lissy. Steven continues to suffer from withdrawals at home, more severe than he reported to me. Shaking within 12-24hr after last drink. Continues to drink to avoid sx.    Advised I could prescribe diazepam for an outpt withdrawal tx course one time, but only after he is scheduled to start with his IOP group, and only with his express agreement that he will not drink while he takes vailum. 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 She is worried he is severely damaging his health and is worried about the health of their relationship if he does not stop drinking.    Will talk with Steven when I return from leave in January. 2wk suboxone sent to [facility]. If providers do not feel comfortable prescribing diazepam in the meantime, he will need to present to the hospital for withdrawal treatment. Based on several risk factors as described above, there is significant risk to attempt a home withdrawal treatment protocol, and this needs to be discussed directly with the patient before proceeding.    Jesu Arciniega MD

## 2022-12-30 ENCOUNTER — TELEPHONE (OUTPATIENT)
Dept: ADDICTION MEDICINE | Facility: CLINIC | Age: 52
End: 2022-12-30

## 2022-12-30 NOTE — TELEPHONE ENCOUNTER
Prior authorization is needed for the following medication:       Routing to PA team to initiate prior auth. Thanks!

## 2023-01-03 NOTE — TELEPHONE ENCOUNTER
Central Prior Authorization Team - Phone: [phone]     PA Initiation    Medication: Buprenorphine HCl-Naloxone HCl 2-0.5 MG - PA INITIATED  Insurance Company:    Pharmacy Filling the Rx: [facility] - [place], [place] - [address] [place] [address] [address]  Filling Pharmacy Phone: [phone]  Filling Pharmacy Fax:    Start Date: 1/3/2023

## 2023-01-03 NOTE — TELEPHONE ENCOUNTER
Central Prior Authorization Team - Phone: [phone]     Prior Authorization Approval- received call from Brain with [facility] gave authorization.    Authorization Effective Date: 1/3/2023  Authorization Expiration Date: 1/2/2024  Medication: Buprenorphine HCl-Naloxone HCl 2-0.5 MG - PA APPROVED  Approved Dose/Quantity: 30  Reference #:     Insurance Company:    Expected CoPay:       CoPay Card Available:      Foundation Assistance Needed:    Which Pharmacy is filling the prescription (Not needed for infusion/clinic administered): [facility] - [address]  Pharmacy Notified: Yes  Patient Notified: YesComment:  pharmacy will notify when ready

## 2023-02-07 ENCOUNTER — TELEPHONE (OUTPATIENT)
Dept: BEHAVIORAL HEALTH | Facility: CLINIC | Age: 53
End: 2023-02-07
Payer: COMMERCIAL

## 2023-02-07 DIAGNOSIS — F11.20 OPIOID USE DISORDER, SEVERE, DEPENDENCE (H): Primary | ICD-10-CM

## 2023-02-07 PROCEDURE — 99207 PR SELF-HELP/PEER SVC PER 15 MIN: CPT

## 2023-02-07 NOTE — TELEPHONE ENCOUNTER
SCARLETT placed a telephone recovery support call to pt given a recent no show for scheduled appointment with provider in Recovery Clinic.    [facility] left a voicemail message providing Recovery Clinic number [phone] to schedule a new appointment, as well as Recovery Clinic Walk-In hours: Monday - Friday from 9 am to 3 pm.     Shashank Vásquez  Peer   Recovery Clinic   Direct Dial: [phone]    9:57 am

## 2023-02-09 ENCOUNTER — TELEPHONE (OUTPATIENT)
Dept: ADDICTION MEDICINE | Facility: CLINIC | Age: 53
End: 2023-02-09
Payer: COMMERCIAL

## 2023-02-09 NOTE — TELEPHONE ENCOUNTER
Pt no showed last appt in Recovery Clinic on 2/3/2023.    Last seen in Addiction Med Clinic 12/15/2022.    Routing to Dr. Arciniega.

## 2023-02-09 NOTE — TELEPHONE ENCOUNTER
Reason for call:  Other      Patient called regarding (reason for call): call back     Additional comments: SO of pt would like to speak to Dr. Arciniega. There is an ROBBIE on file. Per caller Pt has not been able to get a refill of their medicaiton and is not asking them for theirs and threatening self harm when they refuse. SO was not aware that pt in fact no showed most recent addiction med appointment, and it appears they have not enrolled in treatment despite there being an opening ready for them in late Janurary. SO would like to consult with Suze for support.     Phone number to reach the caller: [phone]     Best Time:  ASAP (pt returns from work and SO would like to discuss campos)     Can we leave a detailed message on this number?  YES     Travel screening: Not Applicable

## 2023-02-10 NOTE — TELEPHONE ENCOUNTER
"Talked with SO Lissy. Steven continues to drink and lie to Lissy about his engagement with care. He has not showed up for his appt with [facility], nor has he come for IOP despite being offered flexible intake earlier this week.    She is concerned about his suicidal threats and has decided to leave to avoid being taken \"emotionally hostage\". I validated her concern for safety, given his repeated verbal abuse and asking her for her prescription medications.    Attempted to call Steven to invite him for a [facility] visit today, LVM. He needs to come in person for a visit, as I refilled his medication via phone 2 weeks ago as a bridge to make it in for an appt.    If Steven arrives at  ED this evening/weekend, please provide suboxone for withdrawal (takes 2mg BID) and provide a bridge up to 3 days so he can come to [facility] for ongoing care. He likely needs alcohol withdrawal treatment as well - if he is clinically requiring inpatient withdrawal treatment, it would be understandable to avoid prescribing suboxone, however it would be safe to use suboxone to stabilize opioid withdrawals while being monitored in ED.    Jesu Arciniega MD    "

## 2023-02-17 ENCOUNTER — HOSPITAL ENCOUNTER (EMERGENCY)
Facility: CLINIC | Age: 53
Discharge: HOME | End: 2023-02-17
Attending: FAMILY MEDICINE | Admitting: FAMILY MEDICINE
Payer: COMMERCIAL

## 2023-02-17 VITALS
RESPIRATION RATE: 16 BRPM | TEMPERATURE: 97.9 F | OXYGEN SATURATION: 98 % | DIASTOLIC BLOOD PRESSURE: 72 MMHG | HEART RATE: 102 BPM | SYSTOLIC BLOOD PRESSURE: 101 MMHG

## 2023-02-17 DIAGNOSIS — F11.23 OPIOID DEPENDENCE WITH WITHDRAWAL (H): ICD-10-CM

## 2023-02-17 PROCEDURE — 250N000013 HC RX MED GY IP 250 OP 250 PS 637: Performed by: FAMILY MEDICINE

## 2023-02-17 PROCEDURE — 99283 EMERGENCY DEPT VISIT LOW MDM: CPT | Performed by: FAMILY MEDICINE

## 2023-02-17 PROCEDURE — 99284 EMERGENCY DEPT VISIT MOD MDM: CPT | Performed by: FAMILY MEDICINE

## 2023-02-17 RX ORDER — BUPRENORPHINE AND NALOXONE 2; .5 MG/1; MG/1
1 FILM, SOLUBLE BUCCAL; SUBLINGUAL ONCE
Status: COMPLETED | OUTPATIENT
Start: 2023-02-17 | End: 2023-02-17

## 2023-02-17 RX ORDER — BUPRENORPHINE AND NALOXONE 2; .5 MG/1; MG/1
1 FILM, SOLUBLE BUCCAL; SUBLINGUAL 2 TIMES DAILY
Qty: 8 FILM | Refills: 0 | Status: SHIPPED | OUTPATIENT
Start: 2023-02-17 | End: 2023-02-21

## 2023-02-17 RX ADMIN — BUPRENORPHINE AND NALOXONE 1 FILM: 2; .5 FILM, SOLUBLE BUCCAL; SUBLINGUAL at 16:14

## 2023-02-17 ASSESSMENT — ACTIVITIES OF DAILY LIVING (ADL): ADLS_ACUITY_SCORE: 35

## 2023-02-17 NOTE — DISCHARGE INSTRUCTIONS
Discharge to home with prescription for Suboxone patient is refusing any alcohol detox and will follow-up with his outpatient provider.

## 2023-02-17 NOTE — ED PROVIDER NOTES
[facility] EMERGENCY DEPARTMENT ([facility])    2/17/23      ED PROVIDER NOTE    History     Chief Complaint   Patient presents with     Medication Refill     Needs a refill for Suboxone, missed his appointment. Has new appointment for next Tuesday at 8am. Doctor advised him to come here to get breakthrough dosing.      The history is provided by the patient and medical records.     Justus Cope is a 52 year old male with polysubstance use disorder (alcohol, opiates, benzodiazepines) followed by Dr. Shimon Arciniega who presents seeking a bridge prescription until he can make his next appointment.  He had missed prior appointments and is out of his Suboxone, his next appointment is 4 days from now at 8 AM.  He is experiencing withdrawal symptoms, contacted Dr. Arciniega who recommended he come to the ED for evaluation. Patient is not interested in alcohol detox. He wants his script and discharge.     Dr. Arciniega's note from today:  Attempted to call Steven to invite him for a [facility] visit today, LVM. He needs to come in person for a visit, as I refilled his medication via phone 2 weeks ago as a bridge to make it in for an appt.     If Steven arrives at  ED this evening/weekend, please provide suboxone for withdrawal (takes 2mg BID) and provide a bridge up to 3 days so he can come to [facility] for ongoing care. He likely needs alcohol withdrawal treatment as well - if he is clinically requiring inpatient withdrawal treatment, it would be understandable to avoid prescribing suboxone, however it would be safe to use suboxone to stabilize opioid withdrawals while being monitored in ED.    Past Medical History  Past Medical History:   Diagnosis Date     Childhood asthma      Chronic neck pain      H/O anxiety disorder      H/O: depression      Opioid use disorder      Past Surgical History:   Procedure Laterality Date     NO HISTORY OF SURGERY       buprenorphine HCl-naloxone HCl (SUBOXONE) 2-0.5 MG  per film  buprenorphine-naloxone (SUBOXONE) 2-0.5 MG SUBL sublingual tablet  cloNIDine (CATAPRES) 0.1 MG tablet  gabapentin (NEURONTIN) 300 MG capsule  topiramate (TOPAMAX) 50 MG tablet  buprenorphine-naloxone (SUBOXONE) 2-0.5 MG SUBL sublingual tablet      No Known Allergies  Family History  Family History   Problem Relation Age of Onset     Substance Abuse No family hx of      Social History   Social History     Tobacco Use     Smoking status: Every Day     Packs/day: 1.00     Types: Cigarettes     Start date: 1985     Smokeless tobacco: Never   Vaping Use     Vaping Use: Never used   Substance Use Topics     Alcohol use: Yes     Comment: last drank two days ago      Drug use: Not Currently         A medically appropriate review of systems was performed with pertinent positives and negatives noted in the HPI, and all other systems negative.    Physical Exam   BP: 114/67  Pulse: (!) 125  Temp: 97.9  F (36.6  C)  Resp: 22  SpO2: 97 %  Physical Exam  Constitutional:       General: He is not in acute distress.     Appearance: He is not diaphoretic.   HENT:      Head: Atraumatic.   Eyes:      General: No scleral icterus.     Pupils: Pupils are equal, round, and reactive to light.   Cardiovascular:      Heart sounds: Normal heart sounds.   Pulmonary:      Effort: No respiratory distress.      Breath sounds: Normal breath sounds.   Abdominal:      General: Bowel sounds are normal.      Palpations: Abdomen is soft.      Tenderness: There is no abdominal tenderness.   Musculoskeletal:         General: No tenderness.   Skin:     General: Skin is warm.      Findings: No rash.   Neurological:      General: No focal deficit present.      Mental Status: He is oriented to person, place, and time.      Sensory: No sensory deficit.      Motor: No weakness.      Coordination: Coordination normal.         ED Course, Procedures, & Data      Procedures        No results found for any visits on 02/17/23.  Medications   buprenorphine  HCl-naloxone HCl (SUBOXONE) 2-0.5 MG per film 1 Film (1 Film Sublingual Given 2/17/23 1614)     Labs Ordered and Resulted from Time of ED Arrival to Time of ED Departure - No data to display  No orders to display          Medical Decision Making  The patient's presentation is strongly suggestive of a chronic illness mild to moderate exacerbation, progression, or side effect of treatment.    The patient's evaluation involved:  strong consideration of a test (Patient was offered further evaluation including urine tox screen and further treatment including evaluation for any electrolyte abnormalities or metabolic disturbances patient is refusing at this time) that was ultimately deferred    The patient's management involved limitations due to social determinants of health (Patient needing chemical dependency treatment for his alcohol dependence but is refusing at this time will continue with his Suboxone chronic treatment.).      Assessment & Plan        I have reviewed the nursing notes. I have reviewed the findings, diagnosis, plan and need for follow up with the patient.    Discharge Medication List as of 2/17/2023  5:08 PM      START taking these medications    Details   buprenorphine HCl-naloxone HCl (SUBOXONE) 2-0.5 MG per film Place 1 Film under the tongue 2 times daily, Disp-8 Film, R-0, E-Prescribe             Final diagnoses:   Opioid dependence with withdrawal (H)     I, Kelly Vega, am serving as a trained medical scribe to document services personally performed by Hi Taylor MD based on the provider's statements to me on February 17, 2023.  This document has been checked and approved by the attending provider.    I, Hi Taylor MD, was physically present and have reviewed and verified the accuracy of this note documented by Kelly Vega, medical scribe.      Hi Taylor MD       [facility] EMERGENCY DEPARTMENT  2/17/2023     Hi Taylor  MD Carolyn  02/18/23 0576

## 2023-02-17 NOTE — ED TRIAGE NOTES
Sent here from Dr. Roe's office. Patient missed his appointment has a new appointment on Tuesday. Here for a prescription to get him to Tuesday      Triage Assessment     Row Name 02/17/23 3213       Triage Assessment (Adult)    Airway WDL WDL       Respiratory WDL    Respiratory WDL WDL       Skin Circulation/Temperature WDL    Skin Circulation/Temperature WDL WDL       Cardiac WDL    Cardiac WDL WDL       Peripheral/Neurovascular WDL    Peripheral Neurovascular WDL WDL       Cognitive/Neuro/Behavioral WDL    Cognitive/Neuro/Behavioral WDL WDL

## 2023-02-20 ENCOUNTER — TELEPHONE (OUTPATIENT)
Dept: ADDICTION MEDICINE | Facility: CLINIC | Age: 53
End: 2023-02-20
Payer: COMMERCIAL

## 2023-02-20 NOTE — TELEPHONE ENCOUNTER
Referral from Addiction Medicine to [facility]     Routing to [facility] team to ensure that Steven cannot schedule with Addiction Medicine until indicated by [facility] team     Reason for referral?:   - Missed appointments  - Ongoing, heavy use of alcohol. Needing [facility] treatment but not completing admission     What is the main focus for improvement (attendance, decreased use, etc)?:   - attendance for clinic appointments  - alcohol abstinence  - [facility] program attendance     What is preferred timeline/criteria to return to [facility] - Jesu Arciniega MD?  - Needs to demonstrate consistency with provider visits (minimum 3 scheduled visits consecutively - cancellations and rescheduling at least 24hr in advance are acceptable)  - Ongoing attendance at [facility] programming for minimum 1 month  - Significant reduction in alcohol (PeTH testing showing 80% reduction or more) or full abstinence    - Next appt with Jesu Arciniega MD?: not scheduled        Jesu Arciniega MD

## 2023-02-20 NOTE — TELEPHONE ENCOUNTER
Until we reach the patient, please keep patient on Dr. Dawn's schedule, in case he arrives to [facility] office for care tomorrow.    If he arrives to [facility], instead of [facility], he will need to be redirected to the [facility].    [facility]  [address], [address]   [place], [place], [address]  Phone: [phone]  Fax: [phone]    Open Monday-Friday  9:00am-4:00pm  Closed over lunch hour  Walk in hours: 9am-11:30am and 12:30-3pm    Please help direct Justus to [facility], 1st floor [facility] upon arrival.    Separate note to follow with [facility] transfer instructions.    Jesu Arciniega MD

## 2023-02-20 NOTE — TELEPHONE ENCOUNTER
Patient was erroneously scheduled with me at [facility], he is a patient at [facility] w/ Dr Arciniega but was transferred to [facility] for management.  Please reach out to him and have him come to  for follow-up tomorrow.      Thanks.    Marylu Dawn, DO on 2/20/2023 at 12:20 PM

## 2023-02-20 NOTE — TELEPHONE ENCOUNTER
Writer attempted to reach patient via telephone, no answer; left VM message asking for return call to [facility]. [employer] message sent to patient informing patient he was incorrectly scheduled with [facility] in [place], [place] on 02/21/2023. Writer advised patient that Dr. Arciniega recommended he follow-up in the [facility], clinic location and hours sent to patient as well.     Routing to Dr. Arciniega with a request that an encounter be placed with transfer of care recommendations.     Alicia Vidal RN on 2/20/2023 at 1:07 PM

## 2023-02-20 NOTE — TELEPHONE ENCOUNTER
According to [employer], message sent by writer was read:     Last read by Justus Cope at 2:54 PM on 2/20/2023.    Alicia Vidal RN on 2/20/2023 at 3:04 PM

## 2023-02-21 ENCOUNTER — OFFICE VISIT (OUTPATIENT)
Dept: BEHAVIORAL HEALTH | Facility: CLINIC | Age: 53
End: 2023-02-21
Payer: COMMERCIAL

## 2023-02-21 ENCOUNTER — TELEPHONE (OUTPATIENT)
Dept: BEHAVIORAL HEALTH | Facility: CLINIC | Age: 53
End: 2023-02-21

## 2023-02-21 ENCOUNTER — HOSPITAL ENCOUNTER (EMERGENCY)
Facility: CLINIC | Age: 53
Discharge: HOME | End: 2023-02-21
Attending: EMERGENCY MEDICINE
Payer: COMMERCIAL

## 2023-02-21 ENCOUNTER — LAB (OUTPATIENT)
Dept: LAB | Facility: CLINIC | Age: 53
End: 2023-02-21
Payer: COMMERCIAL

## 2023-02-21 VITALS
DIASTOLIC BLOOD PRESSURE: 76 MMHG | OXYGEN SATURATION: 96 % | RESPIRATION RATE: 16 BRPM | SYSTOLIC BLOOD PRESSURE: 133 MMHG | TEMPERATURE: 98 F

## 2023-02-21 VITALS
DIASTOLIC BLOOD PRESSURE: 80 MMHG | OXYGEN SATURATION: 97 % | SYSTOLIC BLOOD PRESSURE: 116 MMHG | TEMPERATURE: 98.3 F | HEART RATE: 96 BPM

## 2023-02-21 DIAGNOSIS — F11.20 OPIOID TYPE DEPENDENCE, CONTINUOUS (H): ICD-10-CM

## 2023-02-21 DIAGNOSIS — F10.20 ALCOHOL USE DISORDER, MODERATE, DEPENDENCE (H): ICD-10-CM

## 2023-02-21 DIAGNOSIS — F11.20 OPIOID USE DISORDER, SEVERE, DEPENDENCE (H): Primary | ICD-10-CM

## 2023-02-21 DIAGNOSIS — F13.90 BENZODIAZEPINE MISUSE: ICD-10-CM

## 2023-02-21 DIAGNOSIS — F11.90 OPIATE USE: ICD-10-CM

## 2023-02-21 PROCEDURE — 99283 EMERGENCY DEPT VISIT LOW MDM: CPT

## 2023-02-21 PROCEDURE — H0038 SELF-HELP/PEER SVC PER 15MIN: HCPCS | Mod: U5

## 2023-02-21 PROCEDURE — 250N000013 HC RX MED GY IP 250 OP 250 PS 637: Performed by: EMERGENCY MEDICINE

## 2023-02-21 PROCEDURE — 80321 ALCOHOLS BIOMARKERS 1OR 2: CPT

## 2023-02-21 PROCEDURE — 99283 EMERGENCY DEPT VISIT LOW MDM: CPT | Performed by: EMERGENCY MEDICINE

## 2023-02-21 PROCEDURE — 36415 COLL VENOUS BLD VENIPUNCTURE: CPT

## 2023-02-21 PROCEDURE — 99214 OFFICE O/P EST MOD 30 MIN: CPT | Performed by: NURSE PRACTITIONER

## 2023-02-21 RX ORDER — BUPRENORPHINE AND NALOXONE 2; .5 MG/1; MG/1
1 FILM, SOLUBLE BUCCAL; SUBLINGUAL ONCE
Status: COMPLETED | OUTPATIENT
Start: 2023-02-21 | End: 2023-02-21

## 2023-02-21 RX ORDER — ACAMPROSATE CALCIUM 333 MG/1
666 TABLET, DELAYED RELEASE ORAL 3 TIMES DAILY
Qty: 180 TABLET | Refills: 0 | Status: ON HOLD | OUTPATIENT
Start: 2023-02-21 | End: 2023-04-13

## 2023-02-21 RX ORDER — BUPRENORPHINE AND NALOXONE 2; .5 MG/1; MG/1
1 FILM, SOLUBLE BUCCAL; SUBLINGUAL 2 TIMES DAILY
Qty: 28 FILM | Refills: 0 | Status: SHIPPED | OUTPATIENT
Start: 2023-02-21 | End: 2023-03-07

## 2023-02-21 RX ADMIN — BUPRENORPHINE AND NALOXONE 1 FILM: 2; .5 FILM, SOLUBLE BUCCAL; SUBLINGUAL at 09:36

## 2023-02-21 ASSESSMENT — PATIENT HEALTH QUESTIONNAIRE - PHQ9: SUM OF ALL RESPONSES TO PHQ QUESTIONS 1-9: 0

## 2023-02-21 NOTE — DISCHARGE INSTRUCTIONS
Go to the [facility] now for a walk-in appointment and further medication management.    Return if any concerns.

## 2023-02-21 NOTE — PROGRESS NOTES
[facility] - [facility] Follow Up    ASSESSMENT/PLAN                                                      1. Opioid use disorder, severe, dependence (H)  Controlled. No recent use or cravings. Continue Suboxone 2 mg SL BID unchanged.   Plans to start outpatient programming.   - buprenorphine HCl-naloxone HCl (SUBOXONE) 2-0.5 MG per film; Place 1 Film under the tongue 2 times daily  Dispense: 28 Film; Refill: 0    2. Alcohol use disorder, moderate, dependence (H)  - Uncontrolled. Last use 2/17/23. Continue clonidine and gabapentin as needed. No refills needed today.   - Plans to start outpatient programming, strongly encouraged this.   - Check PEth today and monitor monthly. Monitoring for at least 80% reduction.   - Start Campral 666 mg PO TID for cravings.   - Phosphatidylethanol (PEth), Whole Blood; Future  - acamprosate (CAMPRAL) 333 MG EC tablet; Take 2 tablets (666 mg) by mouth 3 times daily  Dispense: 180 tablet; Refill: 0    3. Benzodiazepine misuse  - Return to benzodiazapine use in the setting of withdrawal from Suboxone when pt ran out of medication. Recommended abstinence. Reviewed risks of benzodiazapine use especially in combination with alcohol use and buprenorphine. Pt verbalizes understanding.   - Plans to start outpatient programming.     Reviewed with patient Dr. Arciniega's referral back to  and criteria to return to [facility] clinic.    - Needs to demonstrate consistency with provider visits (minimum 3 scheduled visits consecutively - cancellations and rescheduling at least 24hr in advance are acceptable)  - Ongoing attendance at [facility] programming for minimum 1 month  - Significant reduction in alcohol (PeTH testing showing 80% reduction or more) or full abstinence    Patient counseling completed today:  Recommended avoiding concurrent use of alcohol, benzodiazepines or other sedatives with buprenorphine or other opioids.    Discussed importance of  building a network of supportive  "relationships, avoiding people/places/things associated with past use to reduce risk of relapse; including motivational interviewing regarding psychosocial treatment for addiction.     Return in about 2 weeks (around 3/7/2023) for Follow up, with any available provider, in person.    SUBJECTIVE                                                          CC/HPI:  Justus Cope is a 52 year old male with PMH neck pain, depression with anxiety, moderate alcohol use disorder, tobacco dependence, and opioid dependence  who presents to the [facility] for follow up. Pt was referred by Dr. Shimon Arciniega in Addiction medication.      First [facility] visit:  7/21/22     Brief history of use:   Previously following with Dr. Arciniega in Addiction medicine for ongoing management of opioid dependence and alcohol use disorder moderate. At initial visit pt reports he has continue Suboxone despite being \"out\" for 2 months. He had been breaking each tablet into multiple pieces and taking it once per day. He will be a couple days between doses. Denies recent full opioid agonist use. Reports he was taking percocet when he was living in [place]. He is currently working full time at 2 different jobs, [employer] and as a PCA. Reports he receives alcohol from his job. Reports drinking about 3 beers every other day. His goal is NOT abstinence. He has been previously positive for benzo's in the past. No prescribed this medication. He has been going to . \"I know my drinking is problematic.\"      Substance Use History :  Opioids:   Age at first use: [birth year]2010  Current use: timing of last use: 2015; substance: prescribed pain meds; route: oral and snort                 IV drug use: No   History of overdose: No  Previous treatments : No  Longest period of sobriety: currently   Medical complications related to substance use: denies  Hepatitis C: negative per pt; no recent screening found   HIV: negative per pt; no recent screening " found     Other Addiction History:  Stimulants:   Past use: denies  Use in last 6 months: denies  Sedatives/hypnotics/anxiolytics:   Past use: denies  Use in last 6 months: denies - UDS positive for BZO on 7/21/22  Alcohol:   Past use: occasional   Use in last 6 months: 2-4 beers every other day  Nicotine:   Cigarettes: 1 pack daily  Vaping: denies  Chewing tobacco: denies  Cannabis:   Past use: occasionally  Use in last 6 months: denies  Hallucinogens:   Past use: not for over 25 years  Use in last 6 months: denies  Behavioral addictions:   denies     PAST PSYCHIATRIC HISTORY:  Diagnoses- depression with anxiety   Suicide Attempts: No   Hospitalizations: No      Social History  Housing status: with girlfriend  Employment status: Employed full time  Relationship status: Partnered  Children: 1 child, 28 YO  Legal: denies  Insurance needs: active  Contact information up to date? yes      Most recent [facility] visit:    A/P last visit:  Dec 15, 2022  - Starting IOP on Monday, excited to learn more about alcohol cessation  - Reports no significant w/d symptoms. Indicates he has gabapentin at home in case he starts to feel worse  - Continue suboxone, no changes  - Will add another 4 week Rx once I can confirm attendance at IOP  - F/up late January    [facility] referral per Dr. Arciniega:   - Needs to demonstrate consistency with provider visits (minimum 3 scheduled visits consecutively - cancellations and rescheduling at least 24hr in advance are acceptable)  - Ongoing attendance at [facility] programming for minimum 1 month  - Significant reduction in alcohol (PeTH testing showing 80% reduction or more) or full abstinence    02/21/23 visit:  - Reports he missed an appointment with Dr. Dawn, [facility]   - Has continued taking Suboxone as prescribed, taking 2 mg BID. Denies adverse SE. No opioid use.   - He is now aware he can use medical rides to get to appointments   - Reports when he was  "out of Suboxone for 6 days he took 5 mg diazepam from a friend to try and treat withdrawal. Reports it was not helpful and his most bothersome symptoms was GI upset and diarrhea.   - Reports he has not yet attended outpatient treatment. Has completed assessment. Program is in [place]. Was unable to get to treatment due to his wife being out of town and only having one car. He does plan to start attending.   - Reports his last alcoholic beverage was 4 days ago, was drinking \" a lot\" reports drinking 2-3 beers per day. He has been taking clonidine 0.1 mg once daily and Gabapentin at bedtime as needed. Does endorse alcohol cravings. Reports Topamax was not effective for alcohol cravings. Has not previously tried Campral.     [place] Prescription Drug Monitoring Program Reviewed:  Yes  02/17/2023  2   02/17/2023  Buprenorphine-Nalox 2-0.5mg Tb  8.00  4       Medications:  cloNIDine (CATAPRES) 0.1 MG tablet, Take 1 tablet (0.1 mg) by mouth 3 times daily as needed (anxiety, high bp) (Patient not taking: Reported on 2/21/2023)  gabapentin (NEURONTIN) 300 MG capsule, Take 1 capsule (300 mg) by mouth 2 times daily (Patient not taking: Reported on 2/21/2023)    [COMPLETED] buprenorphine HCl-naloxone HCl (SUBOXONE) 2-0.5 MG per film 1 Film        No Known Allergies    PMH, PSH, FamHx reviewed      OBJECTIVE                                                      /80 (BP Location: Left arm, Patient Position: Sitting)   Pulse 96   Temp 98.3  F (36.8  C) (Oral)   SpO2 97%   PHQ 2/2/2023 2/2/2023 2/21/2023   PHQ-9 Total Score 2 2 0   Q9: Thoughts of better off dead/self-harm past 2 weeks Not at all Not at all Not at all       Physical Exam  Vitals and nursing note reviewed.   Constitutional:       General: He is not in acute distress.     Appearance: Normal appearance. He is not ill-appearing or diaphoretic.   Eyes:      Extraocular Movements: Extraocular movements intact.   Pulmonary:      Effort: Pulmonary effort is " normal.   Neurological:      General: No focal deficit present.      Mental Status: He is alert and oriented to person, place, and time.   Psychiatric:         Attention and Perception: Attention and perception normal.         Mood and Affect: Mood is anxious. Mood is not depressed. Affect is not flat.         Speech: Speech normal. Speech is not rapid and pressured or slurred.         Behavior: Behavior is cooperative.         Thought Content: Thought content normal. Thought content does not include suicidal ideation. Thought content does not include suicidal plan.         Cognition and Memory: Memory normal.      Comments: Insight and judgment poor - fair          Labs:    UDS:  02/21/23  Urine Drug Screen Results  AMP: Negative  BAR: Negative  BUP: Positive  BZO: Positive  SHIRA: Negative  mAMP: Negative  MDMA : Negative  MTD: Negative  FRQ282: Negative  OXY: Negative  PCP : Negative  THC : Negative  *POC urine drug screen does not screen for Fentanyl      No results found for this or any previous visit (from the past 240 hour(s)).    CARLOS Fernandes CNP  [place] [id number] [place] [id number] [phone]

## 2023-02-21 NOTE — TELEPHONE ENCOUNTER
Prior Authorization Retail Medication Request    Medication/Dose:   acamprosate (CAMPRAL) 333 MG EC tablet 180 tablet 0 2/21/2023  --   Sig - Route: Take 2 tablets (666 mg) by mouth 3 times daily - Oral     Insurance Name:  [facility]    Cover My Meds Key: [id number]    Pharmacy Information (if different than what is on RX)  Name:  [facility]  Phone:  [phone]    Routing to PA team.    Rowena Marques RN on 2/21/2023 at 2:14 PM

## 2023-02-21 NOTE — TELEPHONE ENCOUNTER
Reason for Call:  Other prescription    Detailed comments: Pt was just seen this morning at [facility] by Emily. Requesting that suboxone be switched back to tablets. States he was prescribed sublingual films and will need a PA for that. Pt has been taking tablets for years and would like to continue the tablets. Per pt, pharmacy has tablets on stand by for pt and advised that clinic give them a call to coordinate. Please further assist.    Phone Number Patient can be reached at: Cell number on file:    Telephone Information:   Mobile [phone]       Best Time: asap    Can we leave a detailed message on this number? YES    Call taken on 2/21/2023 at 1:14 PM by Salud Britton

## 2023-02-21 NOTE — ED PROVIDER NOTES
ED Provider Note  [facility]      History     Chief Complaint   Patient presents with     Medication Refill     HPI  Justus Cope is a 52 year old male with a history of opiate use disorder who presents to the emergency department seeking refill of Suboxone.  The patient was supposed to have an appointment with Dr. Dawn at a clinic in [place] today but was called and informed that he needs to come to the recovery clinic for a walk-in appointment.  He presented to the emergency department instead.  He was seen here 4 days ago and prescribed a bridge prescription to get him to this appointment this morning.  He last had Suboxone last evening.  He denies any current withdrawal symptoms.  He states that he has been maintained on Suboxone for approximately 3 years without any difficulties.  He denies any recent illness or acute medical concerns.    Past Medical History  Past Medical History:   Diagnosis Date     Childhood asthma      Chronic neck pain      H/O anxiety disorder      H/O: depression      Opioid use disorder      Past Surgical History:   Procedure Laterality Date     NO HISTORY OF SURGERY       buprenorphine HCl-naloxone HCl (SUBOXONE) 2-0.5 MG per film  buprenorphine-naloxone (SUBOXONE) 2-0.5 MG SUBL sublingual tablet  buprenorphine-naloxone (SUBOXONE) 2-0.5 MG SUBL sublingual tablet  cloNIDine (CATAPRES) 0.1 MG tablet  gabapentin (NEURONTIN) 300 MG capsule  topiramate (TOPAMAX) 50 MG tablet      No Known Allergies  Family History  Family History   Problem Relation Age of Onset     Substance Abuse No family hx of      Social History   Social History     Tobacco Use     Smoking status: Every Day     Packs/day: 1.00     Types: Cigarettes     Start date: 1985     Smokeless tobacco: Never   Vaping Use     Vaping Use: Never used   Substance Use Topics     Alcohol use: Yes     Comment: last drank two days ago      Drug use: Not Currently         A medically appropriate review  of systems was performed with pertinent positives and negatives noted in the HPI, and all other systems negative.    Physical Exam   BP: 133/76  Temp: 98  F (36.7  C)  Resp: 16  SpO2: 96 %  Physical Exam  Vitals and nursing note reviewed.   Constitutional:       General: He is not in acute distress.     Appearance: Normal appearance. He is not diaphoretic.   HENT:      Head: Normocephalic and atraumatic.   Eyes:      General: No scleral icterus.     Pupils: Pupils are equal, round, and reactive to light.   Cardiovascular:      Rate and Rhythm: Normal rate and regular rhythm.      Pulses: Normal pulses.      Heart sounds: Normal heart sounds.   Pulmonary:      Effort: No respiratory distress.      Breath sounds: Normal breath sounds.   Abdominal:      General: Bowel sounds are normal.      Palpations: Abdomen is soft.      Tenderness: There is no abdominal tenderness.   Musculoskeletal:         General: No tenderness. Normal range of motion.   Skin:     General: Skin is warm.      Findings: No rash.   Neurological:      General: No focal deficit present.      Mental Status: He is alert.      Cranial Nerves: No cranial nerve deficit.      Motor: No weakness.      Coordination: Coordination normal.      Gait: Gait normal.   Psychiatric:         Mood and Affect: Mood normal.         ED Course, Procedures, & Data      Procedures             Reviewed ED encounter from 2/17/2023 as well as addiction medicine telephone triage encounter from today.         No results found for any visits on 02/21/23.  Medications   buprenorphine HCl-naloxone HCl (SUBOXONE) 2-0.5 MG per film 1 Film (has no administration in time range)     Labs Ordered and Resulted from Time of ED Arrival to Time of ED Departure - No data to display  No orders to display              Medical Decision Making  The patient's presentation was of straightforward complexity (a clearly self-limited or minor problem).    The patient's evaluation involved:  review of  external note(s) from 2 sources (Recent ED visit and addiction medicine telephone triage note)    The patient's management necessitated moderate risk (prescription drug management including medications given in the ED).      Assessment & Plan    52 year old male with history of opiate use disorder to the emergency department seeking Suboxone.  He has run out of his bridge prescription that he was provided a few days ago here in the emergency department.  Addiction medicine clinic triage note from today indicate patient is to walk into the recovery clinic today for further management of his Suboxone prescriptions.  He has not had a dose of Suboxone since last night.  He was given his usual dose here in the emergency department and discharged to the recovery clinic for further management of opiate use disorder.  Patient had no other medical concerns and otherwise had a normal physical exam.    I have reviewed the nursing notes. I have reviewed the findings, diagnosis, plan and need for follow up with the patient.    New Prescriptions    No medications on file       Final diagnoses:   Opiate use - disorder     Chart documentation was completed with Dragon voice-recognition software. Even though reviewed, this chart may still contain some grammatical, spelling, and word errors.     Michael Lara Md  [facility] EMERGENCY DEPARTMENT  2/21/2023     Michael Lara MD  02/21/23 2013

## 2023-02-21 NOTE — NURSING NOTE
[facility]      Rooming information:  Approximate last use of full opioid agonist: 15 years ago  Taking buprenorphine? Yes:  As prescribed? Yes:   Number of buprenorphine films/tablets remaining currently: 0  Side effects related to buprenorphine (constipation, dry mouth, sedation?) No     Narcan currently available: Yes  Other recent substance use:    Diazepam-got from a friend  NICOTINE-Yes: Cigarettes  If using nicotine, ready to quit? No    Point of care urine drug screen positive for:  Urine Drug Screen Results  AMP: Negative  BAR: Negative  BUP: Positive  BZO: Positive  SHIRA: Negative  mAMP: Negative  MDMA : Negative  MTD: Negative  [id number]: Negative  OXY: Negative  PCP : Negative  THC : Negative  *POC urine drug screen does not screen for Fentanyl    PHQ Assesment Total Score(s) 2/21/2023   PHQ-9 Score 0   Some recent data might be hidden       If PHQ-9 score of 15 or higher, has Recovery Clinic therapist or provider been notified? N/A    Any current suicidal ideation? No  If yes, has Recovery Clinic therapist or provider been notified? N/A    Primary care provider: Denies Physician No Ref-Primary     Mental health provider: Denies (follow up on MH referral if needed)    Insurance needs: Active    Housing needs: Stable    Contact information up to date? Yes    3rd Party Involvement None today     Alicia Vidal RN  February 21, 2023  10:39 AM

## 2023-02-21 NOTE — ED TRIAGE NOTES
Pt came in for a doctors appointment and states the doctor is not here and needs meds to keep from going in withdrawals.  Pt states he needs an appointment made to dr Roe or Dr Dawn     Triage Assessment     Row Name 02/21/23 0906       Triage Assessment (Adult)    Airway WDL WDL       Respiratory WDL    Respiratory WDL WDL       Skin Circulation/Temperature WDL    Skin Circulation/Temperature WDL WDL       Cardiac WDL    Cardiac WDL WDL       Peripheral/Neurovascular WDL    Peripheral Neurovascular WDL WDL       Cognitive/Neuro/Behavioral WDL    Cognitive/Neuro/Behavioral WDL WDL

## 2023-02-21 NOTE — TELEPHONE ENCOUNTER
Phone call to pharmacy. Patient already has an active PA for Suboxone tabs, so pharmacy will fill for tablets. (Suboxone films would require a separate PA, delaying rx .)    Prior authorization required for acamprosate. PA request sent to the PA team in separate telephone encounter.    Routing to Emily SHELL.    Rwoena Marques RN on 2/21/2023 at 2:06 PM     Family

## 2023-02-22 NOTE — PROGRESS NOTES
[facility]    Peer  met with Justus Cope in the Recovery Clinic to introduce himself, detail services provided and discuss current status of recovery. Pt appeared alert, oriented and open to feedback during our discussion.     Pt arrives for visit with provider for suboxone refill.  Pt reports taking suboxone as prescribed by the provider wilian addresses urges and cravings.   Pt reports opioid recovery is going well with last use being in 2015.  Pt reports he is more aware of his alcohol consumption.  PRC commends pt and discussed how doing so can have cross benefits to other areas of our daily living such as better focus and clarity on a consistent basis as well as having more energy.    Pt reports he completed a comprehensive assessment for alcohol substance use with [facility].   Pt reports being scheduled to begin IOP this evening with an unknown provider located in [place], [place].    Pt states selecting attending in-person sessions vs vitual given the personal face to face interactions.   PRC commends pt on completion of the SUA and encouraged him to engage in the groups, being open and honest about substance use and welcome to feedback from staff and other group members.     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 Pt remains in apartment living with his significant other and their dog, reporting it is a safe and supportive environment.      PRC commends pt on his focus to attending IOP and making scheduled visits with providers. PRCwelcomes contact for recovery based support and resources. PRC and pt agree to speak again during an upcoming  visit.          Service Type:     Individual     Session Start Time:        10:15 am                 Session End Time:          10:30 am    Session Length:        15 minutes     Patient Goal:   To utilize suboxone assisted treatment for sobriety  and long term recovery.   Pt begins IOP with goal of completion as scheduled by the counseling staff.    Goal Progress:   Ongoing.    Key Risk Factors to Recovery:   PRC encourages being aware of risk factors that can lead to re-use which include avoiding isolation, avoiding triggers and managing cravings in a healthy manner. being open and willing to acceptance and change on a daily basis.  PRC encourages pt to establish a sober network calling tree to reach out to when needed.  Continue to practice honesty with ourselves and trusted support person(s).   PRC encourages regular attendance at recovery based meetings as well as finding a sponsor for mentoring and accountability.   PRC encourages consideration of other services such as counseling for mental health issues which can correlate with our substance use.      Support Needs:   Ongoing care, support and resources for opioid substance use disorder.     Follow up:   [facility] has provided pt with his contact information for support and resource needs.    PRC and pt agree to meet during an upcoming  visit.       [facility]  [address], [address]   [place], [place], [address]  Clinic Phone: [phone]  Clinic Fax: [phone]  Peer  phone: [phone]    Open Monday - Friday  9:00am-4:00pm  Walk in hours: 9am-3pm      Shashank Vásquez  February 22, 2023  8:25 AM    [facility] provides clinical oversite and supervision of care.

## 2023-02-23 LAB
PLPETH BLD-MCNC: 826 NG/ML
POPETH BLD-MCNC: 1373 NG/ML

## 2023-02-27 ENCOUNTER — TELEPHONE (OUTPATIENT)
Dept: BEHAVIORAL HEALTH | Facility: CLINIC | Age: 53
End: 2023-02-27
Payer: COMMERCIAL

## 2023-02-27 NOTE — TELEPHONE ENCOUNTER
Prior Authorization Retail Medication Request    Incoming request from Cover My Meds    Medication/Dose:   acamprosate (CAMPRAL) 333 MG EC tablet 180 tablet 0 2/21/2023  --   Sig - Route: Take 2 tablets (666 mg) by mouth 3 times daily - Oral     Previously Tried and Failed:    Rationale:      Insurance Name:    [employer] PMAP AND [employer]   Cvg status: OWEN-Verified   Effective from: 4/1/2020   Subscriber: LANDON WEN     Insurance ID:        Pharmacy Information (if different than what is on RX)  Name:    [facility]  [address] [place] [id number]  Phone:  [phone]    Alicia Vidal RN on 2/27/2023 at 4:59 PM

## 2023-03-02 NOTE — TELEPHONE ENCOUNTER
Central Prior Authorization Team  Phone: [phone]    PA Initiation    Medication: acamprosate (CAMPRAL) 333 MG EC tablet  Insurance Company: [employer] - Phone [phone] Fax [phone]  Pharmacy Filling the Rx: [facility] - [address]  Filling Pharmacy Phone: [phone]  Filling Pharmacy Fax:    Start Date: 3/2/2023

## 2023-03-02 NOTE — TELEPHONE ENCOUNTER
PRIOR AUTHORIZATION DENIED    Medication: acamprosate (CAMPRAL) 333 MG EC tablet- DENIED     Denial Date: 3/2/2023    Denial Rational: The plan requires a trial of naltrexone before acamprosate can be considered.    Appeal Information: If provider would like to appeal please provide a letter of medical necessity.

## 2023-03-02 NOTE — TELEPHONE ENCOUNTER
Acamprosate PA denied indicating pt must trial naltrexone first.    Routing to  BARR[place] amaya and Emily Trujillo.

## 2023-03-02 NOTE — TELEPHONE ENCOUNTER
Naltrexone CANNOT be used. Patient is currently prescribed and taking Suboxone.     CARLOS Fernandes CNP on 3/2/2023 at 3:52 PM

## 2023-03-02 NOTE — TELEPHONE ENCOUNTER
Received a repeat request for PA. PA team has now submitted.    Deann Flores RN on 3/2/2023 at 2:41 PM

## 2023-03-06 NOTE — TELEPHONE ENCOUNTER
Prior Authorization Approval    Authorization Effective Date: 3/3/2023  Authorization Expiration Date: 3/2/2024  Medication: acamprosate (CAMPRAL) 333 MG EC tablet- APPROVED   Approved Dose/Quantity:   Reference #:     Insurance Company: [employer] - Phone [phone] Fax [phone]  Expected CoPay:       CoPay Card Available:      Foundation Assistance Needed:    Which Pharmacy is filling the prescription (Not needed for infusion/clinic administered): [facility] - [place], [place] - [address] [place] [address] [address]  Pharmacy Notified: Yes  Patient Notified:  **Instructed pharmacy to notify patient when script is ready to /ship.**

## 2023-03-06 NOTE — TELEPHONE ENCOUNTER
Left general voicemail for patient informing him that prior authorization for medication was approved and he should follow up with pharmacy if he hasn't done so yet.    Rowena Marques RN on 3/6/2023 at 2:35 PM

## 2023-03-06 NOTE — TELEPHONE ENCOUNTER
Campral has been approved. Please call and let patient know.     CARLOS Fernandes CNP on 3/6/2023 at 9:14 AM

## 2023-03-07 ENCOUNTER — OFFICE VISIT (OUTPATIENT)
Dept: BEHAVIORAL HEALTH | Facility: CLINIC | Age: 53
End: 2023-03-07
Payer: COMMERCIAL

## 2023-03-07 ENCOUNTER — TELEPHONE (OUTPATIENT)
Dept: BEHAVIORAL HEALTH | Facility: CLINIC | Age: 53
End: 2023-03-07

## 2023-03-07 VITALS — DIASTOLIC BLOOD PRESSURE: 91 MMHG | OXYGEN SATURATION: 96 % | HEART RATE: 108 BPM | SYSTOLIC BLOOD PRESSURE: 131 MMHG

## 2023-03-07 DIAGNOSIS — F10.20 ALCOHOL USE DISORDER, MODERATE, DEPENDENCE (H): ICD-10-CM

## 2023-03-07 DIAGNOSIS — F11.20 OPIOID USE DISORDER, SEVERE, DEPENDENCE (H): Primary | ICD-10-CM

## 2023-03-07 DIAGNOSIS — F13.90 BENZODIAZEPINE MISUSE: ICD-10-CM

## 2023-03-07 PROCEDURE — 99214 OFFICE O/P EST MOD 30 MIN: CPT | Performed by: NURSE PRACTITIONER

## 2023-03-07 RX ORDER — BUPRENORPHINE HYDROCHLORIDE AND NALOXONE HYDROCHLORIDE DIHYDRATE 2; .5 MG/1; MG/1
1 TABLET SUBLINGUAL 2 TIMES DAILY
Qty: 30 TABLET | Refills: 0 | Status: SHIPPED | OUTPATIENT
Start: 2023-03-07 | End: 2023-03-21

## 2023-03-07 RX ORDER — CLONIDINE HYDROCHLORIDE 0.1 MG/1
0.1 TABLET ORAL 2 TIMES DAILY PRN
Qty: 90 TABLET | Refills: 0 | COMMUNITY
Start: 2023-03-07 | End: 2023-06-13

## 2023-03-07 RX ORDER — GABAPENTIN 300 MG/1
300 CAPSULE ORAL 2 TIMES DAILY PRN
Qty: 60 CAPSULE | Refills: 0 | COMMUNITY
Start: 2023-03-07 | End: 2023-06-13

## 2023-03-07 RX ORDER — BUPRENORPHINE AND NALOXONE 2; .5 MG/1; MG/1
1 FILM, SOLUBLE BUCCAL; SUBLINGUAL 2 TIMES DAILY
Qty: 30 FILM | Refills: 0 | Status: SHIPPED | OUTPATIENT
Start: 2023-03-07 | End: 2023-03-23

## 2023-03-07 ASSESSMENT — PATIENT HEALTH QUESTIONNAIRE - PHQ9: SUM OF ALL RESPONSES TO PHQ QUESTIONS 1-9: 0

## 2023-03-07 NOTE — PROGRESS NOTES
[facility] - Recovery Clinic Follow Up    ASSESSMENT/PLAN                                                      1. Opioid use disorder, severe, dependence (H)  - Controlled. Continue Suboxone 2 mg BID.   - Plans to start outpatient programming  - buprenorphine HCl-naloxone HCl (SUBOXONE) 2-0.5 MG per film; Place 1 Film under the tongue 2 times daily  Dispense: 30 Film; Refill: 0    2. Alcohol use disorder, moderate, dependence (H)  - Start Campral 666 mg po tid (approved by insurance)   - Recheck PEth at follow up in 2 weeks (monitor for 80% reduction, goal, by checking once monthly).   - Start outpatient programming   - OK to continue Gabapentin and clonidine as needed, see below.   - gabapentin (NEURONTIN) 300 MG capsule; Take 1 capsule (300 mg) by mouth 2 times daily as needed for other (alcohol cravings or withdrawal symptoms)  Dispense: 60 capsule; Refill: 0  - cloNIDine (CATAPRES) 0.1 MG tablet; Take 1 tablet (0.1 mg) by mouth 2 times daily as needed (anxiety, high bp)  Dispense: 90 tablet; Refill: 0    3. Benzodiazepine misuse  - No recent use per pt, monitor.   - Start outpatient programming      Return in about 2 weeks (around 3/21/2023) for Follow up, with any available provider, in person.     Criteria to return to [facility] clinic.    - Needs to demonstrate consistency with provider visits (minimum 3 scheduled visits consecutively - cancellations and rescheduling at least 24hr in advance are acceptable)  - Ongoing attendance at [facility] programming for minimum 1 month  - Significant reduction in alcohol (PeTH testing showing 80% reduction or more) or full abstinence    SUBJECTIVE                                                        CC/HPI:  Justus Cope is a 52 year old male with PMH neck pain, depression with anxiety, moderate alcohol use disorder, tobacco dependence, and opioid dependence  who presents to the Recovery Clinic for follow up. Pt was referred by Dr. Shimon Arciniega in Addiction  "medication.      First Recovery Clinic visit:  7/21/22     Brief history of use:   Previously following with Dr. Arciniega in Addiction medicine for ongoing management of opioid dependence and alcohol use disorder moderate. At initial visit pt reports he has continue Suboxone despite being \"out\" for 2 months. He had been breaking each tablet into multiple pieces and taking it once per day. He will be a couple days between doses. Denies recent full opioid agonist use. Reports he was taking percocet when he was living in [place]. He is currently working full time at 2 different jobs, [employer] and as a PCA. Reports he receives alcohol from his job. Reports drinking about 3 beers every other day. His goal is NOT abstinence. He has been previously positive for benzo's in the past. No prescribed this medication. He has been going to . \"I know my drinking is problematic.\"      Substance Use History :  Opioids:   Age at first use: [birth year]2010  Current use: timing of last use: 2015; substance: prescribed pain meds; route: oral and snort                 IV drug use: No   History of overdose: No  Previous treatments : No  Longest period of sobriety: currently   Medical complications related to substance use: denies  Hepatitis C: negative per pt; no recent screening found   HIV: negative per pt; no recent screening found     Other Addiction History:  Stimulants:   Past use: denies  Use in last 6 months: denies  Sedatives/hypnotics/anxiolytics:   Past use: denies  Use in last 6 months: denies - UDS positive for BZO on 7/21/22  Alcohol:   Past use: occasional   Use in last 6 months: 2-4 beers every other day  Nicotine:   Cigarettes: 1 pack daily  Vaping: denies  Chewing tobacco: denies  Cannabis:   Past use: occasionally  Use in last 6 months: denies  Hallucinogens:   Past use: not for over 25 years  Use in last 6 months: denies  Behavioral addictions:   denies     PAST PSYCHIATRIC HISTORY:  Diagnoses- depression with " anxiety   Suicide Attempts: No   Hospitalizations: No      Social History  Housing status: with girlfriend  Employment status: Employed full time  Relationship status: Partnered  Children: 1 child, 26 YO  Legal: denies  Insurance needs: active  Contact information up to date? yes      Most recent Recovery Clinic visit 2/21/23   A/P last visit:  1. Opioid use disorder, severe, dependence (H)  Controlled. No recent use or cravings. Continue Suboxone 2 mg SL BID unchanged.   Plans to start outpatient programming.   - buprenorphine HCl-naloxone HCl (SUBOXONE) 2-0.5 MG per film; Place 1 Film under the tongue 2 times daily  Dispense: 28 Film; Refill: 0     2. Alcohol use disorder, moderate, dependence (H)  - Uncontrolled. Last use 2/17/23. Continue clonidine and gabapentin as needed. No refills needed today.   - Plans to start outpatient programming, strongly encouraged this.   - Check PEth today and monitor monthly. Monitoring for at least 80% reduction.   - Start Campral 666 mg PO TID for cravings.   - Phosphatidylethanol (PEth), Whole Blood; Future  - acamprosate (CAMPRAL) 333 MG EC tablet; Take 2 tablets (666 mg) by mouth 3 times daily  Dispense: 180 tablet; Refill: 0     3. Benzodiazepine misuse  - Return to benzodiazapine use in the setting of withdrawal from Suboxone when pt ran out of medication. Recommended abstinence. Reviewed risks of benzodiazapine use especially in combination with alcohol use and buprenorphine. Pt verbalizes understanding.   - Plans to start outpatient programming.      Reviewed with patient Dr. Arciniega's referral back to  and criteria to return to [facility] clinic.    - Needs to demonstrate consistency with provider visits (minimum 3 scheduled visits consecutively - cancellations and rescheduling at least 24hr in advance are acceptable)  - Ongoing attendance at [facility] programming for minimum 1 month  - Significant reduction in alcohol (PeTH testing showing 80% reduction or more) or full  abstinence    03/07/23 visit:  - Reports last alcohol use as 2 days ago, has reduced alcohol use, drinking 2-3 beers per day. Reports he has been taking gabapentin and clonidine as needed. Since he stopped drinking  - Has not yet started Campral, was waiting on PA, now approved.   - UDS positive for benzodiazapine, no other recent use other than when he took it about 2 weeks ago to try and help with withdrawal from Suboxone.   - Taking Suboxone as prescribed, 2 mg BID. No opioid use in 15 years. No adverse SE.   - Has not yet started treatment, will be going to [place] for outpatient in person. Working with someone named Amanda to coordinate intake date.     [place] Prescription Drug Monitoring Program Reviewed:  Yes  02/21/2023  1   02/21/2023  Buprenorphine-Nalox 2-0.5mg Tb  28.00  14       Medications:  cloNIDine (CATAPRES) 0.1 MG tablet, Take 1 tablet (0.1 mg) by mouth 2 times daily as needed (anxiety, high bp)  gabapentin (NEURONTIN) 300 MG capsule, Take 1 capsule (300 mg) by mouth 2 times daily as needed for other (alcohol cravings or withdrawal symptoms)  naloxone (NARCAN) 4 MG/0.1ML nasal spray, Spray 4 mg into one nostril alternating nostrils as needed for opioid reversal every 2-3 minutes until assistance arrives  acamprosate (CAMPRAL) 333 MG EC tablet, Take 2 tablets (666 mg) by mouth 3 times daily (Patient not taking: Reported on 3/7/2023)    No current facility-administered medications on file prior to visit.      No Known Allergies    PMH, PSH, FamHx reviewed    OBJECTIVE                                                      BP (!) 131/91   Pulse 108   SpO2 96%   PHQ 2/2/2023 2/21/2023 3/7/2023   PHQ-9 Total Score 2 0 0   Q9: Thoughts of better off dead/self-harm past 2 weeks Not at all Not at all Not at all         Physical Exam  Vitals and nursing note reviewed.   Constitutional:       General: He is not in acute distress.     Appearance: Normal appearance.   Eyes:      Extraocular Movements:  Extraocular movements intact.   Pulmonary:      Effort: Pulmonary effort is normal.   Neurological:      Mental Status: He is alert and oriented to person, place, and time.   Psychiatric:         Attention and Perception: Attention normal.         Mood and Affect: Mood and affect normal.         Speech: Speech normal.         Behavior: Behavior is cooperative.         Thought Content: Thought content normal.      Comments: Insight and judgment fair          Labs:    UDS:  03/07/23  Urine Drug Screen Results  AMP: Negative  BAR: Negative  BUP: Positive  BZO: Positive  SHIRA: Negative  mAMP: Negative  MDMA : Negative  MTD: Negative  [id number]: Negative  OXY: Negative  PCP : Negative  THC : Negative  *POC urine drug screen does not screen for Fentanyl  - denies recent use since last visit on 2/21/23     No results found for this or any previous visit (from the past 240 hour(s)).  Component      Latest Ref Rng & Units 2/21/2023   PEth 16:0/18:1 (POPEth)      ng/mL 1373   PEth 16:0/18:2 (PLPEth)      ng/mL 826       Patient counseling completed today:  Discussed mechanism of action, potential risks/benefits/side effects of medications and other recommendations above.  Recommend pt keep naloxone in their possession and reviewed other aspects of harm reduction to reduce risk of overdose with return to use.   Recommended avoiding concurrent use of alcohol, benzodiazepines or other sedatives with buprenorphine or other opioids.  Discussed importance of avoiding isolation, building a network of supportive relationships, avoiding people/places/things associated with past use to reduce risk of relapse; including motivational interviewing regarding psychosocial treatment for addiction.       CARLOS Fernandes CNP  [facility] [id number] [place] [id number] [phone]

## 2023-03-07 NOTE — TELEPHONE ENCOUNTER
Writer notified patient via telephone that a new prescription for Suboxone tablets has been routed to the pharmacy.    Alicia Vidal RN on 3/7/2023 at 4:32 PM

## 2023-03-07 NOTE — NURSING NOTE
[facility]      Rooming information:  Approximate last use of full opioid agonist: 15 years ago  Taking buprenorphine? Yes:  As prescribed? Yes:   Number of buprenorphine films/tablets remaining currently: 0. Missed an appointment.    Side effects related to buprenorphine (constipation, dry mouth, sedation?) No   Narcan currently available: Yes  Other recent substance use:    Alcohol. Denies anxiolytics   NICOTINE-Yes: Smoke  If using nicotine, ready to quit? No    Point of care urine drug screen positive for:  Urine Drug Screen Results  AMP: Negative  BAR: Negative  BUP: Positive  BZO: Positive  SHIRA: Negative  mAMP: Negative  MDMA : Negative  MTD: Negative  WJP432: Negative  OXY: Negative  PCP : Negative  THC : Negative  *POC urine drug screen does not screen for Fentanyl        PHQ Assesment Total Score(s) 3/7/2023   PHQ-9 Score 0   Some recent data might be hidden       If PHQ-9 score of 15 or higher, has Recovery Clinic therapist or provider been notified? N/A    Any current suicidal ideation? No  If yes, has Recovery Clinic therapist or provider been notified? N/A    Primary care provider: Physician No Ref-Primary     Mental health provider: None (follow up on MH referral if needed)    Insurance needs: Active    Housing needs: Stable    Contact information up to date? Yes    3rd Party Involvement: None today (please obtain ROBBIE if pt would like to include)    Deann Flores RN  March 7, 2023  9:07 AM

## 2023-03-07 NOTE — TELEPHONE ENCOUNTER
Writer spoke with the pharmacy who reports patient needs a prior authorization for Suboxone films and patient prefers tablets. Writer inquired regarding last prescription for Suboxone films and if insurance covered them, pharmacy reports they received a verbal order from staff at the clinic and that patient did indeed receive tablets and not films. Pharmacist would like a new script sent to the pharmacy for Suboxone Tablets.     Order pended and routed to provider to further assist.     Alicia Vidal RN on 3/7/2023 at 3:50 PM

## 2023-03-07 NOTE — TELEPHONE ENCOUNTER
Reason for Call:  Other prescription    Detailed comments: Pt left vm stating it is an emergency. Pt will be going out of town tonight. States the wrong suboxone rx was sent, films instead of tablets. Pt requesting a call back from RN.    Phone Number Patient can be reached at: [phone]    Best Time: any  Can we leave a detailed message on this number? YES    Call taken on 3/7/2023 at 3:24 PM by Salud Britton

## 2023-03-21 ENCOUNTER — TELEPHONE (OUTPATIENT)
Dept: BEHAVIORAL HEALTH | Facility: CLINIC | Age: 53
End: 2023-03-21

## 2023-03-21 DIAGNOSIS — F11.20 OPIOID USE DISORDER, SEVERE, DEPENDENCE (H): ICD-10-CM

## 2023-03-21 NOTE — TELEPHONE ENCOUNTER
Date of Last Office Visit: 03/07/15  Date of Next Office Visit: 03/23/23  No shows since last visit: None  Cancellations since last visit: None    Medication requested:   buprenorphine-naloxone (SUBOXONE) 2-0.5 MG SUBL sublingual tablet 30 tablet 0 3/7/2023  --   Sig - Route: Place 1 tablet under the tongue 2 times daily - Sublingual      Date last ordered: 03/07/23 Qty: 30 Refills: 0     [place] of [place] ?: Yes  Medication last filled date: 03/07/23 Qty filled: 30  Other controlled substance on [place] ?: Yes  If yes, is this a new medication?: No    Lapse in medication adherence greater than 5 days?: No  If yes, call patient and gather details: See note earlier in the encounter,.  Medication refill request verified as identical to current order?: No, qty decreased due to bridge request.  Result of Last DAM, VPA, Li+ Level, CBC, or Carbamazepine Level (at or since last visit): N/A    Last visit treatment plan:     1. Opioid use disorder, severe, dependence (H)  - Controlled. Continue Suboxone 2 mg BID.   - Plans to start outpatient programming  - buprenorphine HCl-naloxone HCl (SUBOXONE) 2-0.5 MG per film; Place 1 Film under the tongue 2 times daily  Dispense: 30 Film; Refill: 0     2. Alcohol use disorder, moderate, dependence (H)  - Start Campral 666 mg po tid (approved by insurance)   - Recheck PEth at follow up in 2 weeks (monitor for 80% reduction, goal, by checking once monthly).   - Start outpatient programming   - OK to continue Gabapentin and clonidine as needed, see below.   - gabapentin (NEURONTIN) 300 MG capsule; Take 1 capsule (300 mg) by mouth 2 times daily as needed for other (alcohol cravings or withdrawal symptoms)  Dispense: 60 capsule; Refill: 0  - cloNIDine (CATAPRES) 0.1 MG tablet; Take 1 tablet (0.1 mg) by mouth 2 times daily as needed (anxiety, high bp)  Dispense: 90 tablet; Refill: 0     3. Benzodiazepine misuse  - No recent use per pt, monitor.   - Start outpatient programming                  Return in about 2 weeks (around 3/21/2023) for Follow up, with any available provider, in person.      Criteria to return to [facility] clinic.    - Needs to demonstrate consistency with provider visits (minimum 3 scheduled visits consecutively - cancellations and rescheduling at least 24hr in advance are acceptable)  - Ongoing attendance at [facility] programming for minimum 1 month  - Significant reduction in alcohol (PeTH testing showing 80% reduction or more) or full abstinence    []Medication refilled per  Medication Refill in Ambulatory Care  policy.  [x]Medication unable to be refilled by RN due to criteria not met as indicated below:    []Eligibility - not seen in the last year   []Supervision - no future appointment   []Compliance - no shows, cancellations or lapse in therapy   []Verification - order discrepancy   []Controlled medication   [x]Medication not included in policy   []90-day supply request   []Other

## 2023-03-21 NOTE — TELEPHONE ENCOUNTER
Reason for Call:  Other appointment    Detailed comments: Anel, pt's significant other left vm at [facility]. States pt may not show up to today's visit with Emily as pt may not have transportation. Significant currently has the car. Wants care team to know pt is not missing appt on purpose. Requesting a bridge on behalf of pt. Consent to communicate on file for caller, if staff needs to reach her.        Phone Number Patient can be reached at: [phone]      Call taken on 3/21/2023 at 8:56 AM by Salud Britton

## 2023-03-21 NOTE — TELEPHONE ENCOUNTER
MIGUEL AM for patient to call clinic and encouraged him to come to the clinic tomorrow during walk-in hours. artandseek message also sent.    [facility]  [address], [address]   [place], [place], [address]  Phone: [phone]  Fax: [phone]    Open Monday-Friday  Closed over lunch hour  Walk in hours: 9am-11:30am and 12:30-3pm    Rowena Marques RN on 3/21/2023 at 9:30 AM

## 2023-03-21 NOTE — TELEPHONE ENCOUNTER
Reason for Call:  Other prescription    Detailed comments: Returned pt's call. Per pt, his fiance is stuck in [place] with their car. Pt did not have transportation to get to appt. Unable to present for walk in tomorrow due to work. Reschedule for in person 3/23 at 1:30 with Latrice Cohen. Pt requesting sub[facility]one bridge [facility]. States he took last dose yesterday. Pt would like to ensure that refills are suboxone tablets not film, as he has had some issue with pharmacy and insurance.  Writer informed pt this is just a request and not guaranteed.    Pharmacy: [facility] - [facility] [address]    Phone Number Patient can be reached at: Cell number on file:    Telephone Information:   Mobile [phone]       Best Time: Any    Can we leave a detailed message on this number? YES    Call taken on 3/21/2023 at 3:03 PM by Salud Britton

## 2023-03-22 RX ORDER — BUPRENORPHINE HYDROCHLORIDE AND NALOXONE HYDROCHLORIDE DIHYDRATE 2; .5 MG/1; MG/1
1 TABLET SUBLINGUAL 2 TIMES DAILY
Qty: 5 TABLET | Refills: 0 | Status: SHIPPED | OUTPATIENT
Start: 2023-03-22 | End: 2023-03-23

## 2023-03-22 NOTE — TELEPHONE ENCOUNTER
Suboxone rx bridge request has been handled in another encounter. No further action needed, writer closing out encounter.     Alicia Vidal RN on 3/22/2023 at 1:18 PM

## 2023-03-22 NOTE — TELEPHONE ENCOUNTER
Writer notified patient via Need[employer] message that the Suboxone rx bridge that was requested by the patient has been sent to the pharmacy. Reminded patient of upcoming ppointment in the Recovery Clinic on 03/23/23 @1:30 pm.     [address], [address]   [place], [place], [address]  Phone: [phone]  Fax: [phone]    Open Monday-Friday  Closed over lunch hour  Walk in hours: 9am-11:30am and 12:30-3pm    Alicia Vidal RN on 3/22/2023 at 1:23 PM

## 2023-03-23 ENCOUNTER — OFFICE VISIT (OUTPATIENT)
Dept: BEHAVIORAL HEALTH | Facility: CLINIC | Age: 53
End: 2023-03-23
Payer: COMMERCIAL

## 2023-03-23 ENCOUNTER — LAB (OUTPATIENT)
Dept: LAB | Facility: CLINIC | Age: 53
End: 2023-03-23
Payer: COMMERCIAL

## 2023-03-23 VITALS — DIASTOLIC BLOOD PRESSURE: 80 MMHG | HEART RATE: 85 BPM | SYSTOLIC BLOOD PRESSURE: 139 MMHG

## 2023-03-23 DIAGNOSIS — F10.20 ALCOHOL USE DISORDER, MODERATE, DEPENDENCE (H): ICD-10-CM

## 2023-03-23 DIAGNOSIS — Z51.81 ENCOUNTER FOR MONITORING OPIOID MAINTENANCE THERAPY: ICD-10-CM

## 2023-03-23 DIAGNOSIS — Z87.898 HISTORY OF BENZODIAZEPINE USE: ICD-10-CM

## 2023-03-23 DIAGNOSIS — Z79.891 ENCOUNTER FOR MONITORING OPIOID MAINTENANCE THERAPY: ICD-10-CM

## 2023-03-23 DIAGNOSIS — F11.20 OPIOID USE DISORDER, SEVERE, DEPENDENCE (H): Primary | ICD-10-CM

## 2023-03-23 LAB
AMPHETAMINE QUAL URINE POCT: NEGATIVE
BARBITURATE QUAL URINE POCT: NEGATIVE
BENZODIAZ UR QL SCN: ABNORMAL
BENZODIAZEPINE QUAL URINE POCT: ABNORMAL
BUPRENORPHINE QUAL URINE POCT: ABNORMAL
COCAINE QUAL URINE POCT: NEGATIVE
CREATININE QUAL URINE POCT: ABNORMAL
INTERNAL QC QUAL URINE POCT: ABNORMAL
MDMA QUAL URINE POCT: NEGATIVE
METHADONE QUAL URINE POCT: NEGATIVE
METHAMPHETAMINE QUAL URINE POCT: NEGATIVE
OPIATE QUAL URINE POCT: NEGATIVE
OXYCODONE QUAL URINE POCT: NEGATIVE
PH QUAL URINE POCT: ABNORMAL
PHENCYCLIDINE QUAL URINE POCT: NEGATIVE
POCT KIT EXPIRATION DATE: ABNORMAL
POCT KIT LOT NUMBER: ABNORMAL
SPECIFIC GRAVITY POCT: 1.02
TEMPERATURE URINE POCT: ABNORMAL
THC QUAL URINE POCT: NEGATIVE

## 2023-03-23 PROCEDURE — 99215 OFFICE O/P EST HI 40 MIN: CPT | Performed by: NURSE PRACTITIONER

## 2023-03-23 PROCEDURE — H0038 SELF-HELP/PEER SVC PER 15MIN: HCPCS | Mod: U5

## 2023-03-23 PROCEDURE — 80321 ALCOHOLS BIOMARKERS 1OR 2: CPT

## 2023-03-23 PROCEDURE — 36415 COLL VENOUS BLD VENIPUNCTURE: CPT

## 2023-03-23 PROCEDURE — 80307 DRUG TEST PRSMV CHEM ANLYZR: CPT | Performed by: NURSE PRACTITIONER

## 2023-03-23 RX ORDER — BUPRENORPHINE HYDROCHLORIDE AND NALOXONE HYDROCHLORIDE DIHYDRATE 2; .5 MG/1; MG/1
1 TABLET SUBLINGUAL 2 TIMES DAILY
Qty: 5 TABLET | Refills: 0 | Status: SHIPPED | OUTPATIENT
Start: 2023-03-23 | End: 2023-03-27

## 2023-03-23 RX ORDER — DIGITAL THERAPEUTICS, SUD
MISCELLANEOUS MISCELLANEOUS
Qty: 1 EACH | Refills: 3 | Status: ON HOLD | OUTPATIENT
Start: 2023-03-23 | End: 2023-04-13

## 2023-03-23 ASSESSMENT — PATIENT HEALTH QUESTIONNAIRE - PHQ9: SUM OF ALL RESPONSES TO PHQ QUESTIONS 1-9: 0

## 2023-03-23 NOTE — NURSING NOTE
[facility]      Rooming information:  Approximate last use of full opioid agonist: 15 yrs ago  Taking buprenorphine? Yes but ran out:  As prescribed? Yes:   Number of buprenorphine films/tablets remaining currently: 0  Side effects related to buprenorphine (constipation, dry mouth, sedation?) No   Narcan currently available: Yes  Other recent substance use:    Alcohol last use 03/22/23  NICOTINE-Yes: Smoke  If using nicotine, ready to quit? No    Point of care urine drug screen positive for:  Lab Results   Component Value Date    BUP Screen Positive (A) 03/23/2023    BZO Screen Positive (A) 03/23/2023    BAR Negative 03/23/2023    SHIRA Negative 03/23/2023    MAMP Negative 03/23/2023    AMP Negative 03/23/2023    MDMA Negative 03/23/2023    MTD Negative 03/23/2023    PDB985 Negative 03/23/2023    OXY Negative 03/23/2023    PCP Negative 03/23/2023    THC Negative 03/23/2023    TEMP 94 F 03/23/2023    SGPOCT 1.025 03/23/2023   94    *POC urine drug screen does not screen for Fentanyl      PHQ Assesment Total Score(s) 3/23/2023   PHQ-9 Score 0   Some recent data might be hidden       If PHQ-9 score of 15 or higher, has Recovery Clinic therapist or provider been notified? No    Any current suicidal ideation? No  If yes, has [facility] therapist or provider been notified? N/A    Primary care provider: Physician No Ref-Primary     Mental health provider: None (follow up on MH referral if needed)    Insurance needs: Active    Housing needs: Stable    Contact information up to date? Yes    3rd Party Involvement None today (please obtain ROBBIE if pt would like to include)    Genaro Cagle LPN  March 23, 2023  1:40 PM

## 2023-03-23 NOTE — PROGRESS NOTES
"[facility]    Peer  met with Justus Cope in the Recovery Clinic to introduce himself, detail services provided and discuss current status of recovery. Pt appeared alert, oriented and open to feedback during our discussion.     Pt arrives for visit with provider for suboxone refill.  Pt reports taking suboxone as prescribed by the provider with reports it successfully addresses urges and cravings.   Pt reports opioid recovery is going well with last use being in 2015.      Pt reports missing a scheduled RC appointment this week due to mechanical issues with his car.  Pt reports noticing a difference within himself when not taking suboxone for a couple of days. Pt contacted provider and was able to  a bridge which was successful in staving off the urge to seek other avenues. PRC commends pt on his focus in doing the right thing in this situaion.     PRC and pt discussed his coming to the realization he needs to discontinue alcohol consumption.  Pt reports beng made aware of it from recent SUA recommendations and gila discussions with his significant other whom he resides in an apartment together.    Pt reports making a decision to forego IOP as recommended by SUA  and instead will seek a detox stay followed by focused period of abstinence.   Pt reports doing online research resulting in a detox program in [place] (facility name unknown) focused on body mind and spiritual healing during the detoxication process.  PRC advised being unaware of this facility and reccommends pt consider [facility] in [place] as an alternative location.  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  PRC advised most residential programs begin with a 30 day stay and encouraged pt to remain open to admission to one as a base to gain traction in his sobriety.    PRC also encouraged pt " to be open to attending IOP following his detox experience.    PRC and pt discussed how he has come around to the realization that alcohol consumption on an every day basis is problematic.     PRC commends pt on taking steps to address the issue, and encouraged an open mind to treatments and recovery based meetings and working with a sponsor as an additional means of support.   PRCwelcomes contact for recovery based support and resources. PRC and pt agree to speak again during an upcoming RC visit.       Service Type:     Individual     Session Start Time:     1:30 pm                    Session End Time:       1:45 pm    Session Length:         15 minutes    Patient Goal:   To utilize suboxone assisted treatment for sobriety and long term recovery.   Admission to a detox facility for alcohol   Consider IOP and residential programs.   Consider recovery based meetings as support    Goal Progress:   Ongoing.    Key Risk Factors to Recovery:   PRC encourages being aware of risk factors that can lead to re-use which include avoiding isolation, avoiding triggers and managing cravings in a healthy manner. being open and willing to acceptance and change on a daily basis.  PRC encourages pt to establish a sober network calling tree to reach out to when needed.  Continue to practice honesty with ourselves and trusted support person(s).   PRC encourages regular attendance at recovery based meetings as well as finding a sponsor for mentoring and accountability.   PRC encourages consideration of other services such as counseling for mental health issues which can correlate with our substance use.      Support Needs:   Ongoing care, support and resources for opioid substance use disorder.     Follow up:   PRC has provided pt with his contact information for support and resource needs.    PRC and pt agree to meet during an upcoming RC visit.       [facility]  [address] [place] [address], [address]   [facility]  [place], [address]  Clinic Phone: [phone]  Clinic Fax: [phone]  Peer  phone: [phone]    Open Monday - Friday  9:00am-4:00pm  Walk in hours: 9am-3pm      Shashank Vásquez  March 23, 2023  3:32 PM    [facility] provides clinical oversite and supervision of care.

## 2023-03-23 NOTE — PROGRESS NOTES
"[facility] Recovery Clinic Follow Up    ASSESSMENT/PLAN                                                      1. Opioid use disorder, severe, dependence (H)  -Controlled despite ongoing alcohol use. Continue suboxone 4 mg TDD.  - buprenorphine-naloxone (SUBOXONE) 2-0.5 MG SUBL sublingual tablet; Place 1 tablet under the tongue 2 times daily  Dispense: 5 tablet; Refill: 0    2. Alcohol use disorder, moderate, dependence (H)  Needs improvement. Reports drinking 2-3 beers, 2-3 times weekly, denies daily use. He is planning on going to inpatient detox. He has not started acamprosate yet, plans to start after detox.   -Resources for detox provided by [facility], also informed him of inpatient detox unit here at [place].   -Strongly encouraged him to reconsider IOP, and referral placed to update his assessment. Phone number provide for him to schedule.  -Encouraged AA attendance.   - Adult Mental Health  Referral; Future  - DTx Garry - Subst Use Disorder (RESET); Use as directed for 90 days.  Dispense: 1 each; Refill: 3  - Phosphatidylethanol (PEth), Whole Blood; Future    3. Encounter for monitoring opioid maintenance therapy  - Drugs of Abuse Screen Urine (POC CUPS) POCT; Standing  - Drugs of Abuse Screen Urine (POC CUPS) POCT  - Benzodiazepine qualitative urine; Future    4. History of benzodiazepine use  Denies use despite positive POC today. States he is \"always positive\". Urine sent for confirmation.   Encouraged him to follow through with plans for inpatient detox, and justine eval for CD treatment.              Return in about 2 weeks (around 4/6/2023) for Follow up, in person.    SUBJECTIVE                                                        Justus Cope is a 52 year old male with PMH neck pain, depression with anxiety, moderate alcohol use disorder, tobacco dependence, and opioid dependence  who presents to the Recovery Clinic for follow up. Pt was referred by Dr. Shimon Arciniega in Addiction " "medication.      First Recovery Clinic visit:  7/21/22     Brief history of use:   Previously following with Dr. Arciniega in Addiction medicine for ongoing management of opioid dependence and alcohol use disorder moderate. At initial visit pt reports he has continue Suboxone despite being \"out\" for 2 months. He had been breaking each tablet into multiple pieces and taking it once per day. He will be a couple days between doses. Denies recent full opioid agonist use. Reports he was taking percocet when he was living in [place]. He is currently working full time at 2 different jobs, [employer] and as a PCA. Reports he receives alcohol from his job. Reports drinking about 3 beers every other day. His goal is NOT abstinence. He has been previously positive for benzo's in the past. No prescribed this medication. He has been going to . \"I know my drinking is problematic.\"      Substance Use History :  Opioids:   Age at first use: 3/2010  Current use: timing of last use: 2015; substance: prescribed pain meds; route: oral and snort                 IV drug use: No   History of overdose: No  Previous treatments : No  Longest period of sobriety: currently   Medical complications related to substance use: denies  Hepatitis C: negative per pt; no recent screening found   HIV: negative per pt; no recent screening found     Other Addiction History:  Stimulants:   Past use: denies  Use in last 6 months: denies  Sedatives/hypnotics/anxiolytics:   Past use: denies  Use in last 6 months: denies - UDS positive for BZO on 7/21/22  Alcohol:   Past use: occasional   Use in last 6 months: 2-4 beers every other day  Nicotine:   Cigarettes: 1 pack daily  Vaping: denies  Chewing tobacco: denies  Cannabis:   Past use: occasionally  Use in last 6 months: denies  Hallucinogens:   Past use: not for over 25 years  Use in last 6 months: denies  Behavioral addictions:   denies     PAST PSYCHIATRIC HISTORY:  Diagnoses- depression with " anxiety   Suicide Attempts: No   Hospitalizations: No      Social History  Housing status: with girlfriend  Employment status: Employed full time  Relationship status: Partnered  Children: 1 child, 28 YO  Legal: denies  Insurance needs: active  Contact information up to date? yes        Most recent Recovery Clinic visit 3/7/23    - Reports last alcohol use as 2 days ago, has reduced alcohol use, drinking 2-3 beers per day. Reports he has been taking gabapentin and clonidine as needed. Since he stopped drinking  - Has not yet started Campral, was waiting on PA, now approved.   - UDS positive for benzodiazapine, no other recent use other than when he took it about 2 weeks ago to try and help with withdrawal from Suboxone.   - Taking Suboxone as prescribed, 2 mg BID. No opioid use in 15 years. No adverse SE.   - Has not yet started treatment, will be going to [place] for outpatient in person. Working with someone named Amanda to coordinate intake date.   A/P last visit:  1. Opioid use disorder, severe, dependence (H)  - Controlled. Continue Suboxone 2 mg BID.   - Plans to start outpatient programming  - buprenorphine HCl-naloxone HCl (SUBOXONE) 2-0.5 MG per film; Place 1 Film under the tongue 2 times daily  Dispense: 30 Film; Refill: 0     2. Alcohol use disorder, moderate, dependence (H)  - Start Campral 666 mg po tid (approved by insurance)   - Recheck PEth at follow up in 2 weeks (monitor for 80% reduction, goal, by checking once monthly).   - Start outpatient programming   - OK to continue Gabapentin and clonidine as needed, see below.   - gabapentin (NEURONTIN) 300 MG capsule; Take 1 capsule (300 mg) by mouth 2 times daily as needed for other (alcohol cravings or withdrawal symptoms)  Dispense: 60 capsule; Refill: 0  - cloNIDine (CATAPRES) 0.1 MG tablet; Take 1 tablet (0.1 mg) by mouth 2 times daily as needed (anxiety, high bp)  Dispense: 90 tablet; Refill: 0     3. Benzodiazepine misuse  - No recent use per pt,  "monitor.   - Start outpatient programming     03/23/23 visit:  -Drank yesterday 4 beers and a shot after his car broke down causing him to miss his appointment  -Drinking 2-3 beers 2-3 times weekly  -Denies use of benzodiazepines use despite positive POC. States he is always positive  -Wants to go to detox instead of IOP, \" I just want to get all cleared out. Denies seizures hx, Never been to treatment.  -Plans to start AA once detox completed.  -Goal is now to abstain.         Labs discussed with patient?  Yes      [place] Prescription Drug Monitoring Program Reviewed:  Yes  03/22/2023  1   03/22/2023  Buprenorphine-Nalox 2-0.5mg Tb  5.00  3 Cl Max   [id number]   Spr (0630)   0/0  3.33 mg  Medicaid [place]   03/07/2023  1   03/07/2023  Buprenorphine-Nalox 2-0.5mg Tb  30.00  15 Cl Max   [id number]   Spr (0630)   0/0  4.00 mg         Medications:  cloNIDine (CATAPRES) 0.1 MG tablet, Take 1 tablet (0.1 mg) by mouth 2 times daily as needed (anxiety, high bp)  gabapentin (NEURONTIN) 300 MG capsule, Take 1 capsule (300 mg) by mouth 2 times daily as needed for other (alcohol cravings or withdrawal symptoms)  acamprosate (CAMPRAL) 333 MG EC tablet, Take 2 tablets (666 mg) by mouth 3 times daily (Patient not taking: Reported on 3/7/2023)  naloxone (NARCAN) 4 MG/0.1ML nasal spray, Spray 4 mg into one nostril alternating nostrils as needed for opioid reversal every 2-3 minutes until assistance arrives (Patient not taking: Reported on 3/23/2023)    No current facility-administered medications on file prior to visit.      No Known Allergies    PMH, PSH, FamHx reviewed      OBJECTIVE                                                      /80   Pulse 85   PHQ 2/21/2023 3/7/2023 3/23/2023   PHQ-9 Total Score 0 0 0   Q9: Thoughts of better off dead/self-harm past 2 weeks Not at all Not at all Not at all         Physical Exam  HENT:      Head: Normocephalic.      Nose: Nose normal.   Eyes:      Conjunctiva/sclera: Conjunctivae normal. "   Cardiovascular:      Rate and Rhythm: Normal rate.   Pulmonary:      Effort: Pulmonary effort is normal.   Neurological:      General: No focal deficit present.      Mental Status: He is alert.   Psychiatric:         Attention and Perception: Attention normal.         Mood and Affect: Affect normal. Mood is anxious.         Speech: Speech normal.         Behavior: Behavior is cooperative.         Thought Content: Thought content normal.      Comments: Lacks insight         Labs:    UDS:  03/23/23  Lab Results   Component Value Date    BUP Screen Positive (A) 03/23/2023    BZO Screen Positive (A) 03/23/2023    BAR Negative 03/23/2023    SHIRA Negative 03/23/2023    MAMP Negative 03/23/2023    AMP Negative 03/23/2023    MDMA Negative 03/23/2023    MTD Negative 03/23/2023    KHO009 Negative 03/23/2023    OXY Negative 03/23/2023    PCP Negative 03/23/2023    THC Negative 03/23/2023    TEMP 94 F 03/23/2023    SGPOCT 1.025 03/23/2023     *POC urine drug screen does not screen for Fentanyl      Recent Results (from the past 240 hour(s))   Drugs of Abuse Screen Urine (POC CUPS) POCT    Collection Time: 03/23/23  1:50 PM   Result Value Ref Range    POCT Kit Lot Number [id number]     POCT Kit Expiration Date 2024-08-18     Temperature Urine POCT 94 F 90 F, 92 F, 94 F, 96 F, 98 F, 100 F    Specific [place] POCT 1.025 1.005, 1.015, 1.025    pH Qual Urine POCT 5 pH 4 pH, 5 pH, 7 pH, 9 pH    Creatinine Qual Urine POCT 100 mg/dL 20 mg/dL, 50 mg/dL, 100 mg/dL, 200 mg/dL    Internal QC Qual Urine POCT Valid Valid    Amphetamine Qual Urine POCT Negative Negative    Barbiturate Qual Urine POCT Negative Negative    Buprenorphine Qual Urine POCT Screen Positive (A) Negative    Benzodiazepine Qual Urine POCT Screen Positive (A) Negative    Cocaine Qual Urine POCT Negative Negative    Methamphetamine Qual Urine POCT Negative Negative    MDMA Qual Urine POCT Negative Negative    Methadone Qual Urine POCT Negative Negative    Opiate Qual  Urine POCT Negative Negative    Oxycodone Qual Urine POCT Negative Negative    Phencyclidine Qual Urine POCT Negative Negative    THC Qual Urine POCT Negative Negative         Patient counseling completed today:  Discussed mechanism of action, potential risks/benefits/side effects of medications and other recommendations above.  Recommend pt keep naloxone in their possession and reviewed other aspects of harm reduction to reduce risk of overdose with return to use.   Recommended avoiding concurrent use of alcohol, benzodiazepines or other sedatives with buprenorphine or other opioids.  Discussed importance of avoiding isolation, building a network of supportive relationships, avoiding people/places/things associated with past use to reduce risk of relapse; including motivational interviewing regarding psychosocial treatment for addiction.     At least 40 min spent in review of medical record,  review, obtaining histories, reviewing symptoms, discussing pt's goals for treatment, counseling noted above.    Latrice Cohen, CNP    [facility]  [address] [place] [id number]  [phone]

## 2023-03-26 LAB
PLPETH BLD-MCNC: 837 NG/ML
POPETH BLD-MCNC: 1510 NG/ML

## 2023-03-27 DIAGNOSIS — F11.20 OPIOID USE DISORDER, SEVERE, DEPENDENCE (H): ICD-10-CM

## 2023-03-27 RX ORDER — BUPRENORPHINE HYDROCHLORIDE AND NALOXONE HYDROCHLORIDE DIHYDRATE 2; .5 MG/1; MG/1
1 TABLET SUBLINGUAL 2 TIMES DAILY
Qty: 22 TABLET | Refills: 0 | Status: ON HOLD | OUTPATIENT
Start: 2023-03-27 | End: 2023-04-13

## 2023-03-27 NOTE — TELEPHONE ENCOUNTER
Writer notified patient via [employer] message that the new prescription for the remainder of Suboxne tablets from last appointment on 03/23/23 were sent to the pharmacy.     Alicia Vidal RN on 3/27/2023 at 4:58 PM

## 2023-03-27 NOTE — TELEPHONE ENCOUNTER
On 03/22/2023 a bridge of Suboxone 2-0.5 mg tablets qty 5 was sent into the pharmacy for the patient to get him through to 03/23/23 when he was to follow-up in the Recovery Clinic. Patient was seen in the Recovery Clinic on 03/23/23 and another prescription was sent to the pharmacy for 5 tablets. Patient is to Return in about 2 weeks (around 4/6/2023) for Follow up, in person. Therefore needing an additional prescription sent into the pharmacy for Suboxone.     Pended order and routing to provider to further assist.     Alicia Vidal RN on 3/27/2023 at 1:18 PM

## 2023-03-27 NOTE — TELEPHONE ENCOUNTER
Reason for call:  Other   Patient called regarding (reason for call): prescription  Additional comments: pt did not get all meds from pharmacy - they only gave him 5 . He needs 2 weeks worth     Phone number to reach patient:  Home number on file [phone] (home) or jesica /wife [phone]    Best Time:  Any     Can we leave a detailed message on this number?  YES    Travel screening: Negative

## 2023-04-10 ENCOUNTER — TELEPHONE (OUTPATIENT)
Dept: BEHAVIORAL HEALTH | Facility: CLINIC | Age: 53
End: 2023-04-10
Payer: COMMERCIAL

## 2023-04-10 ENCOUNTER — HOSPITAL ENCOUNTER (INPATIENT)
Facility: CLINIC | Age: 53
LOS: 7 days | Discharge: HOME | End: 2023-04-17
Attending: FAMILY MEDICINE | Admitting: PSYCHIATRY & NEUROLOGY
Payer: COMMERCIAL

## 2023-04-10 ENCOUNTER — TELEPHONE (OUTPATIENT)
Dept: BEHAVIORAL HEALTH | Facility: CLINIC | Age: 53
End: 2023-04-10

## 2023-04-10 DIAGNOSIS — F10.229 ALCOHOL DEPENDENCE WITH INTOXICATION WITH COMPLICATION (H): ICD-10-CM

## 2023-04-10 DIAGNOSIS — F11.20 OPIOID USE DISORDER, SEVERE, DEPENDENCE (H): ICD-10-CM

## 2023-04-10 DIAGNOSIS — F11.20 UNCOMPLICATED OPIOID DEPENDENCE (H): Primary | ICD-10-CM

## 2023-04-10 DIAGNOSIS — F10.20 ALCOHOL USE DISORDER, MODERATE, DEPENDENCE (H): ICD-10-CM

## 2023-04-10 DIAGNOSIS — Z11.52 ENCOUNTER FOR SCREENING LABORATORY TESTING FOR SEVERE ACUTE RESPIRATORY SYNDROME CORONAVIRUS 2 (SARS-COV-2): ICD-10-CM

## 2023-04-10 DIAGNOSIS — F11.20 OPIOID USE DISORDER, SEVERE, DEPENDENCE (H): Primary | ICD-10-CM

## 2023-04-10 DIAGNOSIS — F17.200 NICOTINE USE DISORDER: ICD-10-CM

## 2023-04-10 LAB
ALBUMIN SERPL BCG-MCNC: 4 G/DL (ref 3.5–5.2)
ALCOHOL BREATH TEST: 0.42 (ref 0–0.01)
ALP SERPL-CCNC: 141 U/L (ref 40–129)
ALT SERPL W P-5'-P-CCNC: 28 U/L (ref 10–50)
AMPHETAMINES UR QL SCN: NORMAL
ANION GAP SERPL CALCULATED.3IONS-SCNC: 11 MMOL/L (ref 7–15)
AST SERPL W P-5'-P-CCNC: 67 U/L (ref 10–50)
BARBITURATES UR QL SCN: NORMAL
BASOPHILS # BLD AUTO: 0.1 10E3/UL (ref 0–0.2)
BASOPHILS NFR BLD AUTO: 2 %
BENZODIAZ UR QL SCN: NORMAL
BILIRUB SERPL-MCNC: 0.4 MG/DL
BUN SERPL-MCNC: 5.4 MG/DL (ref 6–20)
BZE UR QL SCN: NORMAL
CALCIUM SERPL-MCNC: 9.1 MG/DL (ref 8.6–10)
CANNABINOIDS UR QL SCN: NORMAL
CHLORIDE SERPL-SCNC: 100 MMOL/L (ref 98–107)
CREAT SERPL-MCNC: 0.67 MG/DL (ref 0.67–1.17)
DEPRECATED HCO3 PLAS-SCNC: 25 MMOL/L (ref 22–29)
EOSINOPHIL # BLD AUTO: 0.1 10E3/UL (ref 0–0.7)
EOSINOPHIL NFR BLD AUTO: 1 %
ERYTHROCYTE [DISTWIDTH] IN BLOOD BY AUTOMATED COUNT: 14.8 % (ref 10–15)
ETHANOL SERPL-MCNC: 0.42 G/DL
GFR SERPL CREATININE-BSD FRML MDRD: >90 ML/MIN/1.73M2
GLUCOSE SERPL-MCNC: 153 MG/DL (ref 70–99)
HCT VFR BLD AUTO: 35.5 % (ref 40–53)
HGB BLD-MCNC: 11.7 G/DL (ref 13.3–17.7)
IMM GRANULOCYTES # BLD: 0 10E3/UL
IMM GRANULOCYTES NFR BLD: 0 %
LIPASE SERPL-CCNC: 42 U/L (ref 13–60)
LYMPHOCYTES # BLD AUTO: 1.5 10E3/UL (ref 0.8–5.3)
LYMPHOCYTES NFR BLD AUTO: 28 %
MCH RBC QN AUTO: 31.5 PG (ref 26.5–33)
MCHC RBC AUTO-ENTMCNC: 33 G/DL (ref 31.5–36.5)
MCV RBC AUTO: 96 FL (ref 78–100)
MONOCYTES # BLD AUTO: 0.6 10E3/UL (ref 0–1.3)
MONOCYTES NFR BLD AUTO: 11 %
NEUTROPHILS # BLD AUTO: 3.1 10E3/UL (ref 1.6–8.3)
NEUTROPHILS NFR BLD AUTO: 58 %
NRBC # BLD AUTO: 0 10E3/UL
NRBC BLD AUTO-RTO: 0 /100
OPIATES UR QL SCN: NORMAL
PLATELET # BLD AUTO: 146 10E3/UL (ref 150–450)
POTASSIUM SERPL-SCNC: 4.2 MMOL/L (ref 3.4–5.3)
PROT SERPL-MCNC: 7.8 G/DL (ref 6.4–8.3)
RBC # BLD AUTO: 3.71 10E6/UL (ref 4.4–5.9)
SARS-COV-2 RNA RESP QL NAA+PROBE: NEGATIVE
SODIUM SERPL-SCNC: 136 MMOL/L (ref 136–145)
WBC # BLD AUTO: 5.5 10E3/UL (ref 4–11)

## 2023-04-10 PROCEDURE — C9803 HOPD COVID-19 SPEC COLLECT: HCPCS | Performed by: FAMILY MEDICINE

## 2023-04-10 PROCEDURE — 99285 EMERGENCY DEPT VISIT HI MDM: CPT | Performed by: FAMILY MEDICINE

## 2023-04-10 PROCEDURE — 36415 COLL VENOUS BLD VENIPUNCTURE: CPT | Performed by: FAMILY MEDICINE

## 2023-04-10 PROCEDURE — 99207 PR SELF-HELP/PEER SVC PER 15 MIN: CPT

## 2023-04-10 PROCEDURE — 250N000013 HC RX MED GY IP 250 OP 250 PS 637: Performed by: PSYCHIATRY & NEUROLOGY

## 2023-04-10 PROCEDURE — 85025 COMPLETE CBC W/AUTO DIFF WBC: CPT | Performed by: FAMILY MEDICINE

## 2023-04-10 PROCEDURE — 82075 ASSAY OF BREATH ETHANOL: CPT | Performed by: FAMILY MEDICINE

## 2023-04-10 PROCEDURE — 128N000004 HC R&B CD ADULT

## 2023-04-10 PROCEDURE — 250N000013 HC RX MED GY IP 250 OP 250 PS 637: Performed by: EMERGENCY MEDICINE

## 2023-04-10 PROCEDURE — 82077 ASSAY SPEC XCP UR&BREATH IA: CPT | Performed by: FAMILY MEDICINE

## 2023-04-10 PROCEDURE — U0005 INFEC AGEN DETEC AMPLI PROBE: HCPCS | Performed by: FAMILY MEDICINE

## 2023-04-10 PROCEDURE — 80307 DRUG TEST PRSMV CHEM ANLYZR: CPT | Performed by: FAMILY MEDICINE

## 2023-04-10 PROCEDURE — 80053 COMPREHEN METABOLIC PANEL: CPT | Performed by: FAMILY MEDICINE

## 2023-04-10 PROCEDURE — 250N000013 HC RX MED GY IP 250 OP 250 PS 637: Performed by: FAMILY MEDICINE

## 2023-04-10 PROCEDURE — 83690 ASSAY OF LIPASE: CPT | Performed by: FAMILY MEDICINE

## 2023-04-10 RX ORDER — DIAZEPAM 5 MG
5-20 TABLET ORAL EVERY 30 MIN PRN
Status: DISCONTINUED | OUTPATIENT
Start: 2023-04-10 | End: 2023-04-17 | Stop reason: HOSPADM

## 2023-04-10 RX ORDER — MULTIPLE VITAMINS W/ MINERALS TAB 9MG-400MCG
1 TAB ORAL DAILY
Status: DISCONTINUED | OUTPATIENT
Start: 2023-04-11 | End: 2023-04-17 | Stop reason: HOSPADM

## 2023-04-10 RX ORDER — POLYETHYLENE GLYCOL 3350 17 G
2 POWDER IN PACKET (EA) ORAL
Status: DISCONTINUED | OUTPATIENT
Start: 2023-04-10 | End: 2023-04-17 | Stop reason: HOSPADM

## 2023-04-10 RX ORDER — ACETAMINOPHEN 325 MG/1
650 TABLET ORAL EVERY 4 HOURS PRN
Status: DISCONTINUED | OUTPATIENT
Start: 2023-04-10 | End: 2023-04-17 | Stop reason: HOSPADM

## 2023-04-10 RX ORDER — AMOXICILLIN 250 MG
1 CAPSULE ORAL 2 TIMES DAILY PRN
Status: DISCONTINUED | OUTPATIENT
Start: 2023-04-10 | End: 2023-04-17 | Stop reason: HOSPADM

## 2023-04-10 RX ORDER — HYDROXYZINE HYDROCHLORIDE 25 MG/1
25 TABLET, FILM COATED ORAL EVERY 4 HOURS PRN
Status: DISCONTINUED | OUTPATIENT
Start: 2023-04-10 | End: 2023-04-13

## 2023-04-10 RX ORDER — FOLIC ACID 1 MG/1
1 TABLET ORAL DAILY
Status: DISCONTINUED | OUTPATIENT
Start: 2023-04-11 | End: 2023-04-17 | Stop reason: HOSPADM

## 2023-04-10 RX ORDER — ATENOLOL 50 MG/1
50 TABLET ORAL DAILY PRN
Status: DISCONTINUED | OUTPATIENT
Start: 2023-04-10 | End: 2023-04-17 | Stop reason: HOSPADM

## 2023-04-10 RX ORDER — BUPRENORPHINE HYDROCHLORIDE AND NALOXONE HYDROCHLORIDE DIHYDRATE 2; .5 MG/1; MG/1
1 TABLET SUBLINGUAL 2 TIMES DAILY
Status: DISCONTINUED | OUTPATIENT
Start: 2023-04-10 | End: 2023-04-17 | Stop reason: HOSPADM

## 2023-04-10 RX ORDER — MAGNESIUM HYDROXIDE/ALUMINUM HYDROXICE/SIMETHICONE 120; 1200; 1200 MG/30ML; MG/30ML; MG/30ML
30 SUSPENSION ORAL EVERY 4 HOURS PRN
Status: DISCONTINUED | OUTPATIENT
Start: 2023-04-10 | End: 2023-04-17 | Stop reason: HOSPADM

## 2023-04-10 RX ORDER — TRAZODONE HYDROCHLORIDE 50 MG/1
50 TABLET, FILM COATED ORAL
Status: DISCONTINUED | OUTPATIENT
Start: 2023-04-10 | End: 2023-04-13

## 2023-04-10 RX ADMIN — NICOTINE POLACRILEX 2 MG: 2 LOZENGE ORAL at 21:59

## 2023-04-10 RX ADMIN — NICOTINE POLACRILEX 2 MG: 2 LOZENGE ORAL at 15:25

## 2023-04-10 RX ADMIN — NICOTINE POLACRILEX 2 MG: 2 GUM, CHEWING BUCCAL at 19:35

## 2023-04-10 RX ADMIN — BUPRENORPHINE AND NALOXONE 1 TABLET: 2; .5 TABLET SUBLINGUAL at 21:59

## 2023-04-10 ASSESSMENT — ACTIVITIES OF DAILY LIVING (ADL)
DOING_ERRANDS_INDEPENDENTLY_DIFFICULTY: NO
HYGIENE/GROOMING: INDEPENDENT
LAUNDRY: WITH SUPERVISION
ADLS_ACUITY_SCORE: 28
WALKING_OR_CLIMBING_STAIRS_DIFFICULTY: NO
ORAL_HYGIENE: INDEPENDENT
WEAR_GLASSES_OR_BLIND: NO
HEARING_DIFFICULTY_OR_DEAF: NO
ADLS_ACUITY_SCORE: 35
DIFFICULTY_COMMUNICATING: NO
TOILETING_ISSUES: NO
ADLS_ACUITY_SCORE: 35
DRESS: INDEPENDENT
ADLS_ACUITY_SCORE: 35
DRESSING/BATHING_DIFFICULTY: NO
ADLS_ACUITY_SCORE: 28
CONCENTRATING,_REMEMBERING_OR_MAKING_DECISIONS_DIFFICULTY: NO
CHANGE_IN_FUNCTIONAL_STATUS_SINCE_ONSET_OF_CURRENT_ILLNESS/INJURY: NO
DIFFICULTY_EATING/SWALLOWING: NO
FALL_HISTORY_WITHIN_LAST_SIX_MONTHS: NO

## 2023-04-10 NOTE — TELEPHONE ENCOUNTER
S: [facility] , Provider Christianson calling at 5:04pm with clinical on a 53 year old/Male presenting for alcohol detox.     B: Pt presents for ETOH detox.   Currently reports drinking 3 to 7 beers daily and maybe 4 to 5 shots of liquor daily for the past several months. He came in with a 0.42 at 3:14pm.  Pt is walking and talking.  He wants to come in for detox and reports this is his first detox.  He denies a hx of w/d seizures or DTS.  Not displaying any w/d symptoms.  But when he does, he gets very shaky.  Pt is independent with all ADLs.  He is followed by Dr. Arciniega and receives suboxone at the recovery clinic and wants to maintain his suboxone.  Denies any mh concerns.  No acute medical concerns.  Calm and cooperative.   Not a very good historian.    Patient reports last use was 3 days ago.  Pt BAC: 0.421 @ 3:14pm  Pt  denies hx of DT  Pt  denies hx of seizures. Last seizure: N/A  Pt endorsing the following symptoms of withdrawal: Shaky  MSSA Score: still intoxicated    Pt denies acute mental health or medical concerns.   Pt denies other drug use: None Amount/frequency: N/A    Does Pt have a detox care plan in [facility]? No  Does pt present with specific needs, assistive devices, or exclusionary criteria? None  Is the patient ambulating, eating and drinking in the ED? Yes    A: Pt meets criteria to be presented for IP detox admission. Patient is voluntary  COVID: Negative  Utox: In process  CMP: Abnormalities: [id number], [id number]  CBC: WNL  HCG: N/A     R: Patient cleared and ready for behavioral bed placement: Yes    Presenting for admission to 3A/CD    6PM- Dr. Melara, on call psychiatrist accepts for 3A/Kayla.  CD admit.

## 2023-04-10 NOTE — ED PROVIDER NOTES
ED Provider Note  [facility]      History     Chief Complaint   Patient presents with     Alcohol Problem     Patient here seeking detox from alcohol. Patient reports his last drink was three days ago. Patient denies a seizure history.      HPI  Justus Cope is a 53 year old male with a past medical history significant for polysubstance use (alcohol, opiates, benzodiazepines), depression and anxiety who presents to the Emergency Department seeking detox.  Patient is followed by Dr. Arciniega at the recovery clinic and receives Suboxone regularly he is however continuing to abuse alcohol on a more regular basis and with more intensity.  Patient states that he drinks several beers a day along with several shots and has been doing so for several months.  Patient continues to use his Suboxone as as prescribed.  Patient denies any acute psychiatric concerns no suicidal or homicidal ideation no thoughts of self injury patient denies any other acute medical concerns at this time.      Past Medical History  Past Medical History:   Diagnosis Date     Childhood asthma      Chronic neck pain      H/O anxiety disorder      H/O: depression      Opioid use disorder      Past Surgical History:   Procedure Laterality Date     NO HISTORY OF SURGERY       buprenorphine-naloxone (SUBOXONE) 2-0.5 MG SUBL sublingual tablet  cloNIDine (CATAPRES) 0.1 MG tablet  gabapentin (NEURONTIN) 300 MG capsule  acamprosate (CAMPRAL) 333 MG EC tablet  DTx Garry - Subst Use Disorder (RESET)  naloxone (NARCAN) 4 MG/0.1ML nasal spray      No Known Allergies  Family History  Family History   Problem Relation Age of Onset     Substance Abuse No family hx of      Social History   Social History     Tobacco Use     Smoking status: Every Day     Packs/day: 1.00     Types: Cigarettes     Start date: 1985     Smokeless tobacco: Never   Vaping Use     Vaping status: Never Used   Substance Use Topics     Alcohol use: Yes     Comment:  last drank two days ago      Drug use: Not Currently         A medically appropriate review of systems was performed with pertinent positives and negatives noted in the HPI, and all other systems negative.    Physical Exam   BP: 101/70  Pulse: 105  Temp: 98  F (36.7  C)  Resp: 16  SpO2: 96 %  Physical Exam  Constitutional:       General: He is not in acute distress.     Appearance: Normal appearance. He is not toxic-appearing.   HENT:      Head: Atraumatic.      Nose: No congestion.   Eyes:      General: No scleral icterus.     Conjunctiva/sclera: Conjunctivae normal.   Cardiovascular:      Rate and Rhythm: Normal rate.   Pulmonary:      Effort: Pulmonary effort is normal. No respiratory distress.   Abdominal:      General: Abdomen is flat.      Palpations: Abdomen is soft.   Musculoskeletal:         General: No swelling or tenderness.      Cervical back: Neck supple. No tenderness.   Lymphadenopathy:      Cervical: No cervical adenopathy.   Skin:     General: Skin is warm.      Capillary Refill: Capillary refill takes less than 2 seconds.      Findings: No rash.   Neurological:      Mental Status: He is alert and oriented to person, place, and time.      Sensory: No sensory deficit.      Motor: No weakness.      Coordination: Coordination normal.   Psychiatric:         Mood and Affect: Mood is anxious.         Speech: Speech is slurred.         Thought Content: Thought content does not include homicidal or suicidal ideation.           ED Course, Procedures, & Data      Procedures          Results for orders placed or performed during the hospital encounter of 04/10/23   Comprehensive metabolic panel     Status: Abnormal   Result Value Ref Range    Sodium 136 136 - 145 mmol/L    Potassium 4.2 3.4 - 5.3 mmol/L    Chloride 100 98 - 107 mmol/L    Carbon Dioxide (CO2) 25 22 - 29 mmol/L    Anion Gap 11 7 - 15 mmol/L    Urea Nitrogen 5.4 (L) 6.0 - 20.0 mg/dL    Creatinine 0.67 0.67 - 1.17 mg/dL    Calcium 9.1 8.6 - 10.0  mg/dL    Glucose 153 (H) 70 - 99 mg/dL    Alkaline Phosphatase 141 (H) 40 - 129 U/L    AST 67 (H) 10 - 50 U/L    ALT 28 10 - 50 U/L    Protein Total 7.8 6.4 - 8.3 g/dL    Albumin 4.0 3.5 - 5.2 g/dL    Bilirubin Total 0.4 <=1.2 mg/dL    GFR Estimate >90 >60 mL/min/1.73m2   Lipase     Status: Normal   Result Value Ref Range    Lipase 42 13 - 60 U/L   Ethyl Alcohol Level     Status: Abnormal   Result Value Ref Range    Alcohol ethyl 0.42 (HH) <=0.01 g/dL   Asymptomatic COVID-19 Virus (Coronavirus) by PCR Nasopharyngeal     Status: Normal    Specimen: Nasopharyngeal; Swab   Result Value Ref Range    SARS CoV2 PCR Negative Negative    Narrative    Testing was performed using the Xpert Xpress SARS-CoV-2 Assay on the Cepheid Gene-Xpert Instrument Systems. Additional information about this Emergency Use Authorization (EUA) assay can be found via the Lab Guide. This test should be ordered for the detection of SARS-CoV-2 in individuals who meet SARS-CoV-2 clinical and/or epidemiological criteria as well as from individuals without symptoms or other reasons to suspect COVID-19. Test performance for asymptomatic patients has only been established in anterior nasal swab specimens. This test is for in vitro diagnostic use under the FDA EUA for laboratories certified under CLIA to perform high complexity testing. This test has not been FDA cleared or approved. A negative result does not rule out the presence of PCR inhibitors in the specimen or target RNA concentration below the limit of detection for the assay. The possibility of a false negative should be considered if the patient's recent exposure or clinical presentation suggests COVID-19. This test was validated by the [facility]. This laboratory is certified under the Clinical Laboratory Improvement Amendments (CLIA) as qualified to perform high complexity laboratory testing.     CBC with platelets and differential     Status: Abnormal    Result Value Ref Range    WBC Count 5.5 4.0 - 11.0 10e3/uL    RBC Count 3.71 (L) 4.40 - 5.90 10e6/uL    Hemoglobin 11.7 (L) 13.3 - 17.7 g/dL    Hematocrit 35.5 (L) 40.0 - 53.0 %    MCV 96 78 - 100 fL    MCH 31.5 26.5 - 33.0 pg    MCHC 33.0 31.5 - 36.5 g/dL    RDW 14.8 10.0 - 15.0 %    Platelet Count 146 (L) 150 - 450 10e3/uL    % Neutrophils 58 %    % Lymphocytes 28 %    % Monocytes 11 %    % Eosinophils 1 %    % Basophils 2 %    % Immature Granulocytes 0 %    NRBCs per 100 WBC 0 <1 /100    Absolute Neutrophils 3.1 1.6 - 8.3 10e3/uL    Absolute Lymphocytes 1.5 0.8 - 5.3 10e3/uL    Absolute Monocytes 0.6 0.0 - 1.3 10e3/uL    Absolute Eosinophils 0.1 0.0 - 0.7 10e3/uL    Absolute Basophils 0.1 0.0 - 0.2 10e3/uL    Absolute Immature Granulocytes 0.0 <=0.4 10e3/uL    Absolute NRBCs 0.0 10e3/uL   Alcohol breath test POCT     Status: Abnormal   Result Value Ref Range    Alcohol Breath Test 0.421 (A) 0.00 - 0.01   CBC with platelets differential     Status: Abnormal    Narrative    The following orders were created for panel order CBC with platelets differential.  Procedure                               Abnormality         Status                     ---------                               -----------         ------                     CBC with platelets and d...[[id number]]  Abnormal            Final result                 Please view results for these tests on the individual orders.            Medications   nicotine (COMMIT) lozenge 2 mg (2 mg Buccal $Given 4/10/23 1142)     Labs Ordered and Resulted from Time of ED Arrival to Time of ED Departure   COMPREHENSIVE METABOLIC PANEL - Abnormal       Result Value    Sodium 136      Potassium 4.2      Chloride 100      Carbon Dioxide (CO2) 25      Anion Gap 11      Urea Nitrogen 5.4 (*)     Creatinine 0.67      Calcium 9.1      Glucose 153 (*)     Alkaline Phosphatase 141 (*)     AST 67 (*)     ALT 28      Protein Total 7.8      Albumin 4.0      Bilirubin Total 0.4      GFR  Estimate >90     ETHYL ALCOHOL LEVEL - Abnormal    Alcohol ethyl 0.42 (*)    CBC WITH PLATELETS AND DIFFERENTIAL - Abnormal    WBC Count 5.5      RBC Count 3.71 (*)     Hemoglobin 11.7 (*)     Hematocrit 35.5 (*)     MCV 96      MCH 31.5      MCHC 33.0      RDW 14.8      Platelet Count 146 (*)     % Neutrophils 58      % Lymphocytes 28      % Monocytes 11      % Eosinophils 1      % Basophils 2      % Immature Granulocytes 0      NRBCs per 100 WBC 0      Absolute Neutrophils 3.1      Absolute Lymphocytes 1.5      Absolute Monocytes 0.6      Absolute Eosinophils 0.1      Absolute Basophils 0.1      Absolute Immature Granulocytes 0.0      Absolute NRBCs 0.0     ALCOHOL BREATH TEST POCT - Abnormal    Alcohol Breath Test 0.421 (*)    LIPASE - Normal    Lipase 42     COVID-19 VIRUS (CORONAVIRUS) BY PCR - Normal    SARS CoV2 PCR Negative       No orders to display          Critical care was not performed.     Medical Decision Making  The patient's presentation was of high complexity (a chronic illness severe exacerbation, progression, or side effect of treatment).    The patient's evaluation involved:  ordering and/or review of 3+ test(s) in this encounter (see separate area of note for details)    The patient's management necessitated decision for hospitalization patient will require inpatient detox at this time..      Assessment & Plan        I have reviewed the nursing notes. I have reviewed the findings, diagnosis, plan and need for follow up with the patient.    Patient with chronic alcohol dependence at this time is at risk for significant withdrawal patient will require a medically supervised detox and will be admitted to station 3A    Final diagnoses:   Alcohol dependence with intoxication with complication (H)       Hi Taylor  [facility] EMERGENCY DEPARTMENT  4/10/2023     Hi Taylor MD  04/10/23 [id number]

## 2023-04-10 NOTE — ED TRIAGE NOTES
Triage Assessment     Row Name 04/10/23 1428       Triage Assessment (Adult)    Airway WDL WDL       Skin Circulation/Temperature WDL    Skin Circulation/Temperature WDL WDL       Cardiac WDL    Cardiac WDL WDL       Peripheral/Neurovascular WDL    Peripheral Neurovascular WDL WDL       Cognitive/Neuro/Behavioral WDL    Cognitive/Neuro/Behavioral WDL WDL

## 2023-04-10 NOTE — ED TRIAGE NOTES
Patient here to go to detox from alcohol. Patient reports he has never been to detox before. Patient reports last drink was 3 days ago.  Patient drinks 3-7 beers a day and 4-5 shots of hard liquor a day. Patient has a history of opioid addiction managed with buprenorphine through Dr. Roe.   Patient denies seizure history.

## 2023-04-10 NOTE — TELEPHONE ENCOUNTER
SCARLE[facility] placed a telephone recovery support call to pt given a recent no show for scheduled appointment with provider in Recovery [facility].    [facility] left a voicemail message providing Recovery Clinic number [phone] to schedule a new appointment, as well as Recovery Clinic Walk-In hours: Monday - Friday from 9 am to 3 pm.     Shashank Vásquez  Peer   Rec[facility] Clinic   Direct Dial: [phone]    11:15 am

## 2023-04-10 NOTE — MEDICATION SCRIBE - ADMISSION MEDICATION HISTORY
Admission Medication History    Admission medication history is complete. The information provided in this note is only as accurate as the sources available at the time of the update.    Medication reconciliation/reorder completed by provider prior to medication history? yes    Information Source(s): Fill hx and verbal interview with pt in [facility]    Pertinent Information: Pt states he just ran out of his gabapentin and clonidine supplies from several months ago as he only takes them occasionally.    Changes made to PTA medication list:    Added: none    Deleted: Campral,     Changed: none}    Medication Affordability: no       Allergies reviewed with patient and updates made in EHR: yes    Medication History Completed By: Brianna Navas 4/10/2023 5:35 PM    PTA Med List   Medication Sig Note Last Dose     buprenorphine-naloxone (SUBOXONE) 2-0.5 MG SUBL sublingual tablet Place 1 tablet under the tongue 2 times daily  4/9/2023 at pm     cloNIDine (CATAPRES) 0.1 MG tablet Take 1 tablet (0.1 mg) by mouth 2 times daily as needed (anxiety, high bp) 4/10/2023: Takes occasionally 4/9/2023     gabapentin (NEURONTIN) 300 MG capsule Take 1 capsule (300 mg) by mouth 2 times daily as needed for other (alcohol cravings or withdrawal symptoms) 4/10/2023: Takes occasionally 4/9/2023

## 2023-04-11 LAB
ALBUMIN SERPL BCG-MCNC: 3.6 G/DL (ref 3.5–5.2)
ALP SERPL-CCNC: 134 U/L (ref 40–129)
ALT SERPL W P-5'-P-CCNC: 26 U/L (ref 10–50)
ANION GAP SERPL CALCULATED.3IONS-SCNC: 12 MMOL/L (ref 7–15)
AST SERPL W P-5'-P-CCNC: 58 U/L (ref 10–50)
BASOPHILS # BLD AUTO: 0.1 10E3/UL (ref 0–0.2)
BASOPHILS NFR BLD AUTO: 1 %
BILIRUB SERPL-MCNC: 0.5 MG/DL
BUN SERPL-MCNC: 8.8 MG/DL (ref 6–20)
CALCIUM SERPL-MCNC: 8.9 MG/DL (ref 8.6–10)
CHLORIDE SERPL-SCNC: 100 MMOL/L (ref 98–107)
CREAT SERPL-MCNC: 0.68 MG/DL (ref 0.67–1.17)
DEPRECATED HCO3 PLAS-SCNC: 25 MMOL/L (ref 22–29)
EOSINOPHIL # BLD AUTO: 0.1 10E3/UL (ref 0–0.7)
EOSINOPHIL NFR BLD AUTO: 1 %
ERYTHROCYTE [DISTWIDTH] IN BLOOD BY AUTOMATED COUNT: 14.8 % (ref 10–15)
GFR SERPL CREATININE-BSD FRML MDRD: >90 ML/MIN/1.73M2
GGT SERPL-CCNC: 110 U/L (ref 8–61)
GLUCOSE SERPL-MCNC: 68 MG/DL (ref 70–99)
HCT VFR BLD AUTO: 34.6 % (ref 40–53)
HGB BLD-MCNC: 11.5 G/DL (ref 13.3–17.7)
IMM GRANULOCYTES # BLD: 0 10E3/UL
IMM GRANULOCYTES NFR BLD: 0 %
LYMPHOCYTES # BLD AUTO: 1.3 10E3/UL (ref 0.8–5.3)
LYMPHOCYTES NFR BLD AUTO: 16 %
MCH RBC QN AUTO: 32 PG (ref 26.5–33)
MCHC RBC AUTO-ENTMCNC: 33.2 G/DL (ref 31.5–36.5)
MCV RBC AUTO: 96 FL (ref 78–100)
MONOCYTES # BLD AUTO: 0.6 10E3/UL (ref 0–1.3)
MONOCYTES NFR BLD AUTO: 8 %
NEUTROPHILS # BLD AUTO: 6.1 10E3/UL (ref 1.6–8.3)
NEUTROPHILS NFR BLD AUTO: 74 %
NRBC # BLD AUTO: 0 10E3/UL
NRBC BLD AUTO-RTO: 0 /100
PLATELET # BLD AUTO: 128 10E3/UL (ref 150–450)
POTASSIUM SERPL-SCNC: 4.1 MMOL/L (ref 3.4–5.3)
PROT SERPL-MCNC: 7.2 G/DL (ref 6.4–8.3)
RBC # BLD AUTO: 3.59 10E6/UL (ref 4.4–5.9)
SODIUM SERPL-SCNC: 137 MMOL/L (ref 136–145)
WBC # BLD AUTO: 8.1 10E3/UL (ref 4–11)

## 2023-04-11 PROCEDURE — 93005 ELECTROCARDIOGRAM TRACING: CPT

## 2023-04-11 PROCEDURE — 99223 1ST HOSP IP/OBS HIGH 75: CPT | Mod: AI | Performed by: PSYCHIATRY & NEUROLOGY

## 2023-04-11 PROCEDURE — 99221 1ST HOSP IP/OBS SF/LOW 40: CPT | Performed by: PHYSICIAN ASSISTANT

## 2023-04-11 PROCEDURE — 250N000013 HC RX MED GY IP 250 OP 250 PS 637: Performed by: FAMILY MEDICINE

## 2023-04-11 PROCEDURE — 85025 COMPLETE CBC W/AUTO DIFF WBC: CPT | Performed by: PSYCHIATRY & NEUROLOGY

## 2023-04-11 PROCEDURE — 36415 COLL VENOUS BLD VENIPUNCTURE: CPT | Performed by: PSYCHIATRY & NEUROLOGY

## 2023-04-11 PROCEDURE — 80053 COMPREHEN METABOLIC PANEL: CPT | Performed by: PSYCHIATRY & NEUROLOGY

## 2023-04-11 PROCEDURE — HZ2ZZZZ DETOXIFICATION SERVICES FOR SUBSTANCE ABUSE TREATMENT: ICD-10-PCS | Performed by: PSYCHIATRY & NEUROLOGY

## 2023-04-11 PROCEDURE — 250N000013 HC RX MED GY IP 250 OP 250 PS 637: Performed by: PSYCHIATRY & NEUROLOGY

## 2023-04-11 PROCEDURE — 93010 ELECTROCARDIOGRAM REPORT: CPT | Performed by: INTERNAL MEDICINE

## 2023-04-11 PROCEDURE — 128N000004 HC R&B CD ADULT

## 2023-04-11 PROCEDURE — 82977 ASSAY OF GGT: CPT | Performed by: PSYCHIATRY & NEUROLOGY

## 2023-04-11 RX ORDER — NICOTINE 21 MG/24HR
1 PATCH, TRANSDERMAL 24 HOURS TRANSDERMAL DAILY
Status: DISCONTINUED | OUTPATIENT
Start: 2023-04-11 | End: 2023-04-17 | Stop reason: HOSPADM

## 2023-04-11 RX ADMIN — DIAZEPAM 10 MG: 5 TABLET ORAL at 08:35

## 2023-04-11 RX ADMIN — DIAZEPAM 10 MG: 5 TABLET ORAL at 13:25

## 2023-04-11 RX ADMIN — NICOTINE POLACRILEX 2 MG: 2 LOZENGE ORAL at 10:52

## 2023-04-11 RX ADMIN — DIAZEPAM 10 MG: 5 TABLET ORAL at 04:30

## 2023-04-11 RX ADMIN — NICOTINE 1 PATCH: 21 PATCH, EXTENDED RELEASE TRANSDERMAL at 10:52

## 2023-04-11 RX ADMIN — MULTIPLE VITAMINS W/ MINERALS TAB 1 TABLET: TAB at 08:34

## 2023-04-11 RX ADMIN — FOLIC ACID 1 MG: 1 TABLET ORAL at 08:34

## 2023-04-11 RX ADMIN — THIAMINE HCL TAB 100 MG 100 MG: 100 TAB at 08:34

## 2023-04-11 RX ADMIN — DIAZEPAM 10 MG: 5 TABLET ORAL at 16:16

## 2023-04-11 RX ADMIN — NICOTINE POLACRILEX 2 MG: 2 LOZENGE ORAL at 16:16

## 2023-04-11 RX ADMIN — DIAZEPAM 5 MG: 5 TABLET ORAL at 00:46

## 2023-04-11 RX ADMIN — BUPRENORPHINE AND NALOXONE 1 TABLET: 2; .5 TABLET SUBLINGUAL at 20:28

## 2023-04-11 RX ADMIN — BUPRENORPHINE AND NALOXONE 1 TABLET: 2; .5 TABLET SUBLINGUAL at 08:34

## 2023-04-11 ASSESSMENT — ACTIVITIES OF DAILY LIVING (ADL)
ADLS_ACUITY_SCORE: 28
HYGIENE/GROOMING: INDEPENDENT
ADLS_ACUITY_SCORE: 28
DRESS: INDEPENDENT
ADLS_ACUITY_SCORE: 28
ORAL_HYGIENE: INDEPENDENT
ADLS_ACUITY_SCORE: 28
LAUNDRY: WITH SUPERVISION
ADLS_ACUITY_SCORE: 28
HYGIENE/GROOMING: INDEPENDENT
ORAL_HYGIENE: INDEPENDENT
ADLS_ACUITY_SCORE: 28
ADLS_ACUITY_SCORE: 28
DRESS: STREET CLOTHES
ADLS_ACUITY_SCORE: 28
ADLS_ACUITY_SCORE: 28
LAUNDRY: WITH SUPERVISION

## 2023-04-11 NOTE — CONSULTS
[facility] [place]  Consult Note - Hospitalist Service  Date of Admission:  4/10/2023  Consult Requested by: Psychiatry  Reason for Consult: new patient    Chief Complaint   New patient    History of Present Illness   Justus Cope is a 53 year old male who has a past medical history of polysubstance abuse, depression and anxiety who presented voluntarily to the ER and want is admitted to inpatient detox on 4/11/2023 for help with alcohol abuse.  He is chronically on Suboxone with a history of opioid abuse and uses his Suboxone as prescribed.  Internal medicine was consulted as he is a new patient.    Vital signs upon meeting the patient include temp 98.6 Fahrenheit, heart rate 93 and regular, /86 and he is 96% on room air.  He currently denies any chest pain, shortness of breath, fevers, chills, vision changes, headaches, new rashes or sores.  He feels safe and denies any recent abuse.  He denies any recent medical concerns for which she would have gone to her doctor and he denies taking any routine daily medicines outside of Suboxone.  Baseline lab work including a BMP and CBC was only revealing for a mild anemia with a hemoglobin of 11.5 g/dL and a platelet count of 128,000 which is baseline for him.  His GGT was elevated at 110 with a glucose of 68.      Assessment & Plan   #Alcohol abuse and withdrawal  #History of polysubstance abuse  #Depression and anxiety  -Per primary psychiatry team  -Continue with current [facility] protocol with Valium  -Continue home regimen of Suboxone  -Continue Campral after detox is completed  -Continue with gabapentin and clonidine as needed    *Mr. Cope has no active internal medicine issues at this time.  Medicine will sign off.  Please contact us back if you have any further questions or concerns.*    Clinically Significant Risk Factors Present on Admission                # Thrombocytopenia: Lowest platelets = 128 in last 2 days, will  "monitor for bleeding   # Hypertension: home medication list includes antihypertensive(s)      # Cachexia: Estimated body mass index is 16.73 kg/m  as calculated from the following:    Height as of this encounter: 1.727 m (5' 8\").    Weight as of this encounter: 49.9 kg (110 lb).           HAL Whaley  Hospitalist Service  Securely message with [facility] (more info)  Text page via [facility] Paging/Directory   ______________________________________________________________________    Past Medical History    Past Medical History:   Diagnosis Date     Childhood asthma      Chronic neck pain      H/O anxiety disorder      H/O: depression      Opioid use disorder        Past Surgical History   Past Surgical History:   Procedure Laterality Date     NO HISTORY OF SURGERY         Medications   Current Facility-Administered Medications   Medication     acetaminophen (TYLENOL) tablet 650 mg     alum & mag hydroxide-simethicone (MAALOX) suspension 30 mL     atenolol (TENORMIN) tablet 50 mg     buprenorphine-naloxone (SUBOXONE) 2-0.5 MG sublingual tablet 1 tablet     diazepam (VALIUM) tablet 5-20 mg     folic acid (FOLVITE) tablet 1 mg     hydrOXYzine (ATARAX) tablet 25 mg     multivitamin w/minerals (THERA-VIT-M) tablet 1 tablet     naloxone (NARCAN) nasal spray 4 mg     nicotine (COMMIT) lozenge 2 mg     nicotine (NICODERM CQ) 21 MG/24HR 24 hr patch 1 patch     nicotine Patch in Place     senna-docusate (SENOKOT-S/PERICOLACE) 8.6-50 MG per tablet 1 tablet     thiamine (B-1) tablet 100 mg     traZODone (DESYREL) tablet 50 mg      Review of Systems    The 10 point Review of Systems is negative other than noted in the HPI or here.      Physical Exam   Vital Signs: Temp: 98.8  F (37.1  C) Temp src: Oral BP: (!) 159/97 Pulse: 88   Resp: 16 SpO2: 96 % O2 Device: None (Room air)    Weight: 110 lbs 0 oz    General Appearance: 53 year old gentleman sitting up in bed, slightly shaky,   Respiratory: breathing comfortably on room air, " no adventitious sounds to bilateral auscultation  Cardiovascular: tachycardic, regular rhythm, no appreciable murmurs, rubs or gallops  GI: bowel sounds active, soft, non-tender to palpation throughout, no rebound or guarding      Data   Recent Labs   Lab 04/11/23  0655 04/10/23  1440   WBC 8.1 5.5   HGB 11.5* 11.7*   MCV 96 96   * 146*    136   POTASSIUM 4.1 4.2   CHLORIDE 100 100   CO2 25 25   BUN 8.8 5.4*   CR 0.68 0.67   ANIONGAP 12 11   YAMILETH 8.9 9.1   GLC 68* 153*   ALBUMIN 3.6 4.0   PROTTOTAL 7.2 7.8   BILITOTAL 0.5 0.4   ALKPHOS 134* 141*   ALT 26 28   AST 58* 67*   LIPASE  --  42

## 2023-04-11 NOTE — PLAN OF CARE
"YASMIN      Justus Cope is a 53 year old male    S = Situation:     Pt voluntarily admitted from [place] ED seeking detox from alcohol. Pt is on suboxone maintenance.     B  = Background:     Pt reports this is his first admission for detox.   Pt reports drinking \"3-4 beers every 3 days\" or \"12 pack of beer in 1 week\" for the last 2 years. Pt denies history of withdrawal seizures. Pt denies history of delirium tremens.   Pt reports smoking cigarettes. Pt reports history of opiate use - Pt reports he used fentanyl, oxycodone, and morphine for 15 years and reports last use was 12 years ago.  Pt reports he has been on suboxone maintenance for 3.5-4 years.   ALT 28, AST 67  COVID negative  UTOX negative, alcohol breath test 0.421    A  =  Assessment:     Pt scored MSSA 4. Pt scored COWS 3, 2 - Pt received scheduled suboxone dose this shift.   Pt reports anxiety 8/10. Pt denies depression. Pt denies SI and SIB. Pt denies pain. Pt alert. Pt independent with steady gait.   Pt presents with flat affect, anxious. Pt visible in the milieu - engaged with peers, watching TV.  Pt reports good appetite. Pt denies nausea.  Pt compliant with scheduled medications. Pt received PRN nicotine lozenge 2 mg 1 time this shift.   VSS throughout the shift.  Precautions: seizure, fall    PTA medications reviewed with Pt. Pt reports last use of gabapentin and clonidine was \"2 weeks ago\".  Pt denies medical concerns. Pt denies asthma, diabetes, and chronic heart failure to this writer.    R =   Request or Recommendation:     Pt on MSSA protocol with valium. Pt on suboxone maintenance.   Pt to meet with internal medicine, psychiatry, and case management.  "

## 2023-04-11 NOTE — PLAN OF CARE
"Pt scored MSSA 8, 6. Pt received valium 10 mg 1 time this shift for symptoms of withdrawal. Pt scored COWS 6, 3. Pt received scheduled suboxone dose this shift.   Pt denied anxiety and depression. Pt denied pain. Pt denied SI and SIB. Pt alert. Pt independent with steady gait.   Pt reports he has a supportive family. Pt reports he is interested in seeking treatment upon discharge and would like to meet with case management tomorrow.   Pt presents as pleasant, calm, cooperative. Pt intermittently visible in the milieu and resting in own room.   Anel Mae, Pt's partner per Pt report, called to update writer that his mother has his clothing - Pt signed ROBBIE and filed in Pt's chart.  Pt reports good appetite. Pt denies nausea.   Pt compliant with scheduled medications. Pt received PRN nicotine lozenge 2 mg 1 time this shift.   Vitals: At 1606, /99 and HR 99. Latest VS: Blood pressure (!) 152/93, pulse 93, temperature 98.2  F (36.8  C), temperature source Oral, resp. rate 16, height 1.727 m (5' 8\"), weight 49.9 kg (110 lb), SpO2 98 %.  Precautions: seizure, fall    Problem: Adult Behavioral Health Plan of Care  Goal: Plan of Care Review  Outcome: Progressing  Flowsheets (Taken 4/11/2023 1610)  Patient Agreement with Plan of Care: agrees     Problem: Plan of Care - These are the overarching goals to be used throughout the patient stay.    Goal: Optimal Comfort and Wellbeing  Outcome: Progressing     Problem: Adult Behavioral Health Plan of Care  Goal: Develops/Participates in Therapeutic [place] to Support Successful Transition  Outcome: Progressing     Problem: Alcohol Withdrawal  Goal: Alcohol Withdrawal Symptom Control  Outcome: Progressing     Problem: Suicidal Behavior  Goal: Suicidal Behavior is Absent or Managed  Outcome: Progressing     "

## 2023-04-11 NOTE — PROGRESS NOTES
04/10/23 2012   Patient Belongings   Did you bring any home meds/supplements to the hospital?  No   Patient Belongings other (see comments)  (storage bin, med room bin)   Belongings Search Yes   Clothing Search Yes   Second Staff Vishal Murcia     Storage bin: cap, jacket, boots, 2 necklaces, pants, 2 lighters, keys  Med room bin: cell phone  Contraband knife to security  A               Admission:  I am responsible for any personal items that are not sent to the safe or pharmacy.  [facility] is not responsible for loss, theft or damage of any property in my possession.    Signature:  _________________________________ Date: _______  Time: _____                                              Staff Signature:  ____________________________ Date: ________  Time: _____      2nd Staff person, if patient is unable/unwilling to sign:    Signature: ________________________________ Date: ________  Time: _____     Discharge:  [facility] has returned all of my personal belongings:    Signature: _________________________________ Date: ________  Time: _____                                          Staff Signature:  ____________________________ Date: ________  Time: _____

## 2023-04-11 NOTE — PLAN OF CARE
Goal Outcome Evaluation:  Plan of Care Reviewed With: patient  Overall Patient Progress: no change    Patient continues in alcohol detox.  He slept 7 hours.  No acute events to report overnight.  Will continue to monitor.    0000 Assessment  Vitals: /86  Pulse 93  Temp 98  F (36.7  C) (Oral)  Resp 16  SpO2 96%  MSSA: 8    0400 Assessment  Vitals: BP (!) 144/90  Pulse 93  Temp 98.6  F (37  C) (Oral)  Resp 16  SpO2 97%  MSSA: 10    PRNs administered: valium 5 mg, valium 10 mg

## 2023-04-11 NOTE — H&P
"[facility], [place]   Psychiatric History and Physical  Admission date: 4/10/2023        Chief Complaint:   \"Alcohol\"         HPI:     The patient is a 54yo male with a history of alcohol use disorder, anxiety and opiate dependence on Suboxone who was admitted to detoxify from alcohol. Reports that he is \"all right.\" Does feel \"shaky\". Denies nausea and was able to eat breakfast. Slept well last night. Mood is anxious but denies SI. Does feel that Campral was helpful. Hasn't been using gabapentin or clonidine recently. Is open to CD treatment.      Per ER:  Justus Cope is a 53 year old male with a past medical history significant for polysubstance use (alcohol, opiates, benzodiazepines), depression and anxiety who presents to the Emergency Department seeking detox.  Patient is followed by Dr. Arciniega at the recovery clinic and receives Suboxone regularly he is however continuing to abuse alcohol on a more regular basis and with more intensity.  Patient states that he drinks several beers a day along with several shots and has been doing so for several months.  Patient continues to use his Suboxone as as prescribed.  Patient denies any acute psychiatric concerns no suicidal or homicidal ideation no thoughts of self injury patient denies any other acute medical concerns at this time.        Past Psychiatric History:     ROOPA. On PRN Clonidine and gabapentin most recently. Denies any history of suicide attempts.         Substance Use and History:     Alcohol use disorder. Denies withdrawal seizures or DTs.   Opiate use disorder on Suboxone maintenance.   Nicotine use disorder.         Past Medical History:   PAST MEDICAL HISTORY:   Past Medical History:   Diagnosis Date     Childhood asthma      Chronic neck pain      H/O anxiety disorder      H/O: depression      Opioid use disorder        PAST SURGICAL HISTORY:   Past Surgical History:   Procedure Laterality Date     NO HISTORY OF " "SURGERY               Family History:   FAMILY HISTORY:   Family History   Problem Relation Age of Onset     Substance Abuse No family hx of            Social History:   Please see the full psychosocial profile from the clinical treatment coordinator.   SOCIAL HISTORY:   Social History     Tobacco Use     Smoking status: Every Day     Packs/day: 1.00     Types: Cigarettes     Start date: 1985     Smokeless tobacco: Never   Vaping Use     Vaping status: Never Used   Substance Use Topics     Alcohol use: Yes     Comment: last drank two days ago             Physical ROS:   The patient endorsed \"shakiness\". The remainder of 10-point review of systems was negative except as noted in HPI.         PTA Medications:     Medications Prior to Admission   Medication Sig Dispense Refill Last Dose     buprenorphine-naloxone (SUBOXONE) 2-0.5 MG SUBL sublingual tablet Place 1 tablet under the tongue 2 times daily 22 tablet 0 4/9/2023 at pm     cloNIDine (CATAPRES) 0.1 MG tablet Take 1 tablet (0.1 mg) by mouth 2 times daily as needed (anxiety, high bp) 90 tablet 0 Past Month     gabapentin (NEURONTIN) 300 MG capsule Take 1 capsule (300 mg) by mouth 2 times daily as needed for other (alcohol cravings or withdrawal symptoms) 60 capsule 0 Past Month     acamprosate (CAMPRAL) 333 MG EC tablet Take 2 tablets (666 mg) by mouth 3 times daily (Patient not taking: Reported on 3/7/2023) 180 tablet 0      DTx Garry - Subst Use Disorder (RESET) Use as directed for 90 days. 1 each 3      naloxone (NARCAN) 4 MG/0.1ML nasal spray Spray 4 mg into one nostril alternating nostrils as needed for opioid reversal every 2-3 minutes until assistance arrives (Patient not taking: Reported on 3/23/2023)             Allergies:   No Known Allergies       Labs:     Recent Results (from the past 48 hour(s))   Comprehensive metabolic panel    Collection Time: 04/10/23  2:40 PM   Result Value Ref Range    Sodium 136 136 - 145 mmol/L    Potassium 4.2 3.4 - 5.3 mmol/L "    Chloride 100 98 - 107 mmol/L    Carbon Dioxide (CO2) 25 22 - 29 mmol/L    Anion Gap 11 7 - 15 mmol/L    Urea Nitrogen 5.4 (L) 6.0 - 20.0 mg/dL    Creatinine 0.67 0.67 - 1.17 mg/dL    Calcium 9.1 8.6 - 10.0 mg/dL    Glucose 153 (H) 70 - 99 mg/dL    Alkaline Phosphatase 141 (H) 40 - 129 U/L    AST 67 (H) 10 - 50 U/L    ALT 28 10 - 50 U/L    Protein Total 7.8 6.4 - 8.3 g/dL    Albumin 4.0 3.5 - 5.2 g/dL    Bilirubin Total 0.4 <=1.2 mg/dL    GFR Estimate >90 >60 mL/min/1.73m2   Lipase    Collection Time: 04/10/23  2:40 PM   Result Value Ref Range    Lipase 42 13 - 60 U/L   Ethyl Alcohol Level    Collection Time: 04/10/23  2:40 PM   Result Value Ref Range    Alcohol ethyl 0.42 (HH) <=0.01 g/dL   CBC with platelets and differential    Collection Time: 04/10/23  2:40 PM   Result Value Ref Range    WBC Count 5.5 4.0 - 11.0 10e3/uL    RBC Count 3.71 (L) 4.40 - 5.90 10e6/uL    Hemoglobin 11.7 (L) 13.3 - 17.7 g/dL    Hematocrit 35.5 (L) 40.0 - 53.0 %    MCV 96 78 - 100 fL    MCH 31.5 26.5 - 33.0 pg    MCHC 33.0 31.5 - 36.5 g/dL    RDW 14.8 10.0 - 15.0 %    Platelet Count 146 (L) 150 - 450 10e3/uL    % Neutrophils 58 %    % Lymphocytes 28 %    % Monocytes 11 %    % Eosinophils 1 %    % Basophils 2 %    % Immature Granulocytes 0 %    NRBCs per 100 WBC 0 <1 /100    Absolute Neutrophils 3.1 1.6 - 8.3 10e3/uL    Absolute Lymphocytes 1.5 0.8 - 5.3 10e3/uL    Absolute Monocytes 0.6 0.0 - 1.3 10e3/uL    Absolute Eosinophils 0.1 0.0 - 0.7 10e3/uL    Absolute Basophils 0.1 0.0 - 0.2 10e3/uL    Absolute Immature Granulocytes 0.0 <=0.4 10e3/uL    Absolute NRBCs 0.0 10e3/uL   Asymptomatic COVID-19 Virus (Coronavirus) by PCR Nasopharyngeal    Collection Time: 04/10/23  2:41 PM    Specimen: Nasopharyngeal; Swab   Result Value Ref Range    SARS CoV2 PCR Negative Negative   Alcohol breath test POCT    Collection Time: 04/10/23  3:14 PM   Result Value Ref Range    Alcohol Breath Test 0.421 (A) 0.00 - 0.01   Drug abuse screen 1 urine (ED)     "Collection Time: 04/10/23  5:29 PM   Result Value Ref Range    Amphetamines Urine Screen Negative Screen Negative    Barbituates Urine Screen Negative Screen Negative    Benzodiazepine Urine Screen Negative Screen Negative    Cannabinoids Urine Screen Negative Screen Negative    Cocaine Urine Screen Negative Screen Negative    Opiates Urine Screen Negative Screen Negative          Physical and Psychiatric Examination:     BP (!) 144/90 (BP Location: Left arm, Patient Position: Sitting)   Pulse 93   Temp 98.6  F (37  C) (Oral)   Resp 16   Ht 1.727 m (5' 8\")   Wt 49.9 kg (110 lb)   SpO2 97%   BMI 16.73 kg/m    Weight is 110 lbs 0 oz  Body mass index is 16.73 kg/m .    Physical Exam:  I have reviewed the physical exam as documented by the medical team and agree with findings and assessment and have no additional findings to add at this time.    Mental Status Exam:  Appearance: awake, alert and adequately groomed  Attitude:  cooperative  Eye Contact:  good  Mood:  anxious  Affect:  mood congruent  Speech:  clear, coherent  Language: fluent and intact in English  Psychomotor, Gait, Musculoskeletal:  no evidence of tardive dyskinesia, dystonia, or tics  Thought Process:  goal oriented  Associations:  no loose associations  Thought Content:  no evidence of suicidal ideation or homicidal ideation and no evidence of psychotic thought  Insight:  fair  Judgement:  intact  Oriented to:  time, person, and place  Attention Span and Concentration:  intact  Recent and Remote Memory:  fair  Fund of Knowledge:  appropriate         Admission Diagnoses:     Alcohol dependence with intoxication with complication (H)  History of opiate use disorder, on Suboxone maintenance  Nicotine dependence with current use   ROOPA         Assessment & Plan:     1) MSSA with Valium.   2) Continue Suboxone.   3) Nicotine replacement available.   4) Will restart Campral after detox is completed.   5) May add PRN gabapentin or clonidine after detox. "   6) Patient open to CD treatment.     Disposition Plan   Reason for ongoing admission: requires detoxification from substance that poses a risk of bodily harm during withdrawal period  Discharge location: Chemical dependency treatment facility  Discharge Medications: not ordered  Follow-up Appointments: not scheduled  Legal Status: voluntary    Entered by: Hiren Garza MD on 4/11/2023 at 6:18 AM

## 2023-04-11 NOTE — PLAN OF CARE
Goal Outcome Evaluation:    Plan of Care Reviewed With: patient        Patient pleasant and cooperative, visible in milieu.  Flat affect, denies SI/SIB, depression/anxiety.  Continue to detox from opiates/ alcohol. COW's 9 and 7, MSSA 8 and 8.  Received 10 mg valium x2 per [facility] protocol.  Patient also on soboxone maintenance.  Good appetite, medication compliant. Patient stayed in his room most of the time this shift.  Patient would like to go to treatment after detox.  Resting comfortably in room, will continue to monitor closely.

## 2023-04-11 NOTE — PLAN OF CARE
Behavioral Team Discussion: (4/11/2023)    Continued Stay Criteria/Rationale: Patient admitted for Polysubstance and Alcohol  Use Disorder.  Plan: The following services will be provided to the patient; psychiatric assessment, medication management, therapeutic milieu, individual and group support, and skills groups.   Participants: 3A Provider: Dr. Hiren Garza MD; 3A RN: Jerry Morris, RN; 3A CM's: Wesley Gerber.  Summary/Recommendation: Providers will assess today for treatment recommendations, discharge planning, and aftercare plans. CM will meet with pt for discharge planning.   Medical/Physical: Patient denied any medical and physical concerns at this time.   Precautions:   Behavioral Orders   Procedures     Code 1 - Restrict to Unit     Fall precautions     Routine Programming     As clinically indicated     Seizure precautions     Status 15     Every 15 minutes.     Withdrawal precautions     Rationale for change in precautions or plan: N/A  Progress: Initial.    ASAM Dimension Scale Ratings:  Dimension 1: 3 Client tolerates and mariam with withdrawal discomfort poorly. Client has severe intoxication, such that the client endangers self or others, or intoxication has not abated with less intensive levels of services. Client displays severe signs and symptoms; or risk of severe, but manageable withdrawal; or withdrawal worsening despite detox at less intensive level.  Dimension 2: 1 Client tolerates and mariam with physical discomfort and is able to get the services that the client needs.  Dimension 3: 2 Client has difficulty with impulse control and lacks coping skills. Client has thoughts of suicide or harm to others without means; however, the thoughts may interfere with participation in some treatment activities. Client has difficulty functioning in significant life areas. Client has moderate symptoms of emotional, behavioral, or cognitive problems. Client is able to participate in most treatment  activities.  Dimension 4: 3 Client displays inconsistent compliance, minimal awareness of either the client's addiction or mental disorder, and is minimally cooperative.  Dimension 5: 3 Client has poor recognition and understanding of relapse and recidivism issues and displays moderately high vulnerability for further substance use or mental health problems. Client has few coping skills and rarely applies coping skills.  Dimension 6: 3 Client is not engaged in structured, meaningful activity and the client's peers, family, significant other, and living environment are unsupportive, or there is significant criminal justice system involvement.

## 2023-04-12 LAB
ATRIAL RATE - MUSE: 96 BPM
DIASTOLIC BLOOD PRESSURE - MUSE: NORMAL MMHG
INTERPRETATION ECG - MUSE: NORMAL
P AXIS - MUSE: 82 DEGREES
PR INTERVAL - MUSE: 138 MS
QRS DURATION - MUSE: 80 MS
QT - MUSE: 394 MS
QTC - MUSE: 497 MS
R AXIS - MUSE: 65 DEGREES
SYSTOLIC BLOOD PRESSURE - MUSE: NORMAL MMHG
T AXIS - MUSE: 95 DEGREES
VENTRICULAR RATE- MUSE: 96 BPM

## 2023-04-12 PROCEDURE — 99233 SBSQ HOSP IP/OBS HIGH 50: CPT | Performed by: PSYCHIATRY & NEUROLOGY

## 2023-04-12 PROCEDURE — 250N000013 HC RX MED GY IP 250 OP 250 PS 637: Performed by: PSYCHIATRY & NEUROLOGY

## 2023-04-12 PROCEDURE — 128N000004 HC R&B CD ADULT

## 2023-04-12 RX ADMIN — DIAZEPAM 5 MG: 5 TABLET ORAL at 00:31

## 2023-04-12 RX ADMIN — NICOTINE 1 PATCH: 21 PATCH, EXTENDED RELEASE TRANSDERMAL at 08:39

## 2023-04-12 RX ADMIN — DIAZEPAM 10 MG: 5 TABLET ORAL at 08:40

## 2023-04-12 RX ADMIN — THIAMINE HCL TAB 100 MG 100 MG: 100 TAB at 08:40

## 2023-04-12 RX ADMIN — DIAZEPAM 10 MG: 5 TABLET ORAL at 04:32

## 2023-04-12 RX ADMIN — FOLIC ACID 1 MG: 1 TABLET ORAL at 08:40

## 2023-04-12 RX ADMIN — BUPRENORPHINE AND NALOXONE 1 TABLET: 2; .5 TABLET SUBLINGUAL at 20:05

## 2023-04-12 RX ADMIN — DIAZEPAM 10 MG: 5 TABLET ORAL at 12:38

## 2023-04-12 RX ADMIN — BUPRENORPHINE AND NALOXONE 1 TABLET: 2; .5 TABLET SUBLINGUAL at 08:40

## 2023-04-12 RX ADMIN — MULTIPLE VITAMINS W/ MINERALS TAB 1 TABLET: TAB at 08:39

## 2023-04-12 ASSESSMENT — ACTIVITIES OF DAILY LIVING (ADL)
ADLS_ACUITY_SCORE: 28
HYGIENE/GROOMING: INDEPENDENT
ORAL_HYGIENE: INDEPENDENT
HYGIENE/GROOMING: INDEPENDENT
ADLS_ACUITY_SCORE: 28
ORAL_HYGIENE: INDEPENDENT
ADLS_ACUITY_SCORE: 28
LAUNDRY: WITH SUPERVISION
DRESS: INDEPENDENT
ADLS_ACUITY_SCORE: 28
DRESS: STREET CLOTHES
ADLS_ACUITY_SCORE: 28

## 2023-04-12 NOTE — PROGRESS NOTES
Patient was encouraged to fill out assessment as he would like to attend IP treatment. He stated that there is pamplets in his property however there is nothing in his property.   Patient said his plan is to discharge, get clothes from home and work, then attend IP treatment.    At [id number] this writer received a phone call from [facility] stating that the patients sister called the unit and claimed that the patient called her telling her he was wondering the streets.

## 2023-04-12 NOTE — PLAN OF CARE
Goal Outcome Evaluation:    Plan of Care Reviewed With: patient    Patient pleasant and cooperative, visible in milieu.  Flat affect, denies SI/SIB, depression, endorsed anxiety.  Patient claimed he's anxious about being here.  MSSA 9 and 8 , Cow's 6 and 5.  Received 10 mg valium x2 this shift.  Patient on soboxone maintenance. Good appetite, medication compliant.  Patient isolated to self, no interaction with peers.  Patient presently awake in room.   Will continue to monitor  closely.

## 2023-04-12 NOTE — PLAN OF CARE
Goal Outcome Evaluation:       Patient slept on and off for 6.25 hours. His MSSA score at midnight was 8. Valium 5 mgs given as withdrawal protocol. At 0400 MSSA was 8. Valium 10 mgs given (Pls see MAR). His BP ranges from 127//89; P-91-93. No complaints made.

## 2023-04-12 NOTE — PROGRESS NOTES
"[facility], [place]   Psychiatric Progress Note        Interim History:   The patient's care was discussed with the treatment team during the daily team meeting and/or staff's chart notes were reviewed.  Staff report patient is still in withdrawal. Planning to discharge home before residential treatment.     The patient reports that he is doing better. Says that the withdrawal is \"fine.\" Sleeping and eating well. Denies depression or anxiety. Denies SI. Working on his paperwork for CD treatment. Says that he will need \"to pack some clothes\" before he can enter CD treatment.          Medications:       buprenorphine-naloxone  1 tablet Sublingual BID     folic acid  1 mg Oral Daily     multivitamin w/minerals  1 tablet Oral Daily     nicotine  1 patch Transdermal Daily     nicotine   Transdermal Q8H     thiamine  100 mg Oral Daily          Allergies:   No Known Allergies       Labs:   No results found for this or any previous visit (from the past 24 hour(s)).       Psychiatric Examination:     /82   Pulse 112   Temp 97.9  F (36.6  C) (Oral)   Resp 16   Ht 1.727 m (5' 8\")   Wt 49.9 kg (110 lb)   SpO2 98%   BMI 16.73 kg/m    Weight is 110 lbs 0 oz  Body mass index is 16.73 kg/m .  Orthostatic Vitals     None            Appearance: awake, alert and adequately groomed  Attitude:  cooperative  Eye Contact:  good  Mood:  good  Affect:  mood congruent  Speech:  clear, coherent  Psychomotor Behavior:  no evidence of tardive dyskinesia, dystonia, or tics  Thought Process:  goal oriented  Associations:  no loose associations  Thought Content:  no evidence of suicidal ideation or homicidal ideation and no evidence of psychotic thought  Insight:  fair  Judgement:  fair  Oriented to:  time, person, and place  Attention Span and Concentration:  intact  Recent and Remote Memory:  fair         Precautions:     Behavioral Orders   Procedures     Code 1 - Restrict to Unit     Fall precautions "     Routine Programming     As clinically indicated     Seizure precautions     Status 15     Every 15 minutes.     Withdrawal precautions          Diagnoses:     Alcohol dependence with intoxication with complication (H)  History of opiate use disorder, on Suboxone maintenance  Nicotine dependence with current use   ROOPA          Plan:      1) Continue MSSA with Valium.   2) Continue Suboxone.   3) Nicotine replacement available.   4) Will restart Campral after detox is completed.   5) Patient open to CD treatment. Working on paperwork.         Disposition Plan   Reason for ongoing admission: requires detoxification from substance that poses a risk of bodily harm during withdrawal period  Discharge location: Chemical dependency treatment facility  Discharge Medications: not ordered  Follow-up Appointments: not scheduled  Legal Status: voluntary    Entered by: Hiren Garza MD on April 12, 2023 at 12:20 PM

## 2023-04-13 ENCOUNTER — TELEPHONE (OUTPATIENT)
Dept: BEHAVIORAL HEALTH | Facility: CLINIC | Age: 53
End: 2023-04-13
Payer: COMMERCIAL

## 2023-04-13 PROCEDURE — 250N000013 HC RX MED GY IP 250 OP 250 PS 637: Performed by: PSYCHIATRY & NEUROLOGY

## 2023-04-13 PROCEDURE — 128N000004 HC R&B CD ADULT

## 2023-04-13 PROCEDURE — 99233 SBSQ HOSP IP/OBS HIGH 50: CPT | Performed by: PSYCHIATRY & NEUROLOGY

## 2023-04-13 PROCEDURE — 250N000013 HC RX MED GY IP 250 OP 250 PS 637: Performed by: FAMILY MEDICINE

## 2023-04-13 RX ORDER — ACAMPROSATE CALCIUM 333 MG/1
666 TABLET, DELAYED RELEASE ORAL 3 TIMES DAILY
Qty: 180 TABLET | Refills: 3 | Status: SHIPPED | OUTPATIENT
Start: 2023-04-13 | End: 2023-06-13

## 2023-04-13 RX ORDER — NICOTINE 21 MG/24HR
1 PATCH, TRANSDERMAL 24 HOURS TRANSDERMAL DAILY
Qty: 28 PATCH | Refills: 1 | Status: SHIPPED | OUTPATIENT
Start: 2023-04-14 | End: 2024-01-23

## 2023-04-13 RX ORDER — POLYETHYLENE GLYCOL 3350 17 G
2 POWDER IN PACKET (EA) ORAL
Qty: 100 LOZENGE | Refills: 1 | Status: SHIPPED | OUTPATIENT
Start: 2023-04-13 | End: 2024-01-23

## 2023-04-13 RX ORDER — MULTIPLE VITAMINS W/ MINERALS TAB 9MG-400MCG
1 TAB ORAL DAILY
Qty: 30 TABLET | Refills: 1 | Status: SHIPPED | OUTPATIENT
Start: 2023-04-14 | End: 2023-06-13

## 2023-04-13 RX ORDER — LANOLIN ALCOHOL/MO/W.PET/CERES
100 CREAM (GRAM) TOPICAL DAILY
Qty: 30 TABLET | Refills: 0 | Status: SHIPPED | OUTPATIENT
Start: 2023-04-14 | End: 2023-06-13

## 2023-04-13 RX ORDER — FOLIC ACID 1 MG/1
1 TABLET ORAL DAILY
Qty: 30 TABLET | Refills: 1 | Status: SHIPPED | OUTPATIENT
Start: 2023-04-14 | End: 2023-06-13

## 2023-04-13 RX ORDER — BUPRENORPHINE HYDROCHLORIDE AND NALOXONE HYDROCHLORIDE DIHYDRATE 2; .5 MG/1; MG/1
1 TABLET SUBLINGUAL 2 TIMES DAILY
Qty: 28 TABLET | Refills: 0 | Status: SHIPPED | OUTPATIENT
Start: 2023-04-13 | End: 2023-06-02

## 2023-04-13 RX ADMIN — THIAMINE HCL TAB 100 MG 100 MG: 100 TAB at 08:26

## 2023-04-13 RX ADMIN — BUPRENORPHINE AND NALOXONE 1 TABLET: 2; .5 TABLET SUBLINGUAL at 19:27

## 2023-04-13 RX ADMIN — NICOTINE POLACRILEX 2 MG: 2 LOZENGE ORAL at 06:22

## 2023-04-13 RX ADMIN — MULTIPLE VITAMINS W/ MINERALS TAB 1 TABLET: TAB at 08:25

## 2023-04-13 RX ADMIN — NICOTINE POLACRILEX 2 MG: 2 LOZENGE ORAL at 16:26

## 2023-04-13 RX ADMIN — FOLIC ACID 1 MG: 1 TABLET ORAL at 08:25

## 2023-04-13 RX ADMIN — BUPRENORPHINE AND NALOXONE 1 TABLET: 2; .5 TABLET SUBLINGUAL at 08:25

## 2023-04-13 RX ADMIN — NICOTINE 1 PATCH: 21 PATCH, EXTENDED RELEASE TRANSDERMAL at 08:25

## 2023-04-13 ASSESSMENT — ANXIETY QUESTIONNAIRES
2. NOT BEING ABLE TO STOP OR CONTROL WORRYING: NOT AT ALL
IF YOU CHECKED OFF ANY PROBLEMS ON THIS QUESTIONNAIRE, HOW DIFFICULT HAVE THESE PROBLEMS MADE IT FOR YOU TO DO YOUR WORK, TAKE CARE OF THINGS AT HOME, OR GET ALONG WITH OTHER PEOPLE: NOT DIFFICULT AT ALL
3. WORRYING TOO MUCH ABOUT DIFFERENT THINGS: NOT AT ALL
6. BECOMING EASILY ANNOYED OR IRRITABLE: NOT AT ALL
1. FEELING NERVOUS, ANXIOUS, OR ON EDGE: NOT AT ALL
GAD7 TOTAL SCORE: 0
5. BEING SO RESTLESS THAT IT IS HARD TO SIT STILL: NOT AT ALL
GAD7 TOTAL SCORE: 0
7. FEELING AFRAID AS IF SOMETHING AWFUL MIGHT HAPPEN: NOT AT ALL
4. TROUBLE RELAXING: NOT AT ALL

## 2023-04-13 ASSESSMENT — ACTIVITIES OF DAILY LIVING (ADL)
ADLS_ACUITY_SCORE: 28
ADLS_ACUITY_SCORE: 28
HYGIENE/GROOMING: INDEPENDENT
ADLS_ACUITY_SCORE: 28
ADLS_ACUITY_SCORE: 28
DRESS: STREET CLOTHES
ORAL_HYGIENE: INDEPENDENT
ADLS_ACUITY_SCORE: 28
DRESS: INDEPENDENT
ADLS_ACUITY_SCORE: 28
ADLS_ACUITY_SCORE: 28
ORAL_HYGIENE: INDEPENDENT
LAUNDRY: WITH SUPERVISION
ADLS_ACUITY_SCORE: 28
ADLS_ACUITY_SCORE: 28
HYGIENE/GROOMING: INDEPENDENT
LAUNDRY: WITH SUPERVISION
ADLS_ACUITY_SCORE: 28

## 2023-04-13 ASSESSMENT — PATIENT HEALTH QUESTIONNAIRE - PHQ9: SUM OF ALL RESPONSES TO PHQ QUESTIONS 1-9: 0

## 2023-04-13 NOTE — PLAN OF CARE
"Pt continues in out of detox status, per unit protocol, for alcohol withdrawal. Pt scored COWS 4, 2. Pt received scheduled suboxone dose this shift.   Pt denied anxiety and depression. Pt denied SI and SIB. Pt denied pain. Pt alert. Pt independent with steady gait.   Pt approached writer stating he is to discharge today. Writer reminded Pt his discharge was canceled today in order to follow up with treatment options tomorrow as reported by shift change report - Pt verbalized \"Yes. I forgot\". Pt stated he still plans to return home for his belongings before going to inpatient treatment. Writer verbalized discharge plans and treatment options can be further discussed with the provider and case management tomorrow - Pt agreeable.   Pt presents as pleasant, cooperative. Pt intermittently visible in the milieu - withdrawn to self, snacks, watching TV, resting in own room.   Pt reports good appetite. Pt reports he ate 75% of dinner.   Pt compliant with scheduled medications. Pt received PRN nicotine lozenge 2 mg 1 time this shift.   Writer spoke with Dr. Garza over the telephone at 1630 to report most recent EKG results. PRN hydroxyzine and PRN trazodone discontinued this shift by provider due to prolonged QT on most recent EKG.  VSS throughout the shift. Latest VS: Blood pressure 119/77, pulse 88, temperature 98.7  F (37.1  C), temperature source Oral, resp. rate 16, height 1.727 m (5' 8\"), weight 49.9 kg (110 lb), SpO2 99 %.  Precautions: seizure, fall    Problem: Plan of Care - These are the overarching goals to be used throughout the patient stay.    Goal: Optimal Comfort and Wellbeing  Outcome: Progressing     Problem: Plan of Care - These are the overarching goals to be used throughout the patient stay.    Goal: Readiness for Transition of Care  Outcome: Progressing     Problem: Adult Behavioral Health Plan of Care  Goal: Plan of Care Review  Outcome: Progressing  Flowsheets (Taken 4/13/2023 1612)  Patient " Agreement with Plan of Care: agrees     Problem: Adult Behavioral Health Plan of Care  Goal: Optimized Coping Skills in Response to Life Stressors  Outcome: Progressing     Problem: Adult Behavioral Health Plan of Care  Goal: Develops/Participates in Therapeutic [place] to Support Successful Transition  Outcome: Progressing     Problem: Alcohol Withdrawal  Goal: Alcohol Withdrawal Symptom Control  Outcome: Progressing     Problem: Suicidal Behavior  Goal: Suicidal Behavior is Absent or Managed  Outcome: Progressing

## 2023-04-13 NOTE — PLAN OF CARE
Problem: Alcohol Withdrawal  Goal: Alcohol Withdrawal Symptom Control  Outcome: Progressing   Goal Outcome Evaluation:    Plan of Care Reviewed With: patient

## 2023-04-13 NOTE — PROGRESS NOTES
Writer checked in with the patient to clarify on whether they needed transportation to [facility]. The patient stated they did need assistance and this writer informed that we would set this up for the patient.     This writer then requested a cab be set up for the patient with [facility], providing the 2 pm check in timeframe and location of [facility] then set up the cab service and confirmed with this writer that the cab would arrive at 1 pm.

## 2023-04-13 NOTE — PROGRESS NOTES
"Spoke with the patients ex chelsey regarding the patient not attending [facility] and she stated the following, \"He is homeless and he is not allowed her and he is not on the lease. He called yesterday and lied about being released and that he was wondering the streets and wanted me to pick to come pick him up\"    This writer relayed this information to the DrDalia And his anticipated discharge has been moved to 4/14    This writer spoke to the patient and he agreed to this and also agreed to have his assessment completed today if possible.     "

## 2023-04-13 NOTE — PROGRESS NOTES
"  Unit 3A    UNIVERSAL ADULT DIAGNOSTIC ASSESSMENT - Substance Use Disorder    Provider Name and Credentials: Wesley Gerber MS, [facility]    PATIENT'S NAME: Justus Cope  PREFERRED NAME: Steven  PRONOUNS: he/him/his     MRN: [id number]  : 1970   Last 4 SSN: [id number]  ACCT. NUMBER:  [id number]  DATE OF SERVICE: 2023   START TIME: 12:00pm  END TIME: 1:00pm  PREFERRED PHONE: [phone]  EMAIL: [email or web address]   May we leave a program related message: Yes  SERVICE MODALITY:  In-person      Identifying Information:  Patient is a 53 year old,  male who was referred for an assessment by self. The pronoun use throughout this assessment reflects the patient's chosen pronoun. Patient attended the session alone.     Chief Complaint:   The reason for seeking services at this time is: \"wanted to get sober and dry out\"  The problem(s) began at the age of 30 years old. Patient has not attempted to resolve these concerns in the past.  Patient does not appear to be in severe withdrawal, an imminent safety risk to self or others, or requiring immediate medical attention and may proceed with the assessment interview.    Social/Family History:  Patient reported he grew up in [place], [place], and [place]. Patient was raised by mother and father. Patient reported that his childhood was excellent.  Patient describes current relationships with family of origin as positive.      The patient describes his cultural background as [place].  Cultural influences and impact on patient's life structure, values, norms, and healthcare: Patient denied.  Contextual influences on patient's health include: Contextual Factors: Individual Factors MH/CD issues .  Patient identified his preferred language to be English. Patient reported he does not need the assistance of an  or other support involved in therapy.     Patient reported had no significant delays in developmental tasks.  Patient's highest education level was high " school graduate and some college. Patient identified the following learning problems: none reported.  Patient reports he is able to understand written materials.    Patient's current relationship status is in a relationship  for 17 years.   Patient identified his sexual orientation as straight.  Patient reported having zero child(romelia).     Patient's current living/housing situation involves staying in own home/apartment.  Patient lives with chelsey and he reports that housing is stable. Patient identified partner, friends and co-worker as part of his support system.  Patient identified the quality of these relationships as stable and meaningful.      Patient reports he is not involved in [employer] activities. Patients reports spirituality impacts his recovery in the following ways:  Patient denies.     Patient reports engaging in the following recreational/leisure activities: walking dog, hiking, and work. Patient reports engaging in the following recreation/leisure activities while using: staying in the house. Patient reports the following people are supportive of recovery: partner, co-worker, friends, mother in law.  Patient is currently employed full time and reports they are able to function appropriately at work..  Patient reports his income is obtained through employment.  Patient does not identify finances as a current stressor. Cultural and socioeconomic factors do not affect the patient's access to services.    Patient denies substance related arrests or legal issues.  Patient denies being on probation / parole / under the jurisdiction of the court.    Patient's Strengths and Limitations:  Patient identified the following strengths or resources that will help him succeed in treatment: friends, family, and work situation. Things that may interfere with the patient's success in treatment include: none identified.     Assessments:  The following assessments were completed by patient for this  visit:  PHQ9:       11/16/2022     9:00 AM 12/5/2022     8:00 AM 2/2/2023     9:53 AM 2/21/2023    10:00 AM 3/7/2023     9:00 AM 3/23/2023     1:00 PM 4/13/2023    12:28 PM   PHQ-9 SCORE   PHQ-9 Total Score MyChart   2 (Minimal depression)       PHQ-9 Total Score 0 0 2    2    2 0 0 0 0     GAD7:       10/21/2021    10:55 AM 2/16/2022     9:08 AM 4/29/2022     8:18 AM 10/18/2022    10:00 AM 4/13/2023    12:28 PM   ROOPA-7 SCORE   Total Score 3 (minimal anxiety) 3 (minimal anxiety) 0 (minimal anxiety)     Total Score 3 3 0 3 0     PROMIS 10-Global Health (all questions and answers displayed):       10/18/2022    10:00 AM 12/5/2022     8:00 AM 2/2/2023    10:09 AM 4/13/2023    12:00 PM   PROMIS 10   In general, would you say your health is:   Good    In general, would you say your quality of life is:   Good    In general, how would you rate your physical health?   Good    In general, how would you rate your mental health, including your mood and your ability to think?   Good    In general, how would you rate your satisfaction with your social activities and relationships?   Good    In general, please rate how well you carry out your usual social activities and roles   Good    To what extent are you able to carry out your everyday physical activities such as walking, climbing stairs, carrying groceries, or moving a chair?   Completely    In the past 7 days, how often have you been bothered by emotional problems such as feeling anxious, depressed, or irritable?   Rarely    In the past 7 days, how would you rate your fatigue on average?   Mild    In the past 7 days, how would you rate your pain on average, where 0 means no pain, and 10 means worst imaginable pain?   4    In general, would you say your health is: 2 3 3    3 3   In general, would you say your quality of life is: 3 3 3    3 4   In general, how would you rate your physical health? 2 4 3    3 5   In general, how would you rate your mental health, including your  mood and your ability to think? 3 3 3    3 5   In general, how would you rate your satisfaction with your social activities and relationships? 2 3 3    3 5   In general, please rate how well you carry out your usual social activities and roles. (This includes activities at home, at work and in your community, and responsibilities as a parent, child, spouse, employee, friend, etc.) 3 4 3    3 5   To what extent are you able to carry out your everyday physical activities such as walking, climbing stairs, carrying groceries, or moving a chair? 5 4 5    5 5   In the past 7 days, how often have you been bothered by emotional problems such as feeling anxious, depressed, or irritable? 3 3 2    2 2   In the past 7 days, how would you rate your fatigue on average? 2 2 2    2 2   In the past 7 days, how would you rate your pain on average, where 0 means no pain, and 10 means worst imaginable pain? 0 4 4    4 7   Global Mental Health Score 11 12 13    13 18   Global Physical Health Score 16 15 15    15 16   PROMIS TOTAL - SUBSCORES 27 27 28    28 34     GAIN-sliding scale:      10/18/2022    10:00 AM 12/5/2022     8:00 AM 4/13/2023    12:29 PM   When was the last time that you had significant problems...   with feeling very trapped, lonely, sad, blue, depressed or hopeless about the future? Past month 2 to 12 months ago Never   with sleep trouble, such as bad dreams, sleeping restlessly, or falling asleep during the day? Never Never 1+ years ago   with feeling very anxious, nervous, tense, scared, panicked or like something bad was going to happen? Past month 2 to 12 months ago Never   with becoming very distressed & upset when something reminded you of the past? Past month Past month Never   with thinking about ending your life or committing suicide? Never Never Never          10/18/2022    10:00 AM 12/5/2022     8:00 AM 4/13/2023    12:29 PM   When was the last time that you did the following things 2 or more times?   [place]  or conned to get things you wanted or to avoid having to do something? 1+ years ago 2 to 12 months ago Past month   Had a hard time paying attention at school, work or home? Never Never Never   Had a hard time listening to instructions at school, work or home? Never Never Never   Were a bully or threatened other people? Never Never Never   Started physical fights with other people? Never Never Never       Personal and Family Medical History:   Patient did not report a family history of mental health concerns.  Patient reports the following family history: Patient denies.   Family History   Problem Relation Age of Onset     Substance Abuse No family hx of         Patient reported the following previous mental health diagnoses: Patient denies.  Patient reports his primary mental health symptoms include: Patient denies and these do not impact his ability to function.   Patient has not received mental health services in the past: .  Psychiatric Hospitalizations: None.  Patient denies a history of civil commitment.  Current mental health services/providers include:   at [facility].    Patient has not had a physical exam to rule out medical causes for current symptoms.  Date of last physical exam was within the past year. Symptoms have developed since last physical exam and client was encouraged to follow up with PCP.  . The patient has a [place] Primary Care Provider, who is named No Ref-Primary, Physician.. Patient reports the following current medical concerns: neck disc.  Patient reports pain concerns including Crushed in neck.  Patient does want help addressing pain concerns.  Patient denies pregnancy. There are not significant appetite / nutritional concerns / weight changes. Patient does not report a history of an eating disorder. Patient does not report a history of head injury / trauma / cognitive impairment.      Patient reports current meds as:   Outpatient Medications Marked as Taking for the  4/10/23 encounter (Hospital Encounter)   Medication Sig     acamprosate (CAMPRAL) 333 MG EC tablet Take 2 tablets (666 mg) by mouth 3 times daily     buprenorphine-naloxone (SUBOXONE) 2-0.5 MG SUBL sublingual tablet Place 1 tablet under the tongue 2 times daily     cloNIDine (CATAPRES) 0.1 MG tablet Take 1 tablet (0.1 mg) by mouth 2 times daily as needed (anxiety, high bp)     [START ON 4/14/2023] folic acid (FOLVITE) 1 MG tablet Take 1 tablet (1 mg) by mouth daily     gabapentin (NEURONTIN) 300 MG capsule Take 1 capsule (300 mg) by mouth 2 times daily as needed for other (alcohol cravings or withdrawal symptoms)     [START ON 4/14/2023] multivitamin w/minerals (THERA-VIT-M) tablet Take 1 tablet by mouth daily     naloxone (NARCAN) 4 MG/0.1ML nasal spray Spray 1 spray (4 mg) into one nostril alternating nostrils as needed for opioid reversal every 2-3 minutes until assistance arrives     nicotine (COMMIT) 2 MG lozenge Place 1 lozenge (2 mg) inside cheek every hour as needed for smoking cessation     [START ON 4/14/2023] nicotine (NICODERM CQ) 21 MG/24HR 24 hr patch Place 1 patch onto the skin daily     [START ON 4/14/2023] thiamine (B-1) 100 MG tablet Take 1 tablet (100 mg) by mouth daily       Medication Adherence:  Patient reports taking prescribed medications as prescribed.    Patient Allergies:  No Known Allergies    Medical History:    Past Medical History:   Diagnosis Date     Childhood asthma      Chronic neck pain      H/O anxiety disorder      H/O: depression      Opioid use disorder        Substance Use:  Patient reported no family history of chemical health issues. Patient has not received substance use disorder and/or gambling treatment in the past. Patient has not been to detox. Patient is not currently receiving any chemical dependency treatment. Patient reports no history of support group attendance.        Substance Age of first use Pattern and duration of use (include amounts and frequency) Date of  last use     Withdrawal potential Route of administration   Has used Alcohol 16-21 3 beers every other day  04/09/23 No oral   Has not used Marijuana            Has not used Amphetamines          Has not used Cocaine/ crack           Has not used Hallucinogens        Has not used Inhalants        Has not used Heroin        Has not used Other Opiates        Has not used Benzodiazepine          Has not used Barbiturates        Has not used Over the counter meds.        Has not used Caffeine        Has not used Nicotine         Has not used other substances not listed above:  Identify:              Patient reported the following problems as a result of their substance use: relationship problems.  Patient is concerned about substance use.     Patient reports experiencing the following withdrawal symptoms within the past 12 months: none and the following within the past 30 days: shaky/jittery/tremors and muscle aches.   Patients reports urges to use Alcohol.  Patient reports he has used more Alcohol than intended and over a longer period of time than intended. Patient reports he has not had unsuccessful attempts to cut down or control use of Alcohol.  Patient reports longest period of abstinence was 1 year in 2008, and 1 month in 2023 and return to use was due to split up with fiance and starting hanging with friends. Patient reports he has needed to use more Alcohol to achieve the same effect.  Patient does  report diminished effect with use of same amount of Alcohol.     Patient does  report a great deal of time is spent in activities necessary to obtain, use, or recover from Alcohol effects.  Patient does  report important social, occupational, or recreational activities are given up or reduced because of Alcohol use.  Alcohol use is continued despite knowledge of having a persistent or recurrent physical or psychological problem that is likely to have caused or exacerbated by use.  Patient reports the following  problem behaviors while under the influence of substances Patient denies.     Patient reports his recovery goals are abstinence.     Patient reports substance use has not impacted his ability to function in a school setting. Patient reports substance use has not impacted his ability to function in a work setting.  Patients demographics and history impact his recovery in the following ways:  Patient denies.     Patient does not have a history of gambling concerns and/or treatment. Patient does not have other addictive behaviors he is concerned about.         Significant Losses / Trauma / Abuse / Neglect Issues:   Patient did serve in the .  There are indications or report of significant loss, trauma, abuse or neglect issues related to: are no indications and client denies any losses, trauma, abuse, or neglect concerns.  Concerns for possible neglect are not present.     Safety Assessment:   Current Safety Concerns:  Hope Suicide Severity Rating Scale (Short Version)      2/17/2023     2:35 PM 2/21/2023     9:07 AM 4/10/2023     2:29 PM 4/10/2023     8:00 PM   Hope Suicide Severity Rating (Short Version)   Over the past 2 weeks have you felt down, depressed, or hopeless? yes no no    Over the past 2 weeks have you had thoughts of killing yourself? no no no    Have you ever attempted to kill yourself? no no no    Q1 Wished to be Dead (Past Month)    no   Q2 Suicidal Thoughts (Past Month)    no   Q3 Suicidal Thought Method    no   Q4 Suicidal Intent without Specific Plan    no   Q5 Suicide Intent with Specific Plan    no   Q6 Suicide Behavior (Lifetime)    no   Level of Risk per Screen    low risk     Patient denies current homicidal ideation and behaviors.  Patient denies current self-injurious ideation and behaviors.    Patient denied risk behaviors associated with substance use.  Patient denies any high risk behaviors associated with mental health symptoms.  Patient reports the following current  concerns for their personal safety: None.  Patient reports there are not firearms in the house. There are no firearms in the home..     History of Safety Concerns:  Patient denied a history of homicidal ideation.     Patient denied a history of personal safety concerns.    Patient denied a history of assaultive behaviors.    Patient denied a history of sexual assault behaviors.     Patient denied a history of risk behaviors associated with substance use.  Patient denies any history of high risk behaviors associated with mental health symptoms.  Patient reports the following protective factors: spirituality, positive relationships positive social network, sober network and positive family connections, dedication to family/friends, safe and stable environment, regular sleep, regular physical activity, sense of belonging to self, secure attachment, help seeking behaviors when distressed and wanting to use, abstinence from substances, adherence with prescribed medication, agreement to use safety plan, living with other people, daily obligations, structured day, uses community crisis resources, effective problem-solving skills, committment to well-being, sense of meaning, positive social skills, healthy fear of risky behaviors or pain, financial stability and strong sense of self-worth/esteem    Risk Plan:  See Recommendations for Safety and Risk Management Plan    Review of Symptoms per patient report:  Substance Use:  riding with someone under the influence     Collateral Contact Summary:   Collateral contacts contributing to this assessment:  None     If court related records were reviewed, summarize here: None    Information from collateral contacts supported/largely agreed with information from the client and associated risk ratings.    Information in this assessment was obtained from the medical record and provided by patient who is a good historian.    Patient will have open access to their mental health medical  record.    Diagnostic Criteria: 1.) Alcohol/drug is often taken in larger amounts or over a longer period than was intended.  Met for Alcohol.  2.) There is a persistent desire or unsuccessful efforts to cut down or control alcohol/drug use.  Met for Alcohol.  3.) A great deal of time is spent in activities necessary to obtain alcohol, use alcohol, or recover from its effects.  Met for Alcohol.  4.) Craving, or a strong desire or urge to use alcohol/drug.  Met for Alcohol.  5.) Recurrent alcohol/drug use resulting in a failure to fulfill major role obligations at work, school or home.  Met for Alcohol.  6.) Continued alcohol use despite having persistent or recurrent social or interpersonal problems caused or exacerbated by the effects of alcohol/drug.  Met for Alcohol.  7.) Important social, occupational, or recreational activities are given up or reduced because of alcohol/drug use.  Met for Alcohol.  8.) Recurrent alcohol/drug use in situations in which it is physically hazardous.  Met for Alcohol.  9.) Alcohol/drug use is continued despite knowledge of having a persistent or recurrent physical or psychological problem that is likely to have been caused or exacerbated by alcohol.  Met for Alcohol.  10.) Tolerance, as defined by either of the following: A need for markedly increased amounts of alcohol/drug to achieve intoxication or desired effect..  Met for Alcohol.  11.) Withdrawal, as manifested by either of the following: The characteristic withdrawal syndrome for alcohol/drug (refer to Criteria A and B of the criteria set for alcohol/drug withdrawal).. Met for Alcohol.     As evidenced by self report and criteria, client meets the following DSM5 Diagnoses:   (Sustained by DSM5 Criteria Listed Above)  Alcohol Use Disorder   303.90 (F10.20) Severe In a controlled environment.    Recommendations:     1. Plan for Safety and Risk Management:  Recommended that patient call 911 or go to the local ED should there be  a change in any of these risk factors..      Report to child / adult protection services was NA.     2. LAZAR[facility] Referrals:   Recommendations:  Patient should seek residential treatment and follow any and all recommendations as needed and requested.  Patient reports they are willing to follow these recommendations.     Patient would like the following family or other support people involved in their treatment: [place]. Patient does not have a history of opiate use.    3. Mental Health Referrals:  Patient should continue or seek professional mental health services such as a therapist and psychiatrist to learn how his substance use overall effects his mental health.       4. Patient identified no cultural concerns that need to be addressed in treatment.    5. Recommendations for treatment focus:   Alcohol / Substance Use - tolerance, withdrawal, progressive use, loss of control, cravings. Patient may lack insight into relapse triggers/cues. Patient may lack coping skills at this time. Patient may be at high risk for relapse at this time. .     Clinical Substantiation:  Summary: Discussed with pt their desired outcome; reviewed living situation and community supports; reviewed type of use and risk factors for continued use. Risk ratings/justification below:   Dimension 1 -  Acute Intoxication/Withdrawal: 0 - No Problem Patient is medically stable and cleared for discharge.   Dimension 2 - Biomedical: 2 - Moderate Problem Patient reports having a primary care provider at this time. Patient reports he has had a physical in the last year. Patient reports he has pain in his neck and was encouraged to seek medical treatment and address any and all concerns as needed and recommended.   Dimension 3 - Emotional/Behavioral/Cognitive Conditions: 2 - Moderate Problem Patient should continue or seek professional mental health services such as a therapist and psychiatrist to learn how his substance use overall effects his mental health.      Dimension 4 - Readiness to Change:  1 - Minor Problem Patient endorses external and internal motivators for change. Patient may lack insight into changes needed to sustain sobriety at this time. Patient appeared to be in the preparation stage of change at this time.   Dimension 5 - Relapse/Continued Use/ Continued Problem Potential: 4 - Extreme Problem tolerance, withdrawal, progressive use, loss of control, cravings. Patient may lack insight into relapse triggers/cues. Patient may lack coping skills at this time. Patient may be at high risk for relapse at this time. .   Dimension 6 - Recovery Environment:  3 - Severe Problem Patient reports he lives with his fiance and reports housing is stable. Patient may lack social sober support at this time. Patient may lack insight into structure, engagement, and routine into postitive activities.     Placement/Program/Barriers Identified: None     Referral: Residential Treatment       DAANES Assessment ID: [id number]    Provider Name/ Credentials:  Wesley Gerber MS, [facility]  April 13, 2023

## 2023-04-13 NOTE — TELEPHONE ENCOUNTER
"RN reviewed telephone call received today from an \"ex-wife\". No name attached. RN reviewed ROBBIE/Consent to communicate and there is only one active for chelsey Yuan.     RN called patient. There was no answer. RN left basic message on patients personal VM to call the clinic back regarding a request received to release information.     RN reviewed chart. Patient admitted from the ED to the hospital on 4/10/23 with admission request for 3A.     Routing to provider last seen in  for review.     Deann Flores RN on 4/13/2023 at 9:12 AM    "

## 2023-04-13 NOTE — PROGRESS NOTES
Writer spoke with patient regarding discharge planning and after care plans, patient stated he is already set up with treatment through his family and denies any resources from case management at this time.

## 2023-04-13 NOTE — PROGRESS NOTES
"Received a message from the patients chelsey and when returning the call, she stated that they have a bed for the patient at [facility] and that they would come and pick him up.     This writer called [facility] and they had no record of them holding a bed for the patient and that they would not have a bed until next week.    The sisters chelsey olmstead this time,  stated that when they called, they asked what they would \"need\" not that they had a bed.     The patient completed the assessment paperwork this morning however the patient is now up for discharge. This writer encouraged the patient to stay until the assessment was done so he could go from here directly to treatment however the patient was persistent and stated that he will go to treatment, but wants to leave to grab clothes and talk to work.    "

## 2023-04-13 NOTE — TELEPHONE ENCOUNTER
Reason for call:  Other   Patient called regarding (reason for call): call back  Additional comments: ex wife is requesting information on patient. Assessment notes, labs, results. To be faxed over to treatment center pt is trying to get in, [facility]- PLEASE FAX THIS INFO [phone] ADMISSIONS NUMBER [phone]  Pt is not allowed ex wife, ex wife says He's verbally abrusive and is not on lease cant come to her place.    Phone number to reach patient:  Home number on file [phone] (home)    Best Time:  ANY     Can we leave a detailed message on this number?  YES    Travel screening: Negative

## 2023-04-13 NOTE — PLAN OF CARE
Goal Outcome Evaluation:       Pt a wake x1 briefly otherwise appeared to be a sleep at all other safety checks.  Pt slept 6.5 hours.  His MSSA score was a 4.

## 2023-04-13 NOTE — PLAN OF CARE
Goal Outcome Evaluation:    Plan of Care Reviewed With: patient        Patient calm and cooperative, visible in milieu.  Out of detox for alcohol, COW 4 and 4 this shift.  Patient on soboxone maintenance.  Discharge was canceled today may be discharged to treatment tomorrow.  Good appetite, medication compliant.  Patient currently in room, sleeping; no aggressive behavior noted.  Will continue to monitor closely.

## 2023-04-13 NOTE — PLAN OF CARE
Problem: Alcohol Withdrawal  Goal: Alcohol Withdrawal Symptom Control  Outcome: Progressing   Goal Outcome Evaluation:    Plan of Care Reviewed With: patient      The patient remained isolated in his room all evening, sleeping intermittently. His mood was calm, and his affect was flat and blunted. He denied SI, AVHs, depression, anxiety, pain, and all other mental health SxS. His COWS and  MSSA scores were 2,2, and 4, 4. Pt states eating, sleeping, and using the bathroom are okay. He received his schedule of Suboxone. VSS was WNL, and he had no other medications.

## 2023-04-13 NOTE — PROGRESS NOTES
"[facility], [place]   Psychiatric Progress Note        Interim History:   The patient's care was discussed with the treatment team during the daily team meeting and/or staff's chart notes were reviewed.  Staff report patient will be out of detox today. Nova says that patient cannot stay with her and that he told her that he was \"wandering the streets\" so she would pick him up.     The patient reports that he is doing well. Mood is good. Denies SI. Says that he is sleeping and eating well. Says that the plan is for him to discharge to family and then enter CD treatment. [facility] but would be able to get Suboxone refilled there in the next two weeks.     Addendum: care coordinator spoke with patient about staying another day and getting CD assessment. Patient agreeable.          Medications:       buprenorphine-naloxone  1 tablet Sublingual BID     folic acid  1 mg Oral Daily     multivitamin w/minerals  1 tablet Oral Daily     nicotine  1 patch Transdermal Daily     nicotine   Transdermal Q8H     thiamine  100 mg Oral Daily          Allergies:   No Known Allergies       Labs:   No results found for this or any previous visit (from the past 24 hour(s)).       Psychiatric Examination:     /73   Pulse 94   Temp 97.4  F (36.3  C) (Temporal)   Resp 16   Ht 1.727 m (5' 8\")   Wt 49.9 kg (110 lb)   SpO2 99%   BMI 16.73 kg/m    Weight is 110 lbs 0 oz  Body mass index is 16.73 kg/m .  Orthostatic Vitals     None            Appearance: awake, alert and adequately groomed  Attitude:  cooperative  Eye Contact:  good  Mood:  good  Affect:  mood congruent  Speech:  clear, coherent  Psychomotor Behavior:  no evidence of tardive dyskinesia, dystonia, or tics  Thought Process:  goal oriented  Associations:  no loose associations  Thought Content:  no evidence of suicidal ideation or homicidal ideation and no evidence of psychotic " thought  Insight:  fair  Judgement:  fair  Oriented to:  time, person, and place  Attention Span and Concentration:  intact  Recent and Remote Memory:  fair         Precautions:     Behavioral Orders   Procedures     Code 1 - Restrict to Unit     Fall precautions     Routine Programming     As clinically indicated     Seizure precautions     Status 15     Every 15 minutes.     Withdrawal precautions          Diagnoses:     Alcohol dependence with intoxication with complication (H)  History of opiate use disorder, on Suboxone maintenance  Nicotine dependence with current use   ROOPA          Plan:      1) Continue MSSA with Valium.   2) Continue Suboxone.   3) Nicotine replacement available.   4) Will restart Campral after detox is completed.   5) Patient open to CD treatment. Likely to get assessment today.   6) Working on safe disposition to transition to CD treatment.         Disposition Plan   Reason for ongoing admission: requires detoxification from substance that poses a risk of bodily harm during withdrawal period  Discharge location: Chemical dependency treatment facility  Discharge Medications: ordered.  Follow-up Appointments: not scheduled  Legal Status: voluntary    Entered by: Hiren Garza MD on April 13, 2023 at 1:40 PM

## 2023-04-13 NOTE — PROGRESS NOTES
Met with the patient and signed ROBBIE to [facility], [facility], and [facility].    This writer will follow up with the referrals to the following placements in the morning    [facility] [phone]  [facility] [phone]  [facility] [phone]

## 2023-04-14 ENCOUNTER — OFFICE VISIT (OUTPATIENT)
Dept: BEHAVIORAL HEALTH | Facility: CLINIC | Age: 53
End: 2023-04-14
Payer: COMMERCIAL

## 2023-04-14 DIAGNOSIS — F11.20 OPIOID USE DISORDER, SEVERE, DEPENDENCE (H): Primary | ICD-10-CM

## 2023-04-14 PROCEDURE — 128N000004 HC R&B CD ADULT

## 2023-04-14 PROCEDURE — H2035 A/D TX PROGRAM, PER HOUR: HCPCS | Mod: HQ

## 2023-04-14 PROCEDURE — H0038 SELF-HELP/PEER SVC PER 15MIN: HCPCS | Mod: U5

## 2023-04-14 PROCEDURE — 250N000013 HC RX MED GY IP 250 OP 250 PS 637: Performed by: PSYCHIATRY & NEUROLOGY

## 2023-04-14 PROCEDURE — 250N000013 HC RX MED GY IP 250 OP 250 PS 637: Performed by: FAMILY MEDICINE

## 2023-04-14 PROCEDURE — 99233 SBSQ HOSP IP/OBS HIGH 50: CPT | Performed by: PSYCHIATRY & NEUROLOGY

## 2023-04-14 RX ORDER — GABAPENTIN 100 MG/1
100 CAPSULE ORAL 3 TIMES DAILY PRN
Status: DISCONTINUED | OUTPATIENT
Start: 2023-04-14 | End: 2023-04-17 | Stop reason: HOSPADM

## 2023-04-14 RX ADMIN — BUPRENORPHINE AND NALOXONE 1 TABLET: 2; .5 TABLET SUBLINGUAL at 19:36

## 2023-04-14 RX ADMIN — NICOTINE POLACRILEX 2 MG: 2 LOZENGE ORAL at 19:12

## 2023-04-14 RX ADMIN — NICOTINE POLACRILEX 2 MG: 2 LOZENGE ORAL at 15:19

## 2023-04-14 RX ADMIN — NICOTINE POLACRILEX 2 MG: 2 LOZENGE ORAL at 16:29

## 2023-04-14 RX ADMIN — ACETAMINOPHEN 650 MG: 325 TABLET, FILM COATED ORAL at 06:05

## 2023-04-14 RX ADMIN — NICOTINE 1 PATCH: 21 PATCH, EXTENDED RELEASE TRANSDERMAL at 08:45

## 2023-04-14 RX ADMIN — BUPRENORPHINE AND NALOXONE 1 TABLET: 2; .5 TABLET SUBLINGUAL at 08:46

## 2023-04-14 RX ADMIN — FOLIC ACID 1 MG: 1 TABLET ORAL at 08:46

## 2023-04-14 RX ADMIN — THIAMINE HCL TAB 100 MG 100 MG: 100 TAB at 08:46

## 2023-04-14 RX ADMIN — MULTIPLE VITAMINS W/ MINERALS TAB 1 TABLET: TAB at 08:46

## 2023-04-14 ASSESSMENT — ACTIVITIES OF DAILY LIVING (ADL)
ADLS_ACUITY_SCORE: 28
ADLS_ACUITY_SCORE: 28
LAUNDRY: WITH SUPERVISION
HYGIENE/GROOMING: INDEPENDENT
ADLS_ACUITY_SCORE: 28
ORAL_HYGIENE: INDEPENDENT
LAUNDRY: WITH SUPERVISION
ADLS_ACUITY_SCORE: 28
DRESS: INDEPENDENT
ORAL_HYGIENE: INDEPENDENT
ADLS_ACUITY_SCORE: 28
HYGIENE/GROOMING: INDEPENDENT
DRESS: INDEPENDENT

## 2023-04-14 NOTE — PROGRESS NOTES
Pt.scored 4 on MSSA overnight. Slept through the night. Complained of pain. Got 650mg tylenol. No safety or behavioral concerns observed or reported. Will continue to monitor.

## 2023-04-14 NOTE — PROGRESS NOTES
Patient completed the interview with Josh and is set up for [place] [facility] [place] [facility]. [facility] will provide transportation and if he needs anything, his ex-fiance stated she will bring anything he needs to him in treatment. Therefore, there is no need for him to go home first.     [address] [address]    [phone]

## 2023-04-14 NOTE — PROGRESS NOTES
"[facility], [place]   Psychiatric Progress Note        Interim History:   The patient's care was discussed with the treatment team during the daily team meeting and/or staff's chart notes were reviewed.  Staff report patient is out of detox. Thought he would discharge yesterday.     The patient reports that he is doing well. Mood is good. Denies SI. Says that he is sleeping and eating well. Says that he will discharge to CD treatment today. Discussed that he may not have a bed until Monday. Says that he will discuss this with his fiance. Oriented to person and place. Knows the day of the week but thought the month was March.          Medications:       buprenorphine-naloxone  1 tablet Sublingual BID     folic acid  1 mg Oral Daily     multivitamin w/minerals  1 tablet Oral Daily     nicotine  1 patch Transdermal Daily     nicotine   Transdermal Q8H     thiamine  100 mg Oral Daily          Allergies:   No Known Allergies       Labs:   No results found for this or any previous visit (from the past 24 hour(s)).       Psychiatric Examination:     /69   Pulse 82   Temp 97.3  F (36.3  C) (Temporal)   Resp 16   Ht 1.727 m (5' 8\")   Wt 49.9 kg (110 lb)   SpO2 99%   BMI 16.73 kg/m    Weight is 110 lbs 0 oz  Body mass index is 16.73 kg/m .  Orthostatic Vitals     None            Appearance: awake, alert and adequately groomed  Attitude:  cooperative  Eye Contact:  good  Mood:  good  Affect:  mood congruent  Speech:  clear, coherent  Psychomotor Behavior:  no evidence of tardive dyskinesia, dystonia, or tics  Thought Process:  goal oriented  Associations:  no loose associations  Thought Content:  no evidence of suicidal ideation or homicidal ideation and no evidence of psychotic thought  Insight:  fair  Judgement:  fair  Oriented to:  person and place  Attention Span and Concentration:  intact  Recent and Remote Memory:  fair         Precautions:     Behavioral Orders   Procedures     " Code 1 - Restrict to Unit     Fall precautions     Routine Programming     As clinically indicated     Seizure precautions     Status 15     Every 15 minutes.     Withdrawal precautions          Diagnoses:     Alcohol dependence with intoxication with complication (H)  History of opiate use disorder, on Suboxone maintenance  Nicotine dependence with current use   ROOPA          Plan:      1) Patient detoxified with MSSA with Valium.   2) Continue Suboxone.   3) Nicotine replacement available.   4) Will restart Campral at discharge.    5) Patient open to CD treatment. Had assessment.   6) Working on safe disposition to transition to CD treatment early next week.         Disposition Plan   Reason for ongoing admission: requires detoxification from substance that poses a risk of bodily harm during withdrawal period  Discharge location: Chemical dependency treatment facility  Discharge Medications: ordered.  Follow-up Appointments: not scheduled  Legal Status: voluntary    Entered by: Hiren Garza MD on April 14, 2023 at 12:07 PM

## 2023-04-14 NOTE — PLAN OF CARE
"Pt continues in out of detox status for alcohol withdrawal per unit protocol. Pt scored COWS 2, 3. Pt received scheduled suboxone dose this shift.   Pt denied anxiety and depression. Pt denied SI and SIB. Pt denied pain. Pt independent with steady gait.   Upon assessment, Pt was alert and oriented x 4 this shift - Pt knew year, month, and day as well.   Pt was asking when he could call a taxi to take him to [facility] because he was \"ready to go\". Writer reminded Pt it would be in his best interest to wait for discharge plans to be finalized and for an admission date which would most likely be early next week per shift change report. Pt agreed with writer. Writer encouraged Pt to continue to discuss discharge and treatment with the provider and case management.   Pt presents as pleasant, cooperative, calm. Pt visible in the milieu - attending group, watching TV, withdrawn to self.   Pt reports good appetite. Pt observed to eat 50% of dinner.   Pt compliant with scheduled medications. Pt received PRN nicotine lozenge 2 mg 2 times this shift.   VSS throughout the shift. Latest VS: Blood pressure 104/67, pulse 72, temperature 97.5  F (36.4  C), temperature source Temporal, resp. rate 16, height 1.727 m (5' 8\"), weight 49.9 kg (110 lb), SpO2 98 %.  Precautions: seizure, fall    Problem: Adult Behavioral Health Plan of Care  Goal: Plan of Care Review  Outcome: Progressing  Flowsheets (Taken 4/14/2023 8684)  Patient Agreement with Plan of Care: agrees     Problem: Adult Behavioral Health Plan of Care  Goal: Optimized Coping Skills in Response to Life Stressors  Outcome: Progressing     Problem: Adult Behavioral Health Plan of Care  Goal: Develops/Participates in Therapeutic [place] to Support Successful Transition  Outcome: Progressing     Problem: Suicidal Behavior  Goal: Suicidal Behavior is Absent or Managed  Outcome: Progressing     "

## 2023-04-14 NOTE — PROGRESS NOTES
Followed up with the patient referrals from yesterday.     [facility] - stated they would call back once admissions was caught up. Unsure if they even have a bed available.    [facility] - Needed some more paperwork faxed over and they will call us back    [facility] - Patient is currently on the phone conducting a phone interview.

## 2023-04-14 NOTE — PLAN OF CARE
"Goal Outcome Evaluation:    Plan of Care Reviewed With: patient      Patient is isolative/withdrawn to room this AM. Patient is ambulating independently, balanced and steady. Patient is alert and oriented x 2 to situation and place, disoriented x 2 by date and time. Patient states he thinks it is March. Patient is not attending/participating in unit programming. Pt is minimally social with peers. Affect is blunted/flat, mood is calm. Patient denies anxiety and depression this am. Patient denies SI/SIB/HI. Pt denies auditory/visual hallucinations. Patient verbally contracts for safety on the unit. Patient is tolerating medications well, denies any current side effects.     Pt's COWS = 2 upon first morning withdrawal assessment, patient remains out of detox for etoh withdrawal monitoring status. Patient reports a good appetite, and good sleep. Patient is requesting discharge today and states he is unwilling to stay until a bed is available. Patient stated, \"I have lot's of good supportive people in my life.\" This RN encouraged patient to speak with the doctor regarding this.  Rest, fluids, and food encouraged. Status 15 checks remain. Patient denies any unmet needs at this time.     Blood pressure 101/69, pulse 82, temperature 97.3  F (36.3  C), temperature source Temporal, resp. rate 16, height 1.727 m (5' 8\"), weight 49.9 kg (110 lb), SpO2 99 %.     Update:   Patient reported increasing anxiety this afternoon. Patient requesting a PRN for this. Dr. Kayla MD, paged and notified.   "

## 2023-04-14 NOTE — PROGRESS NOTES
[facility]    Peer  met with Justus Cope in [facility] to provide peer support services. Pt appeared alert, oriented and open to feedback during our discussion.     PRC and pt have an established relationship from his visits with [facility] providers for ongoing suboxone assisted therapy.  During these visits, PRC and pt have addressed his minimalization of alcohol consumption.    Pt has completed SUA and was ef forting recommendations for admission into an outpatient program which has yet to occur.   PRC and pt also discussed his self-admission into [facility] during most recent [facility] visit.    PRC and pt discussed events leading to admission into Detox unit.  Pt reports behavioral issues and disagreements at home with his significant other often lead to his turning to alcohol consumption to escape and numb their discord. Pt reports disagreements with significant other involve her substance use and disregard towards responsibilities in their home.   Pt reports having enough of the discord and made the decision to step twoards a healthier life for himself by seeking admission into detox.  Pt reports having a bed waiting for him at [facility] in [place] tomorrow morning (04/15/23) for 30 day inpatient treatment program.      Pt reports being discharged from [facility] detox today and will be at home overnight before admission into [facility] tomorrow morning.  PRC and pt discussed having a plan for zero substance use during his time spent at home. PRC encouraged pt have a safe support person to contact for assistance in the event things escalate between significant other and himself this evening.  Pt reports a mother in law assisted him into Detox and is available as needed this evening.  Pt report plans to take an Uber to [facility] tomorrow morning.     PRC commends pt on the decision to address  his alcohol substance use issues. PRC and pt discussed how denial is a strong feeling, and reaching a place of surrender and acceptance is a breakthrough moment in our addiction.  PRC encouraged pt to take the same focus utilized in addressing his opioid use disorder with suboxone assisted therapy and apply it towards current alochol use issues.     Pt reports plans to complete [facility] program and move into a new living situation apart from significant other.  PRC and pt discussed establishing firm and healthy boundaries with ppl, places and things as an important elements of self care and successful steps in recovery.  Pt mentions the couple share a dog which he hopes to have visitation rights with on a regular basis.   PRC encouraged pt attend recovery meetings and work with a sponsor for guidance, mentoring and accountability.  PRC encouraged attendance at recovery meetings as a great tool in meeting like-minded people with common goals of sobriety.     PRC commends pt on the choice being made for his sobriety and overall wellness.  PRC welcomes contact for recovery based support and resources. PRC and pt agree to speak again during an upcoming  visit.     Pt expressed concerns regarding status of suboxone prescription refill prior to discharge from detox until today and admission into [facility] tomorrow.  PRC advised pt he would speak to provider about the concern.    *PRC spoke with [facility] provider who is addressing the situation prior to pt discharge from [facility] Detox unit today.         Service Type:     Individual     Session Start Time:     10:00 am                    Session End Time:        10:30 am    Session Length:         30 minutes    Patient Goal:   To utilize suboxone assisted treatment for sobriety and long term recovery.     Goal Progress:   Ongoing.    Key Risk Factors to Recovery:   PRC encourages being aware of risk factors that can lead to re-use which include  avoiding isolation, avoiding triggers and managing cravings in a healthy manner. being open and willing to acceptance and change on a daily basis.  PRC encourages pt to establish a sober network calling tree to reach out to when needed.  Continue to practice honesty with ourselves and trusted support person(s).   PRC encourages regular attendance at recovery based meetings as well as finding a sponsor for mentoring and accountability.   PRC encourages consideration of other services such as counseling for mental health issues which can correlate with our substance use.      Support Needs:   Ongoing care, support and resources for opioid substance use disorder.     Follow up:   [facility] has provided pt with his contact information for support and resource needs.    PRC and pt agree to meet during an upcoming  visit.       [facility]  [address] [place] [address], [address]   [place], [place], [address]  Clinic Phone: [phone]  Clinic Fax: [phone]  Peer  phone: [phone]    Open Monday - Friday  9:00am-4:00pm  Walk in hours: 9am-3pm      Shashank Vásquez  April 14, 2023  10:39 AM    [facility] provides clinical oversite and supervision of care.

## 2023-04-15 PROCEDURE — 250N000013 HC RX MED GY IP 250 OP 250 PS 637: Performed by: PSYCHIATRY & NEUROLOGY

## 2023-04-15 PROCEDURE — 128N000004 HC R&B CD ADULT

## 2023-04-15 PROCEDURE — H2035 A/D TX PROGRAM, PER HOUR: HCPCS | Mod: HQ

## 2023-04-15 PROCEDURE — 250N000013 HC RX MED GY IP 250 OP 250 PS 637: Performed by: FAMILY MEDICINE

## 2023-04-15 PROCEDURE — G0177 OPPS/PHP; TRAIN & EDUC SERV: HCPCS

## 2023-04-15 RX ADMIN — NICOTINE 1 PATCH: 21 PATCH, EXTENDED RELEASE TRANSDERMAL at 09:30

## 2023-04-15 RX ADMIN — FOLIC ACID 1 MG: 1 TABLET ORAL at 08:43

## 2023-04-15 RX ADMIN — MULTIPLE VITAMINS W/ MINERALS TAB 1 TABLET: TAB at 08:43

## 2023-04-15 RX ADMIN — NICOTINE POLACRILEX 2 MG: 2 LOZENGE ORAL at 21:03

## 2023-04-15 RX ADMIN — NICOTINE POLACRILEX 2 MG: 2 LOZENGE ORAL at 16:24

## 2023-04-15 RX ADMIN — NICOTINE POLACRILEX 2 MG: 2 LOZENGE ORAL at 08:45

## 2023-04-15 RX ADMIN — THIAMINE HCL TAB 100 MG 100 MG: 100 TAB at 08:43

## 2023-04-15 RX ADMIN — BUPRENORPHINE AND NALOXONE 1 TABLET: 2; .5 TABLET SUBLINGUAL at 20:19

## 2023-04-15 RX ADMIN — BUPRENORPHINE AND NALOXONE 1 TABLET: 2; .5 TABLET SUBLINGUAL at 08:43

## 2023-04-15 ASSESSMENT — ACTIVITIES OF DAILY LIVING (ADL)
LAUNDRY: WITH SUPERVISION
ADLS_ACUITY_SCORE: 28
DRESS: INDEPENDENT
ADLS_ACUITY_SCORE: 28
ORAL_HYGIENE: INDEPENDENT
LAUNDRY: WITH SUPERVISION
HYGIENE/GROOMING: INDEPENDENT
ADLS_ACUITY_SCORE: 28
ADLS_ACUITY_SCORE: 28
DRESS: INDEPENDENT
ADLS_ACUITY_SCORE: 28
HYGIENE/GROOMING: INDEPENDENT
ADLS_ACUITY_SCORE: 28
ORAL_HYGIENE: INDEPENDENT

## 2023-04-15 NOTE — PROGRESS NOTES
04/14/23 2300   Group Therapy Session   Group Attendance attended group session   Time Session Began 1645   Time Session Ended 1730   Total Time (minutes) 45   Total # Attendees 8   Group Type psychotherapeutic   Group Topic Covered disease of addiction/choices in recovery   Group Session Detail process/ self compassion   Patient Response/Contribution cooperative with task

## 2023-04-15 NOTE — PROGRESS NOTES
Pt.appeared asleep and comfortable during 15 minutes safety checks. Breathing was quiet and calm. No distress or discomfort reported or observed. Will continue to monitor.

## 2023-04-15 NOTE — PROGRESS NOTES
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 He spoke abut how important his dog is to him, he is looking forward to seeing her on his way to treatment.

## 2023-04-15 NOTE — PLAN OF CARE
"Pt continues in out of detox status per unit protocol for alcohol withdrawal. Pt scored COWS 1, 2. Pt received scheduled suboxone dose this shift.   Pt denied anxiety and depression. Pt denied SI and SIB. Pt denied pain. Pt independent with steady gait.   Pt alert and oriented x 4 this shift.   Pt presents as pleasant, cooperative, calm. Pt visible in the [employer] - card games, watching TV, attending group.   Pt reports good appetite. Pt denies nausea. Pt reports he ate 100% of dinner.  Pt compliant with scheduled medications. Pt received PRN nicotine lozenge 2 mg 2 times this shift.   VSS throughout the shift. Latest VS: Blood pressure 111/80, pulse 95, temperature 97.3  F (36.3  C), temperature source Temporal, resp. rate 16, height 1.727 m (5' 8\"), weight 49.9 kg (110 lb), SpO2 100 %.  Precautions: seizure, fall     Problem: Plan of Care - These are the overarching goals to be used throughout the patient stay.    Goal: Optimal Comfort and Wellbeing  Outcome: Progressing     Problem: Plan of Care - These are the overarching goals to be used throughout the patient stay.    Goal: Readiness for Transition of Care  Outcome: Progressing     Problem: Adult Behavioral Health Plan of Care  Goal: Plan of Care Review  Outcome: Progressing  Flowsheets (Taken 4/15/2023 1634)  Patient Agreement with Plan of Care: agrees     Problem: Adult Behavioral Health Plan of Care  Goal: Optimized Coping Skills in Response to Life Stressors  Outcome: Progressing     Problem: Adult Behavioral Health Plan of Care  Goal: Develops/Participates in Therapeutic [place] to Support Successful Transition  Outcome: Progressing     Problem: Alcohol Withdrawal  Goal: Optimal Neurologic Function  Outcome: Progressing     Problem: Suicidal Behavior  Goal: Suicidal Behavior is Absent or Managed  Outcome: Progressing     "

## 2023-04-15 NOTE — PLAN OF CARE
"Goal Outcome Evaluation:    Plan of Care Reviewed With: patient      Patient is up and visible in the milieu. Patient is ambulating independently, balanced and steady. Patient is alert and oriented x 4 today. Patient did ask regarding discharge and was encouraged to speak with his doctor Monday to firm up plans. Patient is not attending/participating in unit programming. Pt is minimally social with peers. Affect is blunted/flat, mood is calm. Patient denies SI/SIB/HI. Patient denies anxiety and depression. Pt denies auditory/visual hallucinations. Patient verbally contracts for safety on the unit. Patient is tolerating medications well, denies any current side effects.     Patient remains out of detox at this time for etoh withdrawal monitoring. COWS in am = 2. Patient reports a good appetite, and good sleep. Patient discharge plans pending. Rest, fluids, and food encouraged. Status 15 checks remain. Patient denies any unmet needs at this time.     Blood pressure 114/77, pulse 89, temperature 97.5  F (36.4  C), temperature source Temporal, resp. rate 16, height 1.727 m (5' 8\"), weight 49.9 kg (110 lb), SpO2 96 %.   "

## 2023-04-16 PROCEDURE — 250N000013 HC RX MED GY IP 250 OP 250 PS 637: Performed by: PSYCHIATRY & NEUROLOGY

## 2023-04-16 PROCEDURE — 250N000013 HC RX MED GY IP 250 OP 250 PS 637: Performed by: FAMILY MEDICINE

## 2023-04-16 PROCEDURE — 128N000004 HC R&B CD ADULT

## 2023-04-16 RX ADMIN — NICOTINE POLACRILEX 2 MG: 2 LOZENGE ORAL at 17:55

## 2023-04-16 RX ADMIN — NICOTINE POLACRILEX 2 MG: 2 LOZENGE ORAL at 14:30

## 2023-04-16 RX ADMIN — FOLIC ACID 1 MG: 1 TABLET ORAL at 09:00

## 2023-04-16 RX ADMIN — BUPRENORPHINE AND NALOXONE 1 TABLET: 2; .5 TABLET SUBLINGUAL at 09:00

## 2023-04-16 RX ADMIN — BUPRENORPHINE AND NALOXONE 1 TABLET: 2; .5 TABLET SUBLINGUAL at 20:19

## 2023-04-16 RX ADMIN — THIAMINE HCL TAB 100 MG 100 MG: 100 TAB at 09:00

## 2023-04-16 RX ADMIN — NICOTINE POLACRILEX 2 MG: 2 LOZENGE ORAL at 07:02

## 2023-04-16 RX ADMIN — MULTIPLE VITAMINS W/ MINERALS TAB 1 TABLET: TAB at 09:00

## 2023-04-16 RX ADMIN — NICOTINE POLACRILEX 2 MG: 2 LOZENGE ORAL at 09:00

## 2023-04-16 RX ADMIN — NICOTINE 1 PATCH: 21 PATCH, EXTENDED RELEASE TRANSDERMAL at 09:00

## 2023-04-16 RX ADMIN — NICOTINE POLACRILEX 2 MG: 2 LOZENGE ORAL at 20:19

## 2023-04-16 ASSESSMENT — ACTIVITIES OF DAILY LIVING (ADL)
DRESS: INDEPENDENT
ADLS_ACUITY_SCORE: 28
LAUNDRY: WITH SUPERVISION
ADLS_ACUITY_SCORE: 28
ORAL_HYGIENE: INDEPENDENT
HYGIENE/GROOMING: INDEPENDENT
ORAL_HYGIENE: INDEPENDENT
ADLS_ACUITY_SCORE: 28
ADLS_ACUITY_SCORE: 28
DRESS: INDEPENDENT
ADLS_ACUITY_SCORE: 28
LAUNDRY: WITH SUPERVISION
ADLS_ACUITY_SCORE: 28
ADLS_ACUITY_SCORE: 28
HYGIENE/GROOMING: INDEPENDENT
ADLS_ACUITY_SCORE: 28

## 2023-04-16 NOTE — PLAN OF CARE
Pt continues in out of detox status for alcohol withdrawal per unit protocol. Pt scored COWS 1. Pt received scheduled dose of suboxone this shift.   Pt denied anxiety and depression. Pt denied SI and SIB. Pt denied pain. Pt alert. Pt independent with steady gait.   Pt presents as pleasant, calm, cooperative. Pt visible in the milieu - engaged with peers, card games, attending group.   Pt verbalized he is hoping to discharge tomorrow with hopes of returning home to see his dog and grab his belongings before going to treatment at [facility]. Writer encouraged Pt to discuss discharge plans with the provider and  tomorrow.   Pt reports good appetite.  Pt compliant with scheduled medications. Pt received PRN nicotine lozenge 2 mg 2 times this shift.   VSS throughout the shift. Latest VS: /79 and HR 82  Precautions: seizure, fall - Pt declined to wear bracelet, writer provided education    Problem: Adult Behavioral Health Plan of Care  Goal: Plan of Care Review  Outcome: Progressing  Flowsheets (Taken 4/16/2023 1614)  Patient Agreement with Plan of Care: agrees     Problem: Adult Behavioral Health Plan of Care  Goal: Optimized Coping Skills in Response to Life Stressors  Outcome: Progressing     Problem: Adult Behavioral Health Plan of Care  Goal: Develops/Participates in Therapeutic [place] to Support Successful Transition  Outcome: Progressing     Problem: Suicidal Behavior  Goal: Suicidal Behavior is Absent or Managed  Outcome: Progressing

## 2023-04-16 NOTE — PLAN OF CARE
Occupational Therapy Group     04/16/23 1527   Group Therapy Session   Group Attendance attended group session   Time Session Began 1315   Time Session Ended 1415   Total Time (minutes) 60   Total # Attendees 5   Group Type life skill;psychoeducation   Group Topic Covered coping skills/lifestyle management;relationship;anger/conflict management   Group Session Detail General Health and Coping Skills: group discussion and education on relationships and communication.   Patient Participation Detail Pt participated in a group discussion on communication and relationships. Pt was quiet for the duration of group but appeared to be engaged and listening actively through eye contact with speakers.

## 2023-04-16 NOTE — PLAN OF CARE
"Goal Outcome Evaluation:    Plan of Care Reviewed With: patient      Patient is up and visible in the milieu. Patient is ambulating independently, balanced and steady. Patient is alert and oriented x 4. Patient is attending/participating in unit programming. Pt is social with peers. Affect is blunted/flat, mood is calm. Patient denies anxiety or depression this morning. Patient denies SI/SIB/HI. Pt denies auditory/visual hallucinations. Patient verbally contracts for safety on the unit.     This RN administered the PRN medications Nicotine Lozenge, (see MAR) at the request of the patient. Patient is tolerating medications well, denies any current side effects.     Patient remains out of detox monitoring for etoh withdrawal. Patient reports a good appetite, and good sleep. Patient is hopeful for discharge on Monday. Rest, fluids, and food encouraged. Status 15 checks remain. Patient denies any unmet needs at this time.     Blood pressure 104/69, pulse 81, temperature 97.2  F (36.2  C), temperature source Temporal, resp. rate 16, height 1.727 m (5' 8\"), weight 49.9 kg (110 lb), SpO2 100 %.   "

## 2023-04-17 VITALS
BODY MASS INDEX: 16.67 KG/M2 | WEIGHT: 110 LBS | DIASTOLIC BLOOD PRESSURE: 84 MMHG | RESPIRATION RATE: 16 BRPM | OXYGEN SATURATION: 100 % | SYSTOLIC BLOOD PRESSURE: 132 MMHG | TEMPERATURE: 97.3 F | HEART RATE: 89 BPM | HEIGHT: 68 IN

## 2023-04-17 PROCEDURE — 250N000013 HC RX MED GY IP 250 OP 250 PS 637: Performed by: FAMILY MEDICINE

## 2023-04-17 PROCEDURE — 99239 HOSP IP/OBS DSCHRG MGMT >30: CPT | Performed by: PSYCHIATRY & NEUROLOGY

## 2023-04-17 PROCEDURE — 250N000013 HC RX MED GY IP 250 OP 250 PS 637: Performed by: PSYCHIATRY & NEUROLOGY

## 2023-04-17 RX ADMIN — BUPRENORPHINE AND NALOXONE 1 TABLET: 2; .5 TABLET SUBLINGUAL at 08:23

## 2023-04-17 RX ADMIN — NICOTINE POLACRILEX 2 MG: 2 LOZENGE ORAL at 06:23

## 2023-04-17 RX ADMIN — MULTIPLE VITAMINS W/ MINERALS TAB 1 TABLET: TAB at 08:23

## 2023-04-17 RX ADMIN — THIAMINE HCL TAB 100 MG 100 MG: 100 TAB at 08:23

## 2023-04-17 RX ADMIN — FOLIC ACID 1 MG: 1 TABLET ORAL at 08:23

## 2023-04-17 RX ADMIN — NICOTINE POLACRILEX 2 MG: 2 LOZENGE ORAL at 10:14

## 2023-04-17 RX ADMIN — NICOTINE 1 PATCH: 21 PATCH, EXTENDED RELEASE TRANSDERMAL at 08:22

## 2023-04-17 ASSESSMENT — ACTIVITIES OF DAILY LIVING (ADL)
HYGIENE/GROOMING: INDEPENDENT
ADLS_ACUITY_SCORE: 28
LAUNDRY: WITH SUPERVISION
ADLS_ACUITY_SCORE: 28
ORAL_HYGIENE: INDEPENDENT
ADLS_ACUITY_SCORE: 28
DRESS: INDEPENDENT
ADLS_ACUITY_SCORE: 28

## 2023-04-17 NOTE — PROGRESS NOTES
Patient anticipated to discharge today. Patient remains out of etoh withdrawal monitoring per unit protocol. Patient is alert and oriented x 4, steady and balanced while ambulating. Patient denies SI/SIB/HI. Patient denies auditory/visual hallucinations. Patient states he is hopeful for the future.

## 2023-04-17 NOTE — PROGRESS NOTES
This writer spoke to the patient about transportation to [facility] since he would be discharging today. The patient stated that since he has to go home and gather some things, he would set up the cab himself to home and then to treatment.    When calling [facility], they informed this writer that they just filled the last open spot for today and they would call this writer if a spot an opens up. Otherwise his intake is still on for Tuesday at 10am. Regardless, the patient has transporation to set up to transport home today.

## 2023-04-17 NOTE — DISCHARGE SUMMARY
"Psychiatric Discharge Summary    Justus Cope MRN# [id number]   Age: 53 year old YOB: 1970     Date of Admission:  4/10/2023  Date of Discharge:  4/17/2023  1:15 PM  Admitting Physician:  Hiren Garza MD   Discharge Physician:  Hiren Garza MD          Event Leading to Hospitalization:   The patient is a 52yo male with a history of alcohol use disorder, anxiety and opiate dependence on Suboxone who was admitted to detoxify from alcohol. Reports that he is \"all right.\" Does feel \"shaky\". Denies nausea and was able to eat breakfast. Slept well last night. Mood is anxious but denies SI. Does feel that Campral was helpful. Hasn't been using gabapentin or clonidine recently. Is open to CD treatment.         See Admission note for additional details.          Diagnoses:     Alcohol dependence with intoxication with complication (H)  History of opiate use disorder, on Suboxone maintenance  Nicotine dependence with current use   ROOPA                Labs:        Lab Results   Component Value Date     04/11/2023    Lab Results   Component Value Date    CHLORIDE 100 04/11/2023    CHLORIDE 101 02/02/2022    Lab Results   Component Value Date    BUN 8.8 04/11/2023    BUN 8 02/02/2022      Lab Results   Component Value Date    POTASSIUM 4.1 04/11/2023    POTASSIUM 3.6 02/02/2022    Lab Results   Component Value Date    CO2 25 04/11/2023    CO2 26 02/02/2022    Lab Results   Component Value Date    CR 0.68 04/11/2023          Lab Results   Component Value Date    WBC 8.1 04/11/2023    HGB 11.5 (L) 04/11/2023    HCT 34.6 (L) 04/11/2023    MCV 96 04/11/2023     (L) 04/11/2023     Lab Results   Component Value Date    AST 58 (H) 04/11/2023    ALT 26 04/11/2023     (H) 04/11/2023    ALKPHOS 134 (H) 04/11/2023    BILITOTAL 0.5 04/11/2023     No results found for: TSH         Consults:   Consultation during this admission received from internal medicine.  No medical intervention was " indicated.           Hospital Course:   Justus Cope was admitted to [facility] with attending Hiren Garza MD  as a voluntary patient. The patient was placed under status 15 (15 minute checks) to ensure patient safety.   MSSA protocol was initiated due to the patient's history of alcohol abuse and concern for withdrawal symptoms.  CBC, BMP and utox obtained.    All outpatient medications were continued with the exception of Campral which was restarted at discharge.     Justus Cope did participate in groups and was visible in the milieu.     The patient's symptoms of withdrawal improved.     Justus Cope was released to  treatment. At the time of discharge Justus Cope was determined to not be a danger to himself or others. At the current time of discharge, the patient does not meet criteria for involuntary hospitalization. On the day of discharge, the patient reports that they do not have suicidal or homicidal ideation and would never hurt themselves or others. Steps taken to minimize risk include: assessing patient s behavior and thought process daily during hospital stay, discharging patient with adequate plan for follow up for mental and physical health and discussing safety plan of returning to the hospital should the patient ever have thoughts of harming themselves or others. Therefore, based on all available evidence including the factors cited above, the patient does not appear to be at imminent risk for self-harm, and is appropriate for outpatient level of care.           Discharge Medications:     Current Discharge Medication List      START taking these medications    Details   folic acid (FOLVITE) 1 MG tablet Take 1 tablet (1 mg) by mouth daily  Qty: 30 tablet, Refills: 1    Associated Diagnoses: Alcohol use disorder, moderate, dependence (H)      multivitamin w/minerals (THERA-VIT-M) tablet Take 1 tablet by mouth daily  Qty: 30 tablet, Refills: 1    Associated Diagnoses:  Alcohol use disorder, moderate, dependence (H)      nicotine (COMMIT) 2 MG lozenge Place 1 lozenge (2 mg) inside cheek every hour as needed for smoking cessation  Qty: 100 lozenge, Refills: 1    Associated Diagnoses: Nicotine use disorder      nicotine (NICODERM CQ) 21 MG/24HR 24 hr patch Place 1 patch onto the skin daily  Qty: 28 patch, Refills: 1    Associated Diagnoses: Nicotine use disorder      thiamine (B-1) 100 MG tablet Take 1 tablet (100 mg) by mouth daily  Qty: 30 tablet, Refills: 0    Associated Diagnoses: Alcohol use disorder, moderate, dependence (H)         CONTINUE these medications which have CHANGED    Details   acamprosate (CAMPRAL) 333 MG EC tablet Take 2 tablets (666 mg) by mouth 3 times daily  Qty: 180 tablet, Refills: 3    Associated Diagnoses: Alcohol use disorder, moderate, dependence (H)      buprenorphine-naloxone (SUBOXONE) 2-0.5 MG SUBL sublingual tablet Place 1 tablet under the tongue 2 times daily  Qty: 28 tablet, Refills: 0    Associated Diagnoses: Opioid use disorder, severe, dependence (H)      naloxone (NARCAN) 4 MG/0.1ML nasal spray Spray 1 spray (4 mg) into one nostril alternating nostrils as needed for opioid reversal every 2-3 minutes until assistance arrives  Qty: 0.2 mL, Refills: 1    Associated Diagnoses: Uncomplicated opioid dependence (H)         CONTINUE these medications which have NOT CHANGED    Details   cloNIDine (CATAPRES) 0.1 MG tablet Take 1 tablet (0.1 mg) by mouth 2 times daily as needed (anxiety, high bp)  Qty: 90 tablet, Refills: 0    Associated Diagnoses: Alcohol use disorder, moderate, dependence (H)      gabapentin (NEURONTIN) 300 MG capsule Take 1 capsule (300 mg) by mouth 2 times daily as needed for other (alcohol cravings or withdrawal symptoms)  Qty: 60 capsule, Refills: 0    Associated Diagnoses: Alcohol use disorder, moderate, dependence (H)         STOP taking these medications       DTx Garry - Subst Use Disorder (RESET) Comments:   Reason for Stopping:                     Psychiatric Examination:   Appearance:  awake, alert and adequately groomed  Attitude:  cooperative  Eye Contact:  good  Mood:  good  Affect:  mood congruent  Speech:  clear, coherent  Psychomotor Behavior:  no evidence of tardive dyskinesia, dystonia, or tics  Thought Process:  goal oriented  Associations:  no loose associations  Thought Content:  no evidence of suicidal ideation or homicidal ideation and no evidence of psychotic thought  Insight:  fair  Judgment:  fair  Oriented to:  time, person, and place  Attention Span and Concentration:  fair  Recent and Remote Memory:  poor  Language: Able to read and write  Fund of Knowledge: appropriate  Muscle Strength and Tone: normal  Gait and Station: Normal         Discharge Plan:   Continue medications as above.     Recommendation:  Enter treatment at [facility]. Intake appointment 4/18 at 10am. [facility] will call if earlier appointment opens up.     [address] [address] [phone]     Major Treatments, Procedures and Findings:  You were treated for alcohol detoxification using the [facility] protocol. You have had a chemical dependency assessment. You had labs drawn and those results were reviewed with you. Please take a copy of your lab work with you to your next primary care provider appointment.     Symptoms to Report:  If you experience more anxiety, confusion, sleeplessness, deep sadness or thoughts of suicide, notify your treatment team or notify your primary care provider. IF ANY OF THE SYMPTOMS YOU ARE EXPERIENCING ARE A MEDICAL EMERGENCY CALL 911 IMMEDIATELY.      Lifestyle Adjustment: Adjust your lifestyle to get enough sleep, relaxation, exercise and good nutrition. Continue to develop healthy coping skills to decrease stress and promote a sober living environment. Do not use mood altering substances including alcohol, illegal drugs or addictive medications other than what is currently  prescribed.      Disposition: Discharge home and then to Inpatient Treatment on 4/18 at 10am.      Facts about COVID19 at www.cdc.gov/COVID19 and www.[place].gov/covid19     Keeping hands clean is one of the most important steps we can take to avoid getting sick and spreading germs to others.  Please wash your hands frequently and lather with soap for at least 20 seconds!     Follow-up Appointment:   Suboxone Maintenance Follow-up/Primary Care - Dr. Dawn  [facility]  [facility], 6th floor  [address] [place] [address]/ [place], [place]   [phone]  Appointment Date/Time: Please make a follow-up appointment with Provider as soon as possible.     Attestation:    The patient was seen and evaluated by me. I spent more than 30 minutes on discharge day activities. Hiren Garza MD

## 2023-04-17 NOTE — PROGRESS NOTES
Spoke with the patients ex chelsey and she expressed her concerns of him drinking if the patient returns home. This writer stated that the patient has his intake set up for Tuesday and if an earlier appointment comes, he will intake then. However at this point, the patient is to be discharged today.

## 2023-04-17 NOTE — PROGRESS NOTES
Pt.continues to be out of detox for acute alcohol withdrawal monitoring. No prn medication administered overnight. No medication adverse side effects reported or observed. Breathing was calm, quiet, and spontaneous. Will continue to monitor.

## 2023-04-18 ENCOUNTER — PATIENT OUTREACH (OUTPATIENT)
Dept: CARE COORDINATION | Facility: CLINIC | Age: 53
End: 2023-04-18
Payer: COMMERCIAL

## 2023-04-18 NOTE — PROGRESS NOTES
[facility]    Background: Transitional Care Management program identified per system criteria and reviewed by [facility] team for possible outreach.    Assessment: Upon chart review, [facility] Team member will not proceed with patient outreach related to this episode of Transitional Care Management program due to reason below:    Patient admitted to CD treatment facility on 4/18    Plan: Transitional Care Management episode addressed appropriately per reason noted above.      CHELE Jacome  [facility], [facility]    *Connected Care Resource Team does NOT follow patient ongoing. Referrals are identified based on internal discharge reports and the outreach is to ensure patient has an understanding of their discharge instructions.

## 2023-05-08 ENCOUNTER — HEALTH MAINTENANCE LETTER (OUTPATIENT)
Age: 53
End: 2023-05-08

## 2023-06-02 DIAGNOSIS — F11.20 OPIOID USE DISORDER, SEVERE, DEPENDENCE (H): ICD-10-CM

## 2023-06-02 RX ORDER — BUPRENORPHINE HYDROCHLORIDE AND NALOXONE HYDROCHLORIDE DIHYDRATE 2; .5 MG/1; MG/1
1 TABLET SUBLINGUAL 2 TIMES DAILY
Qty: 14 TABLET | Refills: 0 | Status: SHIPPED | OUTPATIENT
Start: 2023-06-02 | End: 2023-06-09

## 2023-06-02 NOTE — TELEPHONE ENCOUNTER
RN notified patient of script sent to pharmacy of choice. Patient was grateful and plans to follow-up in clinic 6/6/2023.     Deann Flores RN on 6/2/2023 at 3:38 PM

## 2023-06-02 NOTE — TELEPHONE ENCOUNTER
Reason for call:  Other   Patient called regarding (reason for call): prescription  Additional comments: pt is in treatment called [facility] in [facility] and he requesting refill sbxn. Pt was recently in 30 day residential and provider at that facilty was refilling pt . Not that he is in new treatment he needs  to help with refills. PLEASE SEND REFILL TO [facility] : [address], [facility], [id number] [phone]    Phone number to reach patient:  PLEASE CALL [phone]    Best Time:  ANY     Can we leave a detailed message on this number?  YES    Travel screening: Negative

## 2023-06-02 NOTE — TELEPHONE ENCOUNTER
1. Opioid use disorder, severe, dependence (H)    - buprenorphine-naloxone (SUBOXONE) 2-0.5 MG SUBL sublingual tablet; Place 1 tablet under the tongue 2 times daily  Dispense: 14 tablet; Refill: 0

## 2023-06-02 NOTE — TELEPHONE ENCOUNTER
RN reviewed call from patient. Returned call.     Patient reports the followin. He completed a 30-day program with [facility] in [place]. Medications were supplied by the program and he was discharged with a supply.     2. He is now in outpatient treatment with lodging and took his last dose of subutex on 2023. He states he believes he can come for inperson appointments.     3. He reports the treatment program uses the [facility] - [address] [phone] and they deliver medications.     RN requested he make a follow-up appointment in the Recovery Clinic which he scheduled for 2023.     Routing a bridge request. RN will contact patient once reviewed by provider.     Date of Last Office Visit: 3/23/2023  Date of Next Office Visit: 2023  No shows since last visit: (1) 2023  Cancellations since last visit: 0    Medication requested: buprenorphine-naloxone (SUBOXONE) 2-0.5 MG SUBL sublingual tablet Date last ordered: 23 Qty: 28 Refills: 0     Review of [place] ?: Yes  Medication last filled date: 2023 Qty filled: #30 (15 day supply)  Medication last filled date: 2023 Qty filled: #30 (15 day supply)  Other controlled substance on [place] ?: No    Lapse in medication adherence greater than 5 days?: No    Medication refill request verified as identical to current order?: No. Open quant for a bridge.  Result of Last DAM, VPA, Li+ Level, CBC, or Carbamazepine Level (at or since last visit): 4/10/2023 ED        Last visit treatment plan:   ASSESSMENT/PLAN                                                       1. Opioid use disorder, severe, dependence (H)  -Controlled despite ongoing alcohol use. Continue suboxone 4 mg TDD.  - buprenorphine-naloxone (SUBOXONE) 2-0.5 MG SUBL sublingual tablet; Place 1 tablet under the tongue 2 times daily  Dispense: 5 tablet; Refill: 0     2. Alcohol use disorder, moderate, dependence (H)  Needs improvement. Reports drinking 2-3 beers, 2-3 times  "weekly, denies daily use. He is planning on going to inpatient detox. He has not started acamprosate yet, plans to start after detox.   -Resources for detox provided by [facility], also informed him of inpatient detox unit here at [place].   -Strongly encouraged him to reconsider IOP, and referral placed to update his assessment. Phone number provide for him to schedule.  -Encouraged AA attendance.   - Adult Mental Health  Referral; Future  - DTx Garry - Subst Use Disorder (RESET); Use as directed for 90 days.  Dispense: 1 each; Refill: 3  - Phosphatidylethanol (PEth), Whole Blood; Future     3. Encounter for monitoring opioid maintenance therapy  - Drugs of Abuse Screen Urine (POC CUPS) POCT; Standing  - Drugs of Abuse Screen Urine (POC CUPS) POCT  - Benzodiazepine qualitative urine; Future     4. History of benzodiazepine use  Denies use despite positive POC today. States he is \"always positive\". Urine sent for confirmation.   Encouraged him to follow through with plans for inpatient detox, and justine eval for CD treatment.     []Medication refilled per  Medication Refill in Ambulatory Care  policy.  [x]Medication unable to be refilled by RN due to criteria not met as indicated below:    []Eligibility - not seen in the last year   []Supervision - no future appointment   []Compliance - no shows, cancellations or lapse in therapy   []Verification - order discrepancy   [x]Controlled medication   []Medication not included in policy   []90-day supply request   []Other        "

## 2023-06-06 ENCOUNTER — TELEPHONE (OUTPATIENT)
Dept: BEHAVIORAL HEALTH | Facility: CLINIC | Age: 53
End: 2023-06-06

## 2023-06-06 NOTE — TELEPHONE ENCOUNTER
SCARLETT placed a telephone recovery support call to pt given a recent no show for scheduled appointment with provider in Recovery Clinic.    SCARLETT spoke with pt who states he is doing well.    Pt states following discharge from [facility] / 3A he went to [facility] in [place] which he recently completed.  Pt is now at [facility] in [place].  Pt reports treatment has been beneficial to him.   Pt states missing today's scheduled  appoimtent due to ride set for him was used for another resident who was discharged from the problem.  SCARLETT provided pt with the walk in hours of Monday to Friday 9 am - 3 pm.      Shashank Vásquez  Peer   Recovery Clinic   Direct Dial: [phone]

## 2023-06-09 DIAGNOSIS — F11.20 OPIOID USE DISORDER, SEVERE, DEPENDENCE (H): ICD-10-CM

## 2023-06-09 RX ORDER — BUPRENORPHINE HYDROCHLORIDE AND NALOXONE HYDROCHLORIDE DIHYDRATE 2; .5 MG/1; MG/1
1 TABLET SUBLINGUAL 2 TIMES DAILY
Qty: 8 TABLET | Refills: 0 | Status: SHIPPED | OUTPATIENT
Start: 2023-06-09 | End: 2023-06-13

## 2023-06-09 NOTE — TELEPHONE ENCOUNTER
Reason for call:  Medication   If this is a refill request, has the caller requested the refill from the pharmacy already? No  Will the patient be using a [facility]? No  Name of the pharmacy and phone number for the current request: [facility] [place], [place] - [address]  [phone]    Name of the medication requested: buprenorphine-naloxone (SUBOXONE) 2-0.5 MG SUBL sublingual tablet    Other request: Patient state he needs a refilled. He only got 1 week of his prescription and it should be 2 weeks worth. Would like a phone call.    Phone number to reach patient:  Cell number on file:    Telephone Information:   Mobile [phone]       Best Time:  ANY    Can we leave a detailed message on this number?  Not Applicable    Travel screening: Not Applicable

## 2023-06-09 NOTE — TELEPHONE ENCOUNTER
1. Opioid use disorder, severe, dependence (H)    - buprenorphine-naloxone (SUBOXONE) 2-0.5 MG SUBL sublingual tablet; Place 1 tablet under the tongue 2 times daily  Dispense: 8 tablet; Refill: 0

## 2023-06-09 NOTE — TELEPHONE ENCOUNTER
RN reviewed chart and [facility] note regarding missed appointment on 6/6/2023 in the Recovery Clinic. RN called patient back. He states he usually gets a 2-week script but was given enough medication to get to his 6/6/2023. RN asked if patient can present to the Recovery Clinic for a walk-in on Monday 6/12/2023 and he agreed and that staff can give him a ride from [facility].     Routing bridge request to provider.    Date of Last Office Visit: 3/23/2023  Date of Next Office Visit: None scheduled. Plans to walk-in Monday 6/12/2023.  No shows since last visit: (2) 4/6/2023, 6/6/2023  Cancellations since last visit: 0    Medication requested: buprenorphine-naloxone (SUBOXONE) 2-0.5 MG SUBL sublingual tablet Date last ordered: 6/2/2023 Qty: 14 Refills: 0     [place] of [place] ?: Yes  Medication last filled date: 6/2/2023 Qty filled: 14 (1 week)  Other [place] substance [place] ?: No    Lapse in medication adherence greater than 5 days?: No    Medication refill request verified as identical to current order?: No. Open for a bridge.  Result of Last DAM, VPA, Li+ Level, CBC, or Carbamazepine Level (at or since last visit): N/A     Last visit treatment plan:   ASSESSMENT/PLAN                                                       1. Opioid use disorder, severe, dependence (H)  -Controlled despite ongoing alcohol use. Continue suboxone 4 mg TDD.  - buprenorphine-naloxone (SUBOXONE) 2-0.5 MG SUBL sublingual tablet; Place 1 tablet under the tongue 2 times daily  Dispense: 5 tablet; Refill: 0     2. Alcohol use disorder, moderate, dependence (H)  Needs improvement. Reports drinking 2-3 beers, 2-3 times weekly, denies daily use. He is planning on going to inpatient detox. He has not started acamprosate yet, plans to start after detox.   -Resources for detox provided by [facility], also informed him of inpatient detox unit here at [place].   -Strongly encouraged him to reconsider IOP, and referral placed  "to update his assessment. Phone number provide for him to schedule.  -Encouraged AA attendance.   - Adult Mental Health  Referral; Future  - DTx Garry - Subst Use Disorder (RESET); Use as directed for 90 days.  Dispense: 1 each; Refill: 3  - Phosphatidylethanol (PEth), Whole Blood; Future     3. Encounter for monitoring opioid maintenance therapy  - Drugs of Abuse Screen Urine (POC CUPS) POCT; Standing  - Drugs of Abuse Screen Urine (POC CUPS) POCT  - Benzodiazepine qualitative urine; Future     4. History of benzodiazepine use  Denies use despite positive POC today. States he is \"always positive\". Urine sent for confirmation.   Encouraged him to follow through with plans for inpatient detox, and justine eval for CD treatment.     []Medication refilled per  Medication Refill in Ambulatory Care  policy.  [x]Medication unable to be refilled by RN due to criteria not met as indicated below:    []Eligibility - not seen in the last year   []Supervision - no future appointment   []Compliance - no shows, cancellations or lapse in therapy   []Verification - order discrepancy   [x]Controlled medication   []Medication not included in policy   []90-day supply request   []Other      "

## 2023-06-13 ENCOUNTER — OFFICE VISIT (OUTPATIENT)
Dept: BEHAVIORAL HEALTH | Facility: CLINIC | Age: 53
End: 2023-06-13
Payer: COMMERCIAL

## 2023-06-13 ENCOUNTER — APPOINTMENT (OUTPATIENT)
Dept: BEHAVIORAL HEALTH | Facility: CLINIC | Age: 53
End: 2023-06-13
Payer: COMMERCIAL

## 2023-06-13 VITALS — HEART RATE: 94 BPM | SYSTOLIC BLOOD PRESSURE: 136 MMHG | DIASTOLIC BLOOD PRESSURE: 77 MMHG | OXYGEN SATURATION: 99 %

## 2023-06-13 DIAGNOSIS — F11.21 OPIOID USE DISORDER, SEVERE, IN SUSTAINED REMISSION (H): Primary | ICD-10-CM

## 2023-06-13 DIAGNOSIS — Z51.81 ENCOUNTER FOR MONITORING OPIOID MAINTENANCE THERAPY: ICD-10-CM

## 2023-06-13 DIAGNOSIS — Z79.891 ENCOUNTER FOR MONITORING OPIOID MAINTENANCE THERAPY: ICD-10-CM

## 2023-06-13 DIAGNOSIS — F10.20 ALCOHOL USE DISORDER, MODERATE, DEPENDENCE (H): ICD-10-CM

## 2023-06-13 DIAGNOSIS — F17.200 NICOTINE USE DISORDER: ICD-10-CM

## 2023-06-13 PROBLEM — F11.20 OPIOID USE DISORDER, SEVERE, DEPENDENCE (H): Status: ACTIVE | Noted: 2021-11-19

## 2023-06-13 LAB
AMPHETAMINE QUAL URINE POCT: NEGATIVE
BARBITURATE QUAL URINE POCT: NEGATIVE
BENZODIAZEPINE QUAL URINE POCT: NEGATIVE
BUPRENORPHINE QUAL URINE POCT: ABNORMAL
COCAINE QUAL URINE POCT: NEGATIVE
CREATININE QUAL URINE POCT: ABNORMAL
INTERNAL QC QUAL URINE POCT: ABNORMAL
MDMA QUAL URINE POCT: NEGATIVE
METHADONE QUAL URINE POCT: NEGATIVE
METHAMPHETAMINE QUAL URINE POCT: NEGATIVE
OPIATE QUAL URINE POCT: NEGATIVE
OXYCODONE QUAL URINE POCT: NEGATIVE
PH QUAL URINE POCT: ABNORMAL
PHENCYCLIDINE QUAL URINE POCT: NEGATIVE
POCT KIT EXPIRATION DATE: ABNORMAL
POCT KIT LOT NUMBER: ABNORMAL
SPECIFIC GRAVITY POCT: 1.02
TEMPERATURE URINE POCT: ABNORMAL
THC QUAL URINE POCT: NEGATIVE

## 2023-06-13 PROCEDURE — 99214 OFFICE O/P EST MOD 30 MIN: CPT | Performed by: NURSE PRACTITIONER

## 2023-06-13 PROCEDURE — 80305 DRUG TEST PRSMV DIR OPT OBS: CPT | Performed by: NURSE PRACTITIONER

## 2023-06-13 RX ORDER — BUPRENORPHINE HYDROCHLORIDE AND NALOXONE HYDROCHLORIDE DIHYDRATE 2; .5 MG/1; MG/1
1 TABLET SUBLINGUAL 2 TIMES DAILY
Qty: 60 TABLET | Refills: 0 | Status: SHIPPED | OUTPATIENT
Start: 2023-06-13 | End: 2023-07-31

## 2023-06-13 ASSESSMENT — PATIENT HEALTH QUESTIONNAIRE - PHQ9: SUM OF ALL RESPONSES TO PHQ QUESTIONS 1-9: 1

## 2023-06-13 NOTE — NURSING NOTE
[facility]    Recovery Clinic walk-in. Patient is currently in [facility] Outpatient CD tx at the [facility] with sober housing. Patient went to Detox, inpatient CD tx, and now outpatient tx for alcohol use disorder    Rooming information:  Approximate last use of full opioid agonist: 16-17 years ago  Taking buprenorphine? Yes   As prescribed? Yes  Number of buprenorphine films/tablets remaining currently: 0  Side effects related to buprenorphine (constipation, dry mouth, sedation?) No     Narcan currently available: Yes  Other recent substance use:    Denies  NICOTINE-Yes: cigarettes  If using nicotine, ready to quit? Yes: has NRT    Point of care urine drug screen positive for:  Lab Results   Component Value Date    BUP Screen Positive (A) 06/13/2023    BZO Negative 06/13/2023    BAR Negative 06/13/2023    SHIRA Negative 06/13/2023    MAMP Negative 06/13/2023    AMP Negative 06/13/2023    MDMA Negative 06/13/2023    MTD Negative 06/13/2023    [id number] Negative 06/13/2023    OXY Negative 06/13/2023    PCP Negative 06/13/2023    THC Negative 06/13/2023    TEMP 94 F 06/13/2023    SGPOCT 1.025 06/13/2023       *POC urine drug screen does not screen for Fentanyl        6/13/2023     3:00 PM   PHQ Assesment Total Score(s)   PHQ-9 Score 1       If PHQ-9 score of 15 or higher, has Recovery Clinic therapist or provider been notified? N/A    Any current suicidal ideation? No  If yes, has Recovery Clinic therapist or provider been notified? N/A    Primary care provider: none     Mental health provider: none    Insurance needs: active    Housing needs: sober housing    Contact information up to date? yes    3rd Party Involvement: gareth Marques RN  June 13, 2023  2:52 PM

## 2023-06-13 NOTE — PROGRESS NOTES
[facility] - Recovery Clinic Follow Up    ASSESSMENT/PLAN                                                      1. Opioid use disorder, severe, in sustained remission (H)  2. Encounter for monitoring opioid maintenance therapy  Controlled. Sustained remission. Continue Suboxone unchanged, 2 mg BID.   Confirms access to narcan   Continue IOP with lodging   UDS as expected positive only for buprenorphine   - buprenorphine-naloxone (SUBOXONE) 2-0.5 MG SUBL sublingual tablet; Place 1 tablet under the tongue 2 times daily  Dispense: 60 tablet; Refill: 0  - Drugs of Abuse Screen Urine (POC CUPS) POCT    3. Alcohol use disorder, moderate, dependence (H)  - Controlled, no alcohol use in ~ 2 months. Detox and inpatient completed, not taking Campral at this time, stopped after detox. No cravings. Continue outpatient programming with lodging at [place]. Monitor for cravings.     4. Nicotine use disorder  - smoking 1/2 pk per day, contemplative regarding cessation, does have NRT if needed.        Return in about 4 weeks (around 7/11/2023) for Follow up, with any available provider, in person in Addiction Medicine .     Patient counseling completed today:  Discussed mechanism of action, potential risks/benefits/side effects of medications and other recommendations above.         Discussed importance of avoiding isolation, building a network of supportive relationships, avoiding people/places/things associated with past use to reduce risk of relapse; including motivational interviewing regarding psychosocial treatment for addiction.     SUBJECTIVE                                                        Justus Cope is a 53 year old male with PMH neck pain, depression with anxiety, moderate alcohol use disorder, tobacco dependence, and opioid dependence  who presents to the Recovery Clinic for follow up. Pt was referred by Dr. Shimon Arciniega in Addiction medication.      First Recovery Clinic visit:  7/21/22     Brief  "history of use:   Previously following with Dr. Arciniega in Addiction medicine for ongoing management of opioid dependence and alcohol use disorder moderate. At initial visit pt reports he has continue Suboxone despite being \"out\" for 2 months. He had been breaking each tablet into multiple pieces and taking it once per day. He will be a couple days between doses. Denies recent full opioid agonist use. Reports he was taking percocet when he was living in [place]. He is currently working full time at 2 different jobs, [id number] and as a PCA. Reports he receives alcohol from his job. Reports drinking about 3 beers every other day. His goal is NOT abstinence. He has been previously positive for benzo's in the past. No prescribed this medication. He has been going to . \"I know my drinking is problematic.\" Detox ([facility]) from 4/10- 4/17, started inpatient at [facility], now outpatient.      Substance Use History :  Opioids:   Age at first use: [birth year]2010  Current use: timing of last use: 2015; substance: prescribed pain meds; route: oral and snort                 IV drug use: No   History of overdose: No  Previous treatments : No  Longest period of sobriety: currently   Medical complications related to substance use: denies  Hepatitis C: negative per pt; no recent screening found   HIV: negative per pt; no recent screening found     Other Addiction History:  Stimulants:   Past use: denies  Use in last 6 months: denies  Sedatives/hypnotics/anxiolytics:   Past use: denies  Use in last 6 months: denies - UDS positive for BZO on 7/21/22  Alcohol:   Past use: occasional   Use in last 6 months: 2-4 beers every other day. LAST USE 4/10/23   Nicotine:   Cigarettes: 1 pack daily  Vaping: denies  Chewing tobacco: denies  Cannabis:   Past use: occasionally  Use in last 6 months: denies  Hallucinogens:   Past use: not for over 25 years  Use in last 6 months: denies  Behavioral addictions:   denies     PAST PSYCHIATRIC " "HISTORY:  Diagnoses- depression with anxiety   Suicide Attempts: No   Hospitalizations: No      Social History  Housing status: with girlfriend  Employment status: Employed full time  Relationship status: Partnered  Children: 1 child, 26 YO  Legal: denies  Insurance needs: active  Contact information up to date? yes         3/23/2023     1:00 PM 4/13/2023    12:28 PM 6/13/2023     3:00 PM   PHQ   PHQ-9 Total Score 0 0 1   Q9: Thoughts of better off dead/self-harm past 2 weeks Not at all Not at all Not at all         Most recent Recovery Clinic visit 3/23/23    A/P last visit:  1. Opioid use disorder, severe, dependence (H)  -Controlled despite ongoing alcohol use. Continue suboxone 4 mg TDD.  - buprenorphine-naloxone (SUBOXONE) 2-0.5 MG SUBL sublingual tablet; Place 1 tablet under the tongue 2 times daily  Dispense: 5 tablet; Refill: 0     2. Alcohol use disorder, moderate, dependence (H)  Needs improvement. Reports drinking 2-3 beers, 2-3 times weekly, denies daily use. He is planning on going to inpatient detox. He has not started acamprosate yet, plans to start after detox.   -Resources for detox provided by [facility], also informed him of inpatient detox unit here at [facility].   -Strongly encouraged him to reconsider IOP, and referral placed to update his assessment. Phone number provide for him to schedule.  -Encouraged AA attendance.   - Adult Mental Health  Referral; Future  - DTx Garry - Subst Use Disorder (RESET); Use as directed for 90 days.  Dispense: 1 each; Refill: 3  - Phosphatidylethanol (PEth), Whole Blood; Future     3. Encounter for monitoring opioid maintenance therapy  - Drugs of Abuse Screen Urine (POC CUPS) POCT; Standing  - Drugs of Abuse Screen Urine (POC CUPS) POCT  - Benzodiazepine qualitative urine; Future     4. History of benzodiazepine use  Denies use despite positive POC today. States he is \"always positive\". Urine sent for confirmation.   Encouraged him to follow through with plans " "for inpatient detox, and justine eval for CD treatment.      Interval Hx:   - ED to hospital admission for detox. 4/10 - 4/17   - [facility]. Intake appointment 4/18 at 10 am  - outpatient at [facility]     06/13/23 visit:  - here today for follow up, completed inpatient at [facility] 30 days   - now in outpatient at [facility] in [place], this is 3 month program. Has lodging. All medications are managed.   - had continued suboxone while in treatment   - now that he is outpatient he needs to resume care   - has continued Suboxone 2 mg BID   - not taking Campral, no alcohol use in 2 months. No current cravings. Some triggers initially when watching movies   - tried a  nicotine vape pen, dry cough since for past 2 days, improving.   - smoking cigarettes about 1/2 pk per day. Has patches and gum.   - relationship with SO is \"on the rocks\" Reports SO is drinking and misusing medications, knows this is a toxic relationship, partly why he wants to continue IOP with lodging.       [place] Prescription Drug Monitoring Program Reviewed:  Yes    06/09/2023 06/09/2023   3  Buprenorphine-Nalox 2-0.5mg Tb 8.00  4        Medications:  naloxone (NARCAN) 4 MG/0.1ML nasal spray, Spray 1 spray (4 mg) into one nostril alternating nostrils as needed for opioid reversal every 2-3 minutes until assistance arrives (Patient not taking: Reported on 6/13/2023)  nicotine (COMMIT) 2 MG lozenge, Place 1 lozenge (2 mg) inside cheek every hour as needed for smoking cessation (Patient not taking: Reported on 6/13/2023)  nicotine (NICODERM CQ) 21 MG/24HR 24 hr patch, Place 1 patch onto the skin daily (Patient not taking: Reported on 6/13/2023)    No current facility-administered medications on file prior to visit.      No Known Allergies    PMH, PSH, FamHx reviewed    OBJECTIVE                                                      /77   Pulse 94   SpO2 99%     Physical Exam  Constitutional:       General: He is not in acute distress.   "   Appearance: Normal appearance.   Eyes:      Extraocular Movements: Extraocular movements intact.   Pulmonary:      Effort: Pulmonary effort is normal.   Neurological:      Mental Status: He is alert and oriented to person, place, and time.   Psychiatric:         Mood and Affect: Mood normal.         Behavior: Behavior normal.         Thought Content: Thought content normal.         Judgment: Judgment normal.         Labs:    UDS:    Lab Results   Component Value Date    BUP Screen Positive (A) 06/13/2023    BZO Negative 06/13/2023    BAR Negative 06/13/2023    SHIRA Negative 06/13/2023    MAMP Negative 06/13/2023    AMP Negative 06/13/2023    MDMA Negative 06/13/2023    MTD Negative 06/13/2023    [id number] Negative 06/13/2023    OXY Negative 06/13/2023    PCP Negative 06/13/2023    THC Negative 06/13/2023    TEMP 94 F 06/13/2023    SGPOCT 1.025 06/13/2023     *POC urine drug screen does not screen for Fentanyl    Recent Results (from the past 240 hour(s))   Drugs of Abuse Screen Urine (POC CUPS) POCT    Collection Time: 06/13/23  3:03 PM   Result Value Ref Range    POCT Kit Lot Number [id number]     POCT Kit Expiration Date 10/27/2024     Temperature Urine POCT 94 F 90 F, 92 F, 94 F, 96 F, 98 F, 100 F    Specific Imperial POCT 1.025 1.005, 1.015, 1.025    pH Qual Urine POCT 4 pH 4 pH, 5 pH, 7 pH, 9 pH    Creatinine Qual Urine POCT 50 mg/dL 20 mg/dL, 50 mg/dL, 100 mg/dL, 200 mg/dL    Internal QC Qual Urine POCT Valid Valid    Amphetamine Qual Urine POCT Negative Negative    Barbiturate Qual Urine POCT Negative Negative    Buprenorphine Qual Urine POCT Screen Positive (A) Negative    Benzodiazepine Qual Urine POCT Negative Negative    Cocaine Qual Urine POCT Negative Negative    Methamphetamine Qual Urine POCT Negative Negative    MDMA Qual Urine POCT Negative Negative    Methadone Qual Urine POCT Negative Negative    Opiate Qual Urine POCT Negative Negative    Oxycodone Qual Urine POCT Negative Negative    Phencyclidine  Qual Urine POCT Negative Negative    THC Qual Urine POCT Negative Negative       CARLOS Fernandes CNP  [facility]  [address] [place] [id number] [phone]

## 2023-07-31 ENCOUNTER — OFFICE VISIT (OUTPATIENT)
Dept: BEHAVIORAL HEALTH | Facility: CLINIC | Age: 53
End: 2023-07-31
Payer: COMMERCIAL

## 2023-07-31 ENCOUNTER — TELEPHONE (OUTPATIENT)
Dept: BEHAVIORAL HEALTH | Facility: CLINIC | Age: 53
End: 2023-07-31
Payer: COMMERCIAL

## 2023-07-31 VITALS — HEART RATE: 128 BPM | DIASTOLIC BLOOD PRESSURE: 88 MMHG | SYSTOLIC BLOOD PRESSURE: 118 MMHG

## 2023-07-31 DIAGNOSIS — F17.200 NICOTINE USE DISORDER: ICD-10-CM

## 2023-07-31 DIAGNOSIS — Z51.81 ENCOUNTER FOR MONITORING OPIOID MAINTENANCE THERAPY: ICD-10-CM

## 2023-07-31 DIAGNOSIS — Z79.891 ENCOUNTER FOR MONITORING OPIOID MAINTENANCE THERAPY: ICD-10-CM

## 2023-07-31 DIAGNOSIS — F11.21 OPIOID USE DISORDER, SEVERE, IN SUSTAINED REMISSION (H): Primary | ICD-10-CM

## 2023-07-31 DIAGNOSIS — F10.20 ALCOHOL USE DISORDER, MODERATE, DEPENDENCE (H): ICD-10-CM

## 2023-07-31 PROCEDURE — 99214 OFFICE O/P EST MOD 30 MIN: CPT | Performed by: NURSE PRACTITIONER

## 2023-07-31 PROCEDURE — 80305 DRUG TEST PRSMV DIR OPT OBS: CPT | Performed by: NURSE PRACTITIONER

## 2023-07-31 RX ORDER — BUPRENORPHINE HYDROCHLORIDE AND NALOXONE HYDROCHLORIDE DIHYDRATE 2; .5 MG/1; MG/1
1 TABLET SUBLINGUAL 2 TIMES DAILY
Qty: 60 TABLET | Refills: 0 | Status: SHIPPED | OUTPATIENT
Start: 2023-07-31 | End: 2023-08-28

## 2023-07-31 ASSESSMENT — PATIENT HEALTH QUESTIONNAIRE - PHQ9: SUM OF ALL RESPONSES TO PHQ QUESTIONS 1-9: 2

## 2023-07-31 NOTE — NURSING NOTE
[facility]  Had appt with bree coronel missed appt 7/26/23o or 7/27/23    Rooming information:  Approximate last use of full opioid agonist: 10 years ago  Taking buprenorphine? Yes:  As prescribed? Yes:   Number of buprenorphine films/tablets remaining currently: 0  Side effects related to buprenorphine (constipation, dry mouth, sedation?) No   Narcan currently available: Yes  Other recent substance use:    Denies  NICOTINE-Yes:   If using nicotine, ready to quit? Yes: one thing ta  kayce, next    Point of care urine drug screen positive for:  Lab Results   Component Value Date    BUP Screen Positive (A) 07/31/2023    BZO Negative 07/31/2023    BAR Negative 07/31/2023    SHIRA Negative 07/31/2023    MAMP Negative 07/31/2023    AMP Negative 07/31/2023    MDMA Negative 07/31/2023    MTD Negative 07/31/2023    [id number] Negative 07/31/2023    OXY Negative 07/31/2023    PCP Negative 07/31/2023    THC Negative 07/31/2023    TEMP 94 F 07/31/2023    SGPOCT 1.025 07/31/2023       *POC urine drug screen does not screen for Fentanyl          7/31/2023     1:00 PM   PHQ Assesment Total Score(s)   PHQ-9 Score 2       If PHQ-9 score of 15 or higher, has Recovery Clinic therapist or provider been notified? No    Any current suicidal ideation? No  If yes, has Recovery Clinic therapist or provider been notified? N/A    Primary care provider: Physician No Ref-Primary     Mental health provider: none (follow up on MH referral if needed)    Insurance needs: active    Housing needs: stable    Contact information up to date? yes    3rd Party Involvement not today (please obtain ROBBIE if pt would like to include)    Anne Lea MA  July 31, 2023  1:24 PM

## 2023-07-31 NOTE — PROGRESS NOTES
[facility] Recovery [facility] Follow Up    ASSESSMENT/PLAN                                                      1. Opioid use disorder, severe, in sustained remission (H)  Stable in sustained remission. Taking suboxone 4 mg TDD. Receiving suboxone from Ruy Mccurdy while in [facility]. Last prescription 2 mg #30 filled 7/5. Confirms he is taking 2 mg BID.   -Continue suboxone 2mg BID  -Continue programming at [facility] and remain in sober living  -Continue AA attendance.   - buprenorphine-naloxone (SUBOXONE) 2-0.5 MG SUBL sublingual tablet; Place 1 tablet under the tongue 2 times daily  Dispense: 60 tablet; Refill: 0    2. Encounter for monitoring opioid maintenance therapy  - Drugs of Abuse Screen Urine (POC CUPS) POCT    3. Alcohol use disorder, moderate, dependence (H)  Controlled. No alcohol since 4/10. Reports feeling better overall since he stopped drinking.   -Continue programming at [facility]  -Continue AA attendance.   -Consider rechecking hepatic profile at follow up.     4. Nicotine use disorder  Still smoking, not ready to quit. Contemplation stage.         Return in about 4 weeks (around 8/28/2023).  Patient counseling completed today:  Discussed mechanism of action, potential risks/benefits/side effects of medications and other recommendations above.    Reviewed directions for initiation of buprenorphine to reduce risk of precipitated withdrawal and maximize efficacy.    Harm reduction counseling including never use alone, availability of naloxone, risks associated with concurrent use of opioids and benzodiazepines, alcohol, or other sedatives, safer administration as applicable.  Discussed importance of avoiding isolation, building a network of supportive relationships, avoiding people/places/things associated with past use to reduce risk of relapse; including motivational interviewing regarding psychosocial treatment for addiction.   SUBJECTIVE                                                       "      Justus Cope is a 53 year old male with PMH neck pain, depression with anxiety, moderate alcohol use disorder, tobacco dependence, and opioid dependence  who presents to the Recovery Clinic for follow up. Pt was referred by Dr. Shimon Arciniega in Addiction medication.      First Recovery Clinic visit:  7/21/22     Brief history of use:   Previously following with Dr. Arciniega in Addiction medicine for ongoing management of opioid dependence and alcohol use disorder moderate. At initial visit pt reports he has continue Suboxone despite being \"out\" for 2 months. He had been breaking each tablet into multiple pieces and taking it once per day. He will be a couple days between doses. Denies recent full opioid agonist use. Reports he was taking percocet when he was living in [place]. 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 Reports drinking about 3 beers every other day. His goal is NOT abstinence. He has been previously positive for benzo's in the past. No prescribed this medication. He has been going to . \"I know my drinking is problematic.\" Detox ([facility]) from 4/10- 4/17, started inpatient at [facility], now outpatient.      Substance Use History :  Opioids:   Age at first use: [birth year]/2010  Current use: timing of last use: 2015; substance: prescribed pain meds; route: oral and snort                 IV drug use: No   History of overdose: No  Previous treatments : No  Longest period of sobriety: currently   Medical complications related to substance use: denies  Hepatitis C: negative per pt; no recent screening found   HIV: negative per pt; no recent screening found     Other Addiction History:  Stimulants:   Past use: denies  Use in last 6 months: denies  Sedatives/hypnotics/anxiolytics:   Past use: denies  Use in last 6 months: denies - UDS positive for BZO on 7/21/22  Alcohol:   Past use: occasional   Use in last 6 months: 2-4 beers " "every other day. LAST USE 4/10/23   Nicotine:   Cigarettes: 1 pack daily  Vaping: denies  Chewing tobacco: denies  Cannabis:   Past use: occasionally  Use in last 6 months: denies  Hallucinogens:   Past use: not for over 25 years  Use in last 6 months: denies  Behavioral addictions:   denies     PAST PSYCHIATRIC HISTORY:  Diagnoses- depression with anxiety   Suicide Attempts: No   Hospitalizations: No      Social History  Housing status: with girlfriend  Employment status: Employed full time  Relationship status: Partnered  Children: 1 child, 26 YO  Legal: denies  Insurance needs: active  Contact information up to date? yes      4/13/2023    12:28 PM 6/13/2023     3:00 PM 7/31/2023     1:00 PM   PHQ   PHQ-9 Total Score 0 1 2   Q9: Thoughts of better off dead/self-harm past 2 weeks Not at all Not at all Not at all       Most recent Recovery Clinic visit 6/13/23  .- here today for follow up, completed inpatient at [facility] 30 days   - now in outpatient at [facility] in [place], this is 3 month program. Has lodging. All medications are managed.   - had continued suboxone while in treatment   - now that he is outpatient he needs to resume care   - has continued Suboxone 2 mg BID   - not taking Campral, no alcohol use in 2 months. No current cravings. Some triggers initially when watching movies   - tried a  nicotine vape pen, dry cough since for past 2 days, improving.   - smoking cigarettes about 1/2 pk per day. Has patches and gum.   - relationship with SO is \"on the rocks\" Reports SO is drinking and misusing medications, knows this is a toxic relationship, partly why he wants to continue IOP with lodging.     A/P last visit:  1. Opioid use disorder, severe, in sustained remission (H)  2. Encounter for monitoring opioid maintenance therapy  Controlled. Sustained remission. Continue Suboxone unchanged, 2 mg BID.   Confirms access to narcan   Continue IOP with lodging   UDS as expected positive only for buprenorphine "   - buprenorphine-naloxone (SUBOXONE) 2-0.5 MG SUBL sublingual tablet; Place 1 tablet under the tongue 2 times daily  Dispense: 60 tablet; Refill: 0  - Drugs of Abuse Screen Urine (POC CUPS) POCT     3. Alcohol use disorder, moderate, dependence (H)  - Controlled, no alcohol use in ~ 2 months. Detox and inpatient completed, not taking Campral at this time, stopped after detox. No cravings. Continue outpatient programming with lodging at [facility]. Monitor for cravings.      4. Nicotine use disorder  - smoking 1/2 pk per day, contemplative regarding cessation, does have NRT if needed.     07/31/23 visit:  -Still in [facility] and sober living   -Attending AA regularly, has a sponsor  -Has been getting [facility], did not provide enough suboxone,ran out last Friday.  #30 2 mg films 7/5. Takes 2 mg BID. Unable to get an apppintment with [facility]urdy until tomorrow. Plans to come into  from now on.   -Experiences some withdrawal insomnia, sensory symptoms, diarrhea, poor appetite.   -Denies SE, constipation  -Denies alcohol cravings, abstained since 4/10.   -Confirms narcan access.   -Labs discussed with patient?  Yes      [place] Prescription Drug Monitoring Program Reviewed:  Yes  07/05/2023 07/05/2023 4 Suboxone 2 Mg-0.5 Mg Sl Film 30.00 30 Ni Sol [id number] Gua (1162) 0/0 2.00 mg Comm Ins [place]   06/13/2023 06/13/2023 4 Buprenorphine-Nalox 2-0.5mg Tb 60.00 30 Cl Max [id number] Gua (1162) 0/0 4.00 mg [place]   06/09/2023 06/09/2023 4 Buprenorphine-Nalox 2-0.5mg Tb 8.00 4 Li Vol [id number] Gua (1162) 0/0 4.00 mg [place]   06/02/2023 06/02/2023 4 Buprenorphine-Nalox 8-2 Mg Tab 14.00 7 Li Vol [id number] Gua (1162) 0/0 16.00 mg       Medications:  naloxone (NARCAN) 4 MG/0.1ML nasal spray, Spray 1 spray (4 mg) into one nostril alternating nostrils as needed for opioid reversal every 2-3 minutes until assistance arrives (Patient not taking: Reported on 6/13/2023)  nicotine (COMMIT) 2 MG  lozenge, Place 1 lozenge (2 mg) inside cheek every hour as needed for smoking cessation (Patient not taking: Reported on 6/13/2023)  nicotine (NICODERM CQ) 21 MG/24HR 24 hr patch, Place 1 patch onto the skin daily (Patient not taking: Reported on 6/13/2023)    No current facility-administered medications on file prior to visit.      No Known Allergies    PMH, PSH, FamHx reviewed      OBJECTIVE                                                      /88   Pulse (!) 128     Physical Exam  HENT:      Head: Normocephalic.      Nose: Nose normal.   Eyes:      Conjunctiva/sclera: Conjunctivae normal.   Cardiovascular:      Rate and Rhythm: Tachycardia present.   Pulmonary:      Effort: Pulmonary effort is normal.   Neurological:      General: No focal deficit present.      Mental Status: He is alert and oriented to person, place, and time.      Coordination: Coordination is intact.      Gait: Gait is intact.   Psychiatric:         Attention and Perception: Attention normal.         Mood and Affect: Mood normal.         Speech: Speech normal.         Behavior: Behavior is cooperative.         Thought Content: Thought content normal.         Judgment: Judgment normal.         Labs:    UDS:    Lab Results   Component Value Date    BUP Screen Positive (A) 07/31/2023    BZO Negative 07/31/2023    BAR Negative 07/31/2023    SHIRA Negative 07/31/2023    MAMP Negative 07/31/2023    AMP Negative 07/31/2023    MDMA Negative 07/31/2023    MTD Negative 07/31/2023    [id number] Negative 07/31/2023    OXY Negative 07/31/2023    PCP Negative 07/31/2023    THC Negative 07/31/2023    TEMP 94 F 07/31/2023    SGPOCT 1.025 07/31/2023     *POC urine drug screen does not screen for Fentanyl      Recent Results (from the past 240 hour(s))   Drugs of Abuse Screen Urine (POC CUPS) POCT    Collection Time: 07/31/23  1:53 PM   Result Value Ref Range    POCT Kit Lot Number [id number]     POCT Kit Expiration Date 1202024     Temperature Urine POCT 94 F  90 F, 92 F, 94 F, 96 F, 98 F, 100 F    Specific [place] POCT 1.025 1.005, 1.015, 1.025    pH Qual Urine POCT 5 pH 4 pH, 5 pH, 7 pH, 9 pH    Creatinine Qual Urine POCT 100 mg/dL 20 mg/dL, 50 mg/dL, 100 mg/dL, 200 mg/dL    Internal QC Qual Urine POCT Valid Valid    Amphetamine Qual Urine POCT Negative Negative    Barbiturate Qual Urine POCT Negative Negative    Buprenorphine Qual Urine POCT Screen Positive (A) Negative    Benzodiazepine Qual Urine POCT Negative Negative    Cocaine Qual Urine POCT Negative Negative    Methamphetamine Qual Urine POCT Negative Negative    MDMA Qual Urine POCT Negative Negative    Methadone Qual Urine POCT Negative Negative    Opiate Qual Urine POCT Negative Negative    Oxycodone Qual Urine POCT Negative Negative    Phencyclidine Qual Urine POCT Negative Negative    THC Qual Urine POCT Negative Negative           At least 30 min spent on day of encounter in review of medical record,  review, obtaining histories, discussing recommendations, counseling/coordination of care    Latrice Cohen, [facility]  [address] [address] [phone]

## 2023-07-31 NOTE — TELEPHONE ENCOUNTER
Writer attempted to contact patient regarding Suboxone request. Patient was last seen for a visit in the Recovery Clinic on 06/13/2023 with the recommendation to  Return in about 4 weeks (around 7/11/2023) for Follow up, with any available provider, in person in Addiction Medicine .      Patient did not show for his appointment that was scheduled in Addiction Medicine on 07/12/2023. Writer attempted to contact patient, no answer; left VM message with the recommendation to follow up in the Recovery Clinic for further refills.     [address], [address]   [place], [place], [address]  Phone: [phone]  Fax: [phone]    Open Monday-Friday  Closed over lunch hour  Walk in hours: 9am-11:30am and 12:30-3pm    Alicia Vidal RN on 7/31/2023 at 9:11 AM

## 2023-07-31 NOTE — TELEPHONE ENCOUNTER
Pt left vm at  Recovery requesting suboxone refills. States he is currently at [facility] and only has 1 day supply left.     Pharmacy:   [facility] [address]     Call back #: [phone]

## 2023-08-22 ENCOUNTER — TELEPHONE (OUTPATIENT)
Dept: BEHAVIORAL HEALTH | Facility: CLINIC | Age: 53
End: 2023-08-22
Payer: COMMERCIAL

## 2023-08-28 ENCOUNTER — OFFICE VISIT (OUTPATIENT)
Dept: BEHAVIORAL HEALTH | Facility: CLINIC | Age: 53
End: 2023-08-28
Payer: COMMERCIAL

## 2023-08-28 VITALS — SYSTOLIC BLOOD PRESSURE: 138 MMHG | HEART RATE: 98 BPM | DIASTOLIC BLOOD PRESSURE: 92 MMHG

## 2023-08-28 DIAGNOSIS — F11.21 OPIOID USE DISORDER, SEVERE, IN SUSTAINED REMISSION (H): Primary | ICD-10-CM

## 2023-08-28 DIAGNOSIS — Z79.891 ENCOUNTER FOR MONITORING OPIOID MAINTENANCE THERAPY: ICD-10-CM

## 2023-08-28 DIAGNOSIS — F17.200 NICOTINE USE DISORDER: ICD-10-CM

## 2023-08-28 DIAGNOSIS — F10.20 ALCOHOL USE DISORDER, MODERATE, DEPENDENCE (H): ICD-10-CM

## 2023-08-28 DIAGNOSIS — Z51.81 ENCOUNTER FOR MONITORING OPIOID MAINTENANCE THERAPY: ICD-10-CM

## 2023-08-28 LAB
AMPHETAMINE QUAL URINE POCT: NEGATIVE
BARBITURATE QUAL URINE POCT: NEGATIVE
BENZODIAZEPINE QUAL URINE POCT: ABNORMAL
BUPRENORPHINE QUAL URINE POCT: ABNORMAL
COCAINE QUAL URINE POCT: NEGATIVE
CREATININE QUAL URINE POCT: ABNORMAL
INTERNAL QC QUAL URINE POCT: ABNORMAL
MDMA QUAL URINE POCT: NEGATIVE
METHADONE QUAL URINE POCT: NEGATIVE
METHAMPHETAMINE QUAL URINE POCT: NEGATIVE
OPIATE QUAL URINE POCT: NEGATIVE
OXYCODONE QUAL URINE POCT: NEGATIVE
PH QUAL URINE POCT: ABNORMAL
PHENCYCLIDINE QUAL URINE POCT: NEGATIVE
POCT KIT EXPIRATION DATE: ABNORMAL
POCT KIT LOT NUMBER: ABNORMAL
SPECIFIC GRAVITY POCT: 1.02
TEMPERATURE URINE POCT: ABNORMAL
THC QUAL URINE POCT: NEGATIVE

## 2023-08-28 PROCEDURE — G0480 DRUG TEST DEF 1-7 CLASSES: HCPCS | Performed by: FAMILY MEDICINE

## 2023-08-28 PROCEDURE — 99214 OFFICE O/P EST MOD 30 MIN: CPT | Performed by: FAMILY MEDICINE

## 2023-08-28 RX ORDER — BUPRENORPHINE HYDROCHLORIDE AND NALOXONE HYDROCHLORIDE DIHYDRATE 2; .5 MG/1; MG/1
1 TABLET SUBLINGUAL 2 TIMES DAILY
Qty: 60 TABLET | Refills: 0 | Status: SHIPPED | OUTPATIENT
Start: 2023-08-28 | End: 2023-09-29

## 2023-08-28 ASSESSMENT — PATIENT HEALTH QUESTIONNAIRE - PHQ9: SUM OF ALL RESPONSES TO PHQ QUESTIONS 1-9: 0

## 2023-08-28 NOTE — NURSING NOTE
[facility]    Still doing Outpatient detox, going good.  Rooming information:  Approximate last use of full opioid agonist: 10 years ago  Taking buprenorphine? Yes:   How much per day? 2 films  Number of buprenorphine films/tablets remaining currently: 5  Side effects related to buprenorphine (constipation, dry mouth, sedation?) No   Narcan currently available: Yes  Other recent substance use:    Alcohol  NICOTINE-Yes:   If using nicotine, ready to quit? Yes:  one thing at time    Point of care urine drug screen positive for:  Lab Results   Component Value Date    BUP Screen Positive (A) 08/28/2023    BZO Screen Positive (A) 08/28/2023    BAR Negative 08/28/2023    SHIRA Negative 08/28/2023    MAMP Negative 08/28/2023    AMP Negative 08/28/2023    MDMA Negative 08/28/2023    MTD Negative 08/28/2023    ONW365 Negative 08/28/2023    OXY Negative 08/28/2023    PCP Negative 08/28/2023    THC Negative 08/28/2023    TEMP 92 F 08/28/2023    SGPOCT 1.025 08/28/2023       *POC urine drug screen does not screen for Fentanyl            8/28/2023    10:00 AM   PHQ Assesment Total Score(s)   PHQ-9 Score 0       If PHQ-9 score of 15 or higher, has Recovery Clinic therapist or provider been notified? No    Any current suicidal ideation? No  If yes, has Recovery Clinic therapist or provider been notified? N/A    Primary care provider: Physician No Ref-Primary     Mental health provider: denies (follow up on MH referral if needed)    Insurance needs: active    Housing needs: stable    Contact information up to date? yes    3rd Party Involvement not today (please obtain ROBBIE if pt would like to include)    Anne Lea MA  August 28, 2023  10:36 AM

## 2023-08-28 NOTE — Clinical Note
May look at transferring Steven back to [facility] in 1-2 months as long things are stable.

## 2023-08-28 NOTE — PROGRESS NOTES
[facility] Recovery [facility] Follow Up    ASSESSMENT/PLAN                                                      1. Opioid use disorder, severe, in sustained remission (H)  Stable.  Continue Suboxone, no change.  Has access to Narcan.  Continue IOP and meetings.  - buprenorphine-naloxone (SUBOXONE) 2-0.5 MG SUBL sublingual tablet; Place 1 tablet under the tongue 2 times daily  Dispense: 60 tablet; Refill: 0    2. Encounter for monitoring opioid maintenance therapy  Will add confirmatory testing today given + benzo on POC UDS.    - Drugs of Abuse Screen Urine (POC CUPS) POCT  - Benzodiazepines, Urine, Quantitative; Future    3. Alcohol use disorder, moderate, dependence (H)  Brief return to use, drank 2 beers.  Working with counselors and treatment program.  Encouraged continued recovery activities.  Will plan to recheck LFTs at follow-up.      4. Nicotine use disorder  Has decreased use but not ready to quit.       Return in about 4 weeks (around 9/25/2023) for Follow up, in person.  Consider transfer back to [facility] in 1-2 months.    Patient counseling completed today:  Discussed mechanism of action, potential risks/benefits/side effects of medications and other recommendations above.    Reviewed directions for initiation of buprenorphine to reduce risk of precipitated withdrawal and maximize efficacy.    Harm reduction counseling including never use alone, availability of naloxone, risks associated with concurrent use of opioids and benzodiazepines, alcohol, or other sedatives, safer administration as applicable.  Discussed importance of avoiding isolation, building a network of supportive relationships, avoiding people/places/things associated with past use to reduce risk of relapse; including motivational interviewing regarding psychosocial interventions.   SUBJECTIVE                                                          CC/HPI:  Justus Cope is a 53 year old male with PMH neck pain, depression with  "anxiety, moderate alcohol use disorder, tobacco dependence, and opioid dependence  who presents to the Recovery Clinic for follow up. Pt was referred by Dr. Shimon Arciniega in Addiction medication.      First Recovery Clinic visit:  7/21/22     Brief history of use:   Previously following with Dr. Arciniega in Addiction medicine for ongoing management of opioid dependence and alcohol use disorder moderate. At initial visit pt reports he has continue Suboxone despite being \"out\" for 2 months. He had been breaking each tablet into multiple pieces and taking it once per day. He will be a couple days between doses. Denies recent full opioid agonist use. Reports he was taking percocet when he was living in [place]. 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 Reports drinking about 3 beers every other day. His goal is NOT abstinence. He has been previously positive for benzo's in the past. No prescribed this medication. He has been going to . \"I know my drinking is problematic.\" Detox ([facility]) from 4/10- 4/17, started inpatient at [facility], now outpatient.      Substance Use History :  Opioids:   Age at first use: [birth year]/2010  Current use: timing of last use: 2015; substance: prescribed pain meds; route: oral and snort                 IV drug use: No   History of overdose: No  Previous treatments : No  Longest period of sobriety: currently   Medical complications related to substance use: denies  Hepatitis C: negative per pt; no recent screening found   HIV: negative per pt; no recent screening found     Other Addiction History:  Stimulants:   Past use: denies  Use in last 6 months: denies  Sedatives/hypnotics/anxiolytics:   Past use: denies  Use in last 6 months: denies - UDS positive for BZO on 7/21/22  Alcohol:   Past use: occasional   Use in last 6 months: 2-4 beers every other day. LAST USE 4/10/23   Nicotine:   Cigarettes: 1 pack " daily  Vaping: denies  Chewing tobacco: denies  Cannabis:   Past use: occasionally  Use in last 6 months: denies  Hallucinogens:   Past use: not for over 25 years  Use in last 6 months: denies  Behavioral addictions:   denies     PAST PSYCHIATRIC HISTORY:  Diagnoses- depression with anxiety   Suicide Attempts: No   Hospitalizations: No         6/13/2023     3:00 PM 7/31/2023     1:00 PM 8/28/2023    10:00 AM   PHQ   PHQ-9 Total Score 1 2 0   Q9: Thoughts of better off dead/self-harm past 2 weeks Not at all Not at all Not at all       Social History  Housing status: with girlfriend  Employment status: Employed part time (36 hours) - [employer]  Relationship status: Partnered  Children: 1 child, 26 YO  Legal: denies  Insurance needs: active  Contact information up to date? yes      Most recent Recovery Clinic visit (7/31/23):     A/P last visit:  1. Opioid use disorder, severe, in sustained remission (H)  Stable in sustained remission. Taking suboxone 4 mg TDD. Receiving suboxone from Ruy Mccurdy while in [facility]. Last prescription 2 mg #30 filled 7/5. Confirms he is taking 2 mg BID.   -Continue suboxone 2mg BID  -Continue programming at [facility] and remain in sober living  -Continue AA attendance.   - buprenorphine-naloxone (SUBOXONE) 2-0.5 MG SUBL sublingual tablet; Place 1 tablet under the tongue 2 times daily  Dispense: 60 tablet; Refill: 0     2. Encounter for monitoring opioid maintenance therapy  - Drugs of Abuse Screen Urine (POC CUPS) POCT     3. Alcohol use disorder, moderate, dependence (H)  Controlled. No alcohol since 4/10. Reports feeling better overall since he stopped drinking.   -Continue programming at [facility]  -Continue AA attendance.   -Consider rechecking hepatic profile at follow up.      4. Nicotine use disorder  Still smoking, not ready to quit. Contemplation stage.    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  Yes      [place] Prescription Drug Monitoring Program Reviewed:  Yes; as expected    Medications:  naloxone (NARCAN) 4 MG/0.1ML nasal spray, Spray 1 spray (4 mg) into one nostril alternating nostrils as needed for opioid reversal every 2-3 minutes until assistance arrives (Patient not taking: Reported on 6/13/2023)  nicotine (COMMIT) 2 MG lozenge, Place 1 lozenge (2 mg) inside cheek every hour as needed for smoking cessation (Patient not taking: Reported on 6/13/2023)  nicotine (NICODERM CQ) 21 MG/24HR 24 hr patch, Place 1 patch onto the skin daily (Patient not taking: Reported on 6/13/2023)    No current facility-administered medications on file prior to visit.      No Known Allergies    PMH, PSH, FamHx reviewed      OBJECTIVE                                                      BP (!) 138/92   Pulse 98     Physical Exam  Vitals and nursing note reviewed.   Constitutional:       General: He is not in acute distress.     Appearance: Normal appearance. He is not ill-appearing or diaphoretic.   HENT:      Head: Normocephalic and atraumatic.      Mouth/Throat:      Mouth: Mucous membranes are moist.   Eyes:      General: No scleral icterus.  Cardiovascular:      Rate  and Rhythm: Normal rate.   Pulmonary:      Effort: Pulmonary effort is normal. No respiratory distress.   Skin:     Coloration: Skin is not jaundiced or pale.   Neurological:      General: No focal deficit present.      Mental Status: He is alert and oriented to person, place, and time.      Gait: Gait normal.   Psychiatric:         Mood and Affect: Mood normal.         Behavior: Behavior normal.         Thought Content: Thought content normal.         Judgment: Judgment normal.         Labs:    UDS:    Lab Results   Component Value Date    BUP Screen Positive (A) 08/28/2023    BZO Screen Positive (A) 08/28/2023    BAR Negative 08/28/2023    SHIRA Negative 08/28/2023    MAMP Negative 08/28/2023    AMP Negative 08/28/2023    MDMA Negative 08/28/2023    MTD Negative 08/28/2023    [id number] Negative 08/28/2023    OXY Negative 08/28/2023    PCP Negative 08/28/2023    THC Negative 08/28/2023    TEMP 92 F 08/28/2023    SGPOCT 1.025 08/28/2023     *POC urine drug screen does not screen for Fentanyl      Recent Results (from the past 240 hour(s))   Drugs of Abuse Screen Urine (POC CUPS) POCT    Collection Time: 08/28/23 10:42 AM   Result Value Ref Range    POCT Kit Lot Number [id number]     POCT Kit Expiration Date 7830000     Temperature Urine POCT 92 F 90 F, 92 F, 94 F, 96 F, 98 F, 100 F    Specific [place] POCT 1.025 1.005, 1.015, 1.025    pH Qual Urine POCT 5 pH 4 pH, 5 pH, 7 pH, 9 pH    Creatinine Qual Urine POCT 50 mg/dL 20 mg/dL, 50 mg/dL, 100 mg/dL, 200 mg/dL    Internal QC Qual Urine POCT Valid Valid    Amphetamine Qual Urine POCT Negative Negative    Barbiturate Qual Urine POCT Negative Negative    Buprenorphine Qual Urine POCT Screen Positive (A) Negative    Benzodiazepine Qual Urine POCT Screen Positive (A) Negative    Cocaine Qual Urine POCT Negative Negative    Methamphetamine Qual Urine POCT Negative Negative    MDMA Qual Urine POCT Negative Negative    Methadone Qual Urine POCT Negative Negative    Opiate Qual  Urine POCT Negative Negative    Oxycodone Qual Urine POCT Negative Negative    Phencyclidine Qual Urine POCT Negative Negative    THC Qual Urine POCT Negative Negative       Marylu Dawn DO  [facility]  [address] [place] [address] [id number] [phone]

## 2023-09-25 ENCOUNTER — TELEPHONE (OUTPATIENT)
Dept: BEHAVIORAL HEALTH | Facility: CLINIC | Age: 53
End: 2023-09-25

## 2023-09-25 NOTE — TELEPHONE ENCOUNTER
PRS placed a telephone recovery support call to pt given a recent no show for scheduled appointment with provider in Recovery Clinic.  Pt wasn't available.

## 2023-09-29 DIAGNOSIS — F11.21 OPIOID USE DISORDER, SEVERE, IN SUSTAINED REMISSION (H): ICD-10-CM

## 2023-09-29 RX ORDER — BUPRENORPHINE HYDROCHLORIDE AND NALOXONE HYDROCHLORIDE DIHYDRATE 2; .5 MG/1; MG/1
1 TABLET SUBLINGUAL 2 TIMES DAILY
Qty: 10 TABLET | Refills: 0 | Status: SHIPPED | OUTPATIENT
Start: 2023-09-29 | End: 2023-10-05

## 2023-09-29 NOTE — TELEPHONE ENCOUNTER
Reason for call:  Other   Patient called regarding (reason for call): appointment  Additional comments: pt is requesting bridge on sbxn until he is able to ome in fr a apt or walk in . Pt is completely out of med's - please  call number on file     Phone number to reach patient:  Home number on file [phone] (home)    Best Time:  any     Can we leave a detailed message on this number?  YES    Travel screening: Negative

## 2023-09-29 NOTE — TELEPHONE ENCOUNTER
[facility]. Using the same pharmacy.    Date of Last Office Visit: 8/28/2023  Date of Next Office Visit: 10/2/2023  No shows since last visit: (1) 9/25/2023  Cancellations since last visit: 0    Medication requested: buprenorphine-naloxone (SUBOXONE) 2-0.5 MG SUBL sublingual tablet  Date last ordered: 8/28/2023 Qty: 60 Refills: 0     Review of [place] ?: No not a delegate of Dr. Dawn      Lapse in medication adherence greater than 5 days?: No    Medication refill request verified as identical to current order?: Yes  Result of Last DAM, VPA, Li+ Level, CBC, or Carbamazepine Level (at or since last visit): N/A    Last visit treatment plan:   ASSESSMENT/PLAN                                                       1. Opioid use disorder, severe, in sustained remission (H)  Stable.  Continue Suboxone, no change.  Has access to Narcan.  Continue IOP and meetings.  - buprenorphine-naloxone (SUBOXONE) 2-0.5 MG SUBL sublingual tablet; Place 1 tablet under the tongue 2 times daily  Dispense: 60 tablet; Refill: 0     2. Encounter for monitoring opioid maintenance therapy  Will add confirmatory testing today given + benzo on POC UDS.    - Drugs of Abuse Screen Urine (POC CUPS) POCT  - Benzodiazepines, Urine, Quantitative; Future     3. Alcohol use disorder, moderate, dependence (H)  Brief return to use, drank 2 beers.  Working with counselors and treatment program.  Encouraged continued recovery activities.  Will plan to recheck LFTs at follow-up.       4. Nicotine use disorder  Has decreased use but not ready to quit.                   Return in about 4 weeks (around 9/25/2023) for Follow up, in person.  Consider transfer back to [facility] in 1-2 months.       []Medication refilled per  Medication Refill in Ambulatory Care  policy.  [x]Medication unable to be refilled by RN due to criteria not met as indicated below:    []Eligibility - not seen in the last  year   []Supervision - no future appointment   []Compliance - no shows, cancellations or lapse in therapy   []Verification - order discrepancy   [x]Controlled medication   []Medication not included in policy   []90-day supply request   []Other

## 2023-10-05 ENCOUNTER — OFFICE VISIT (OUTPATIENT)
Dept: BEHAVIORAL HEALTH | Facility: CLINIC | Age: 53
End: 2023-10-05
Payer: COMMERCIAL

## 2023-10-05 VITALS — HEART RATE: 89 BPM | SYSTOLIC BLOOD PRESSURE: 151 MMHG | DIASTOLIC BLOOD PRESSURE: 100 MMHG

## 2023-10-05 DIAGNOSIS — F11.21 OPIOID USE DISORDER, SEVERE, IN SUSTAINED REMISSION (H): Primary | ICD-10-CM

## 2023-10-05 DIAGNOSIS — F17.200 NICOTINE USE DISORDER: ICD-10-CM

## 2023-10-05 DIAGNOSIS — Z51.81 ENCOUNTER FOR MONITORING OPIOID MAINTENANCE THERAPY: ICD-10-CM

## 2023-10-05 DIAGNOSIS — R69 DIAGNOSIS DEFERRED: Primary | ICD-10-CM

## 2023-10-05 DIAGNOSIS — Z79.891 ENCOUNTER FOR MONITORING OPIOID MAINTENANCE THERAPY: ICD-10-CM

## 2023-10-05 DIAGNOSIS — F10.20 ALCOHOL USE DISORDER, MODERATE, DEPENDENCE (H): ICD-10-CM

## 2023-10-05 LAB
AMPHETAMINE QUAL URINE POCT: NEGATIVE
BARBITURATE QUAL URINE POCT: NEGATIVE
BENZODIAZEPINE QUAL URINE POCT: NEGATIVE
BUPRENORPHINE QUAL URINE POCT: ABNORMAL
COCAINE QUAL URINE POCT: NEGATIVE
CREATININE QUAL URINE POCT: ABNORMAL
INTERNAL QC QUAL URINE POCT: ABNORMAL
MDMA QUAL URINE POCT: NEGATIVE
METHADONE QUAL URINE POCT: NEGATIVE
METHAMPHETAMINE QUAL URINE POCT: NEGATIVE
OPIATE QUAL URINE POCT: NEGATIVE
OXYCODONE QUAL URINE POCT: NEGATIVE
PH QUAL URINE POCT: ABNORMAL
PHENCYCLIDINE QUAL URINE POCT: NEGATIVE
POCT KIT EXPIRATION DATE: ABNORMAL
POCT KIT LOT NUMBER: ABNORMAL
SPECIFIC GRAVITY POCT: 1.02
TEMPERATURE URINE POCT: ABNORMAL
THC QUAL URINE POCT: ABNORMAL

## 2023-10-05 PROCEDURE — 99215 OFFICE O/P EST HI 40 MIN: CPT | Performed by: FAMILY MEDICINE

## 2023-10-05 PROCEDURE — H0038 SELF-HELP/PEER SVC PER 15MIN: HCPCS

## 2023-10-05 PROCEDURE — 80305 DRUG TEST PRSMV DIR OPT OBS: CPT | Performed by: FAMILY MEDICINE

## 2023-10-05 RX ORDER — GABAPENTIN 100 MG/1
CAPSULE ORAL
Qty: 240 CAPSULE | Refills: 0 | Status: SHIPPED | OUTPATIENT
Start: 2023-10-05 | End: 2023-12-27

## 2023-10-05 RX ORDER — BUPRENORPHINE HYDROCHLORIDE AND NALOXONE HYDROCHLORIDE DIHYDRATE 2; .5 MG/1; MG/1
1 TABLET SUBLINGUAL 2 TIMES DAILY
Qty: 60 TABLET | Refills: 0 | Status: SHIPPED | OUTPATIENT
Start: 2023-10-05 | End: 2023-11-02

## 2023-10-05 ASSESSMENT — PATIENT HEALTH QUESTIONNAIRE - PHQ9: SUM OF ALL RESPONSES TO PHQ QUESTIONS 1-9: 4

## 2023-10-05 NOTE — NURSING NOTE
[facility]     Used THC first time in a while past weekend increased anxiety  Rooming information:  Approximate last use of full opioid agonist: 10 years ago  Taking buprenorphine? Yes:   How much per day? 10mg  Number of buprenorphine films/tablets remaining currently: 0  Side effects related to buprenorphine (constipation, dry mouth, sedation?) No   Narcan currently available: Yes  Other recent substance use:    Cannabis   NICOTINE-Yes:   If using nicotine, ready to quit? Yes:   Point of care urine drug screen positive for:  Lab Results   Component Value Date    BUP Screen Positive (A) 10/05/2023    BZO Negative 10/05/2023    BAR Negative 10/05/2023    SHIRA Negative 10/05/2023    MAMP Negative 10/05/2023    AMP Negative 10/05/2023    MDMA Negative 10/05/2023    MTD Negative 10/05/2023    [id number] Negative 10/05/2023    OXY Negative 10/05/2023    PCP Negative 10/05/2023    THC Screen Positive (A) 10/05/2023    TEMP 92 F 10/05/2023    SGPOCT 1.025 10/05/2023       *POC urine drug screen does not screen for Fentanyl            10/5/2023     1:00 PM   PHQ Assesment Total Score(s)   PHQ-9 Score 4       If PHQ-9 score of 15 or higher, has Recovery Clinic therapist or provider been notified? No    Any current suicidal ideation? No  If yes, has Recovery Clinic therapist or provider been notified? N/A    Primary care provider: Physician No Ref-Primary     Mental health provider: denies (follow up on MH referral if needed)    Insurance needs: active    Housing needs: [facility] in [place] in a few days    Contact information up to date? yes    3rd Party Involvement not today (please obtain ROBBIE if pt would like to include)    Anne Lea MA  October 5, 2023  1:51 PM

## 2023-10-05 NOTE — PROGRESS NOTES
[facility] Recovery Clinic Follow Up    ASSESSMENT/PLAN                                                    1. Opioid use disorder, severe, in sustained remission (H)  Pt reporting control of symptoms   Continue buprenorphine 4mg TDD  Continue programming with [facility]  Pt confirms he has naloxone  - buprenorphine-naloxone (SUBOXONE) 2-0.5 MG SUBL sublingual tablet; Place 1 tablet under the tongue 2 times daily  Dispense: 60 tablet; Refill: 0    2. Alcohol use disorder, moderate, dependence (H)  Pt endorsing anxiety, difficulty sleeping, and some cravings.   Denies alcohol use since 8/2023.   Requested rx for Valium 5mg bid for short period of time.  Recommended gabapentin vs benzodiazepine  Has taken gabapentin in the past and experienced some dizziness.   Re-trial of gabapentin to address prolonged withdrawal symptoms, starting with low dose and ability to titrate as noted  Continue programming at [facility]  - gabapentin (NEURONTIN) 100 MG capsule; 1-2 po bid prn anxiety and 3-4 po qhs  Dispense: 240 capsule; Refill: 0    3. Nicotine use disorder  Has NRT, states he is not ready to quit at this time.  Continue to monitor    4. Encounter for monitoring opioid maintenance therapy  - Drugs of Abuse Screen Urine (POC CUPS) POCT    Return in about 4 weeks (around 11/2/2023) for Follow up, in person.      Patient counseling completed today:  Discussed mechanism of action, potential risks/benefits/side effects of medications and other recommendations above.    Harm reduction counseling including never use alone, availability of naloxone, risks associated with concurrent use of opioids and benzodiazepines, alcohol, or other sedatives, safer administration as applicable.  Discussed importance of avoiding isolation, building a network of supportive relationships, avoiding people/places/things associated with past use to reduce risk of relapse; including motivational interviewing regarding psychosocial  "interventions.   SUBJECTIVE                                                          CC/HPI:  Justus Cope is a 53 year old male with PMH neck pain, depression with anxiety, moderate alcohol use disorder, tobacco dependence, and opioid dependence  who presents to the Recovery Clinic for follow up. Pt was referred by Dr. Shimon Arciniega in Addiction medication.      First Recovery Clinic visit:  7/21/22     Brief history of use:   Previously following with Dr. Arciniega in Addiction medicine for ongoing management of OUD and AUD. At initial  visit pt reports he has continue Suboxone despite being \"out\" for 2 months. He had been breaking each tablet into multiple pieces and taking it once per day. He will be a couple days between doses. Denies recent full opioid agonist use. Reports he was taking percocet when he was living in [place]. He is currently working full time at 2 different jobs, [employer] and as a PCA. Reports he receives alcohol from his job. Reports drinking about 3 beers every other day. His goal is NOT abstinence. He has been previously positive for benzo's in the past. Not prescribed this medication. He has been going to . \"I know my drinking is problematic.\" Detox ([facility]) from 4/10- 4/17, started inpatient at [facility], now outpatient.    Episode of alcohol use 8/2023, returned to residential, re-starting IOP 10/9/23     Substance Use History :  Opioids:    first use: 3/2010 - prescribed oxycodone and morphine for chronic pain  Current use: timing of last use: 2015; substance: prescribed pain meds; route: oral and IN                IV drug use: No   History of overdose: No  Previous treatments : No  Longest period of sobriety: currently   Medical complications related to substance use: denies  Hepatitis C: negative per pt; no recent screening found   HIV: negative per pt; no recent screening found     Other Addiction History:  Stimulants:    denies  Sedatives/hypnotics/anxiolytics:   Past use: " denies  Use in last 6 months: denies - UDS positive for BZO on 7/21/22, 3/23/23; also 8/28/23 but confirmatory negative  Alcohol:   Past use: h/o drinking throughout the day  Use in last 6 months: 2-4 beers every other day. LAST USE 8/2023 - re-entered residential, transfer back to [facility] 10/2023  Nicotine:   Cigarettes: 1 pack daily  Cannabis:   Past use: occasionally  Hallucinogens:   Past use: not for over 25 years  Behavioral addictions:   denies     PAST PSYCHIATRIC HISTORY:  Diagnoses- depression with anxiety   Suicide Attempts: No   Hospitalizations: No         7/31/2023     1:00 PM 8/28/2023    10:00 AM 10/5/2023     1:00 PM   PHQ   PHQ-9 Total Score 2 0 4   Q9: Thoughts of better off dead/self-harm past 2 weeks Not at all Not at all Not at all       Social History  Housing status: sober housing @ [facility]  Employment status: Employed part time (36 hours) - [employer]   Relationship status: single - ex-SO still has their Lillie bernard  Children: 1 child, 28 YO  Legal: denies  Insurance needs: active  Contact information up to date? yes      Most recent Recovery Clinic visit 8/28/23  A/P last visit:  1. Opioid use disorder, severe, in sustained remission (H)  Stable.  Continue Suboxone, no change.  Has access to Narcan.  Continue [facility] and meetings.  - buprenorphine-naloxone (SUBOXONE) 2-0.5 MG SUBL sublingual tablet; Place 1 tablet under the tongue 2 times daily  Dispense: 60 tablet; Refill: 0     2. Encounter for monitoring opioid maintenance therapy  Will add confirmatory testing today given + benzo on POC UDS.    - Drugs of Abuse Screen Urine (POC CUPS) POCT  - Benzodiazepines, Urine, Quantitative; Future     3. Alcohol use disorder, moderate, dependence (H)  Brief return to use, drank 2 beers.  Working with counselors and treatment program.  Encouraged continued recovery activities.  Will plan to recheck LFTs at follow-up.       4. Nicotine use disorder  Has decreased use but not ready to quit.                 Return in about 4 weeks (around 9/25/2023) for Follow up, in person.  Consider transfer back to [facility] in 1-2 months..    10/5/23 visit:  Pt returns for follow up after completing another residential treatment with [facility] following alcohol use in 8/2023.  Will be returning to [facility] next week.  Staying with sober friends over the weekend.  Not currently with his former partner because she is still using, per pt.    Taking buprenorphine 4mg/day, denies opioid cravings or side effects.  Would like to continue.   Endorses alcohol cravings, anxiety, and difficulty sleeping.  Asks if he could be prescribed Valium 5mg bid for a short period of time.      Labs discussed with patient?  Yes      [place] Prescription Drug Monitoring Program Reviewed:  Yes; as expected  09/30/2023 09/29/2023 4 Buprenorphine-Nalox 2-0.5mg Tb 10.00 5 Li Vol [id number] Gua (1162) 0/0 4.00 mg Comm Ins [place]   08/28/2023 08/28/2023 3 Buprenorphine-Nalox 2-0.5mg Tb 60.00 30 Ka Par [id number] Gua (1162) 0/0 4.00 mg Comm Ins [place]   07/31/2023 07/31/2023 2 Buprenorphine-Nalox 2-0.5mg Tb 60.00 30 He Bat [id number] Timmy (1261) 0/0 4.00 mg Medicaid [place]     Medications:  buprenorphine-naloxone (SUBOXONE) 2-0.5 MG SUBL sublingual tablet, Place 1 tablet under the tongue 2 times daily  naloxone (NARCAN) 4 MG/0.1ML nasal spray, Spray 1 spray (4 mg) into one nostril alternating nostrils as needed for opioid reversal every 2-3 minutes until assistance arrives (Patient not taking: Reported on 6/13/2023)  nicotine (COMMIT) 2 MG lozenge, Place 1 lozenge (2 mg) inside cheek every hour as needed for smoking cessation (Patient not taking: Reported on 6/13/2023)  nicotine (NICODERM CQ) 21 MG/24HR 24 hr patch, Place 1 patch onto the skin daily (Patient not taking: Reported on 6/13/2023)    No current facility-administered medications on file prior to visit.      No Known Allergies    PMH, PSH, FamHx reviewed      OBJECTIVE                                                       BP (!) 151/100   Pulse 89     Physical Exam  Vitals and nursing note reviewed.   Constitutional:       General: He is not in acute distress.     Appearance: Normal appearance. He is not ill-appearing or diaphoretic.   HENT:      Head: Normocephalic and atraumatic.      Mouth/Throat:      Mouth: Mucous membranes are moist.   Eyes:      General: No scleral icterus.  Cardiovascular:      Rate and Rhythm: Normal rate.   Pulmonary:      Effort: Pulmonary effort is normal. No respiratory distress.   Skin:     Coloration: Skin is not jaundiced or pale.   Neurological:      General: No focal deficit present.      Mental Status: He is alert and oriented to person, place, and time.      Gait: Gait normal.   Psychiatric:         Attention and Perception: Attention normal.         Mood and Affect: Mood is anxious. Affect is not inappropriate.         Speech: Speech normal.         Behavior: Behavior normal.         Thought Content: Thought content normal.         Judgment: Judgment normal.         Labs:    UDS:    Lab Results   Component Value Date    BUP Screen Positive (A) 10/05/2023    BZO Negative 10/05/2023    BAR Negative 10/05/2023    SHIRA Negative 10/05/2023    MAMP Negative 10/05/2023    AMP Negative 10/05/2023    MDMA Negative 10/05/2023    MTD Negative 10/05/2023    QXL307 Negative 10/05/2023    OXY Negative 10/05/2023    PCP Negative 10/05/2023    THC Screen Positive (A) 10/05/2023    TEMP 92 F 10/05/2023    SGPOCT 1.025 10/05/2023     *POC urine drug screen does not screen for Fentanyl      Recent Results (from the past 240 hour(s))   Drugs of Abuse Screen Urine (POC CUPS) POCT    Collection Time: 10/05/23  1:57 PM   Result Value Ref Range    POCT Kit Lot Number [id number]     POCT Kit Expiration Date 99035826     Temperature Urine POCT 92 F 90 F, 92 F, 94 F, 96 F, 98 F, 100 F    Specific [place] POCT 1.025 1.005, 1.015, 1.025    pH Qual Urine POCT 5 pH 4 pH, 5 pH, 7 pH, 9 pH    Creatinine Qual Urine POCT 100  mg/dL 20 mg/dL, 50 mg/dL, 100 mg/dL, 200 mg/dL    Internal QC Qual Urine POCT Valid Valid    Amphetamine Qual Urine POCT Negative Negative    Barbiturate Qual Urine POCT Negative Negative    Buprenorphine Qual Urine POCT Screen Positive (A) Negative    Benzodiazepine Qual Urine POCT Negative Negative    Cocaine Qual Urine POCT Negative Negative    Methamphetamine Qual Urine POCT Negative Negative    MDMA Qual Urine POCT Negative Negative    Methadone Qual Urine POCT Negative Negative    Opiate Qual Urine POCT Negative Negative    Oxycodone Qual Urine POCT Negative Negative    Phencyclidine Qual Urine POCT Negative Negative    THC Qual Urine POCT Screen Positive (A) Negative     At least 40min spent in review of medical record,  review, obtaining histories, discussing recommendations, counseling, providing support    Gertrudis Cruz MD  Addiction Medicine  [facility]  [address] [id number] [phone]

## 2023-10-05 NOTE — PROGRESS NOTES
[facility]    Peer  met with Justus RICHEY Jaysonketty in the Recovery Clinic to introduce herself, detail services provided and discuss current status of recovery. Pt appeared alert, oriented and open to feedback during our discussion.     Pt arrives for visit with provider for suboxone.  PRC sees patient today under provider diagnosis of opioid substance disorder, severe, dependence  (H)   Pt arrived with bags of personal belongings.  PRS checked in and asked about his current housing situation.  Pt had a couple of drinks a few weeks ago and come back to his sober living and informed them.  Because of the relapse, Pt had to go back to the higher intensity program.  He completed that successfully so is going back to low intensity but there is a couple days gap for a bed.  He is going to stay with a sober couple that he trusts and he's using a med cab so has no where to store his belongings.     Pt also stated that he is still working at [employer] and they held his job while he was in high intensity treatment.  He says he is looking forward to going back to work and seems to really feel good about the work and his co-workers.     PRS what was going on that may have precipitated the decision to drink.  Steven said things were basically fine.  He was out to eat, met a couple and started talking and they asked if he wanted a beer and as quickly as that, he had one.  He stopped after 2 and told on himself.  PRS let him know that it seemed really hopeful to her, that even though he had a couple of beers, he's behavior is changing.  He was honest and humbly followed directive to start over.  PRS suggested working on a relapse prevention plan so he is prepared while he is still clear headed.      [facility] provided business card to pt welcoming contact for recovery based support and resources. PRC and pt agree to speak again during an upcoming  visit.           Service Type:     Individual      Session Start Time:      1:45                 Session End Time:  2:00      Session Length:     15 min        Patient Goal:   To utilize suboxone assisted treatment for sobriety and long term recovery.     Goal Progress:   Ongoing.    Key Risk Factors to Recovery:   PRC encourages being aware of risk factors that can lead to re-use which include avoiding isolation, avoiding triggers and managing cravings in a healthy manner. being open and willing to acceptance and change on a daily basis.  PRC encourages pt to establish a sober network calling tree to reach out to when needed.  Continue to practice honesty with ourselves and trusted support person(s).   PRC encourages regular attendance at recovery based meetings as well as finding a sponsor for mentoring and accountability.   PRC encourages consideration of other services such as counseling for mental health issues which can correlate with our substance use.      Support Needs:   Ongoing care, support and resources for opioid substance use disorder.     Follow up:   PRC has provided pt with her contact information for support and resource needs.    PRC and pt agree to meet during an upcoming  visit.       [facility]  [address], [address]   [place], [place], [address]  Clinic Phone: [phone]  Clinic Fax: [phone]  Peer  phone: [phone]    Open Monday - Friday  9:00am-4:00pm  Walk in hours: 9am-3pm      Karen Roldan  October 5, 2023  1:59 PM    RAY Sanchez provides clinical oversite and supervision of care.

## 2023-10-27 ENCOUNTER — TELEPHONE (OUTPATIENT)
Dept: BEHAVIORAL HEALTH | Facility: CLINIC | Age: 53
End: 2023-10-27
Payer: COMMERCIAL

## 2023-10-27 NOTE — TELEPHONE ENCOUNTER
Writer attempted to return call to patient, no answer; left VM message asking for a return call to the Recovery Clinic. Unsure as to which medication patient needs a PA on, it does not appear that patient has any outstanding medication orders from recent visit to the . According to the [place]  patient has picked up the medications prescribed on 10/05/23.        Alicia Vidal, RN on 10/27/2023 at 4:29 PM

## 2023-10-27 NOTE — TELEPHONE ENCOUNTER
Reason for call:  Other   Patient called regarding (reason for call): call back  Additional comments: pt is requesting oneil from provider is stating he needs prior Auth on certain medication . Please  cll pt back at [phone]    Phone number to reach patient:  Home number on file [phone] (home)    Best Time:  any    Can we leave a detailed message on this number?  YES    Travel screening: Negative

## 2023-10-30 NOTE — TELEPHONE ENCOUNTER
Writer attempted to return call to patient, no answer; left VM message asking for a return call to the Recovery Clinic.     Alicia Vidal RN on 10/30/2023 at 12:47 PM

## 2023-10-31 NOTE — TELEPHONE ENCOUNTER
Writer spoke with patient who reports he was actually looking for the spelling of one of the providers names when he originally reached out on 10/27/23 and not a PA for medications. Patient reports he has gotten the information he needed, this is for paperwork for the county. Writer reminded patient of his upcoming appointment in the Recovery Clinic on 11/02/23.     Alicia Vidal RN on 10/31/2023 at 11:04 AM

## 2023-11-02 ENCOUNTER — OFFICE VISIT (OUTPATIENT)
Dept: BEHAVIORAL HEALTH | Facility: CLINIC | Age: 53
End: 2023-11-02
Payer: COMMERCIAL

## 2023-11-02 DIAGNOSIS — Z51.81 ENCOUNTER FOR MONITORING OPIOID MAINTENANCE THERAPY: ICD-10-CM

## 2023-11-02 DIAGNOSIS — F17.200 NICOTINE USE DISORDER: ICD-10-CM

## 2023-11-02 DIAGNOSIS — F10.20 ALCOHOL USE DISORDER, MODERATE, DEPENDENCE (H): ICD-10-CM

## 2023-11-02 DIAGNOSIS — Z79.891 ENCOUNTER FOR MONITORING OPIOID MAINTENANCE THERAPY: ICD-10-CM

## 2023-11-02 DIAGNOSIS — F11.21 OPIOID USE DISORDER, SEVERE, IN SUSTAINED REMISSION (H): Primary | ICD-10-CM

## 2023-11-02 PROCEDURE — 99214 OFFICE O/P EST MOD 30 MIN: CPT | Performed by: NURSE PRACTITIONER

## 2023-11-02 RX ORDER — BUPRENORPHINE HYDROCHLORIDE AND NALOXONE HYDROCHLORIDE DIHYDRATE 2; .5 MG/1; MG/1
1 TABLET SUBLINGUAL 2 TIMES DAILY
Qty: 60 TABLET | Refills: 0 | Status: SHIPPED | OUTPATIENT
Start: 2023-11-02 | End: 2023-11-29

## 2023-11-02 ASSESSMENT — COLUMBIA-SUICIDE SEVERITY RATING SCALE - C-SSRS
6. HAVE YOU EVER DONE ANYTHING, STARTED TO DO ANYTHING, OR PREPARED TO DO ANYTHING TO END YOUR LIFE?: NO
TOTAL  NUMBER OF ABORTED OR SELF INTERRUPTED ATTEMPTS SINCE LAST CONTACT: NO
2. HAVE YOU ACTUALLY HAD ANY THOUGHTS OF KILLING YOURSELF?: NO
TOTAL  NUMBER OF INTERRUPTED ATTEMPTS SINCE LAST CONTACT: NO
1. SINCE LAST CONTACT, HAVE YOU WISHED YOU WERE DEAD OR WISHED YOU COULD GO TO SLEEP AND NOT WAKE UP?: NO
SUICIDE, SINCE LAST CONTACT: NO
ATTEMPT SINCE LAST CONTACT: NO

## 2023-11-02 ASSESSMENT — PATIENT HEALTH QUESTIONNAIRE - PHQ9: SUM OF ALL RESPONSES TO PHQ QUESTIONS 1-9: 3

## 2023-11-02 NOTE — NURSING NOTE
RN was notified by rooming staff that the patient had an elevated PHQ-9 score and answered greater than 0 on question 9 indicating thoughts that they would be better off dead, or hurting themself in some way. RN met with patient to complete the C-SSRS.    Patient reports he did not read the PHQ-9 and just circled answers. Patient reports he has never had SI or attempted suicides.    Patient  denies current or recent suicidal ideation or behavior    [place]-Suicide Severity Rating Scale (C-SSRS) score: no risk    RN updated the provider with C-SSRS risk level.     Patient was given the number for the National Suicide Prevention Line (988) along with a list of crisis resources and numbers.      Alicia Vidal RN on 11/2/2023 at 11:26 AM

## 2023-11-02 NOTE — PROGRESS NOTES
[facility] Follow Up    ASSESSMENT/PLAN                                                      1. Opioid use disorder, severe, in sustained remission (H)  Stable in sustained remission, taking suboxone 2 gm BID  Continue suboxone without changes  Encouraged meeting attendance  Confirms narcan access.   - buprenorphine-naloxone (SUBOXONE) 2-0.5 MG SUBL sublingual tablet; Place 1 tablet under the tongue 2 times daily  Dispense: 60 tablet; Refill: 0    2. Alcohol use disorder, moderate, dependence (H)  Intermittent alcohol cravings. Taking gabapentin 100 mg occasionally.   Offered acamprosate today, patient declined.  Encouraged AA attendance.   - Ethyl Glucuronide Screen with Reflex to Confirmation, Urine; Future    3. Encounter for monitoring opioid maintenance therapy  - Drugs of Abuse Screen Urine (POC CUPS) POCT    4. Nicotine use disorder  Smoking and using ENDS. Has NRT available.            Return in about 4 weeks (around 11/30/2023) for Follow up, in person, with me 1100.  Patient counseling completed today:  Discussed mechanism of action, potential risks/benefits/side effects of medications and other recommendations above.     Discussed risk of precipitated withdrawal with initiation of buprenorphine in the presence of full opioid agonists.    Reviewed directions for initiation of buprenorphine to reduce risk of precipitated withdrawal and maximize efficacy.    Harm reduction counseling including never use alone, availability of naloxone, risks associated with concurrent use of opioids and benzodiazepines, alcohol, or other sedatives, safer administration as applicable.  Discussed importance of avoiding isolation, building a network of supportive relationships, avoiding people/places/things associated with past use to reduce risk of relapse; including motivational interviewing regarding psychosocial interventions.   SUBJECTIVE                                                       "  CC/HPI:  Justus Cope is a 53 year old male with PMH neck pain, depression with anxiety, moderate alcohol use disorder, tobacco dependence, and opioid dependence  who presents to the Recovery Clinic for follow up. Pt was referred by Dr. Shimon Arciniega in Addiction medication.      First Recovery Clinic visit:  7/21/22     Brief history of use:   Previously following with Dr. Arciniega in Addiction medicine for ongoing management of OUD and AUD. At initial  visit pt reports he has continue Suboxone despite being \"out\" for 2 months. He had been breaking each tablet into multiple pieces and taking it once per day. He will be a couple days between doses. Denies recent full opioid agonist use. Reports he was taking percocet when he was living in [place]. 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 Reports drinking about 3 beers every other day. His goal is NOT abstinence. He has been previously positive for benzo's in the past. Not prescribed this medication. He has been going to . \"I know my drinking is problematic.\" Detox ([facility]) from 4/10- 4/17, started inpatient at [facility], now outpatient.    Episode of alcohol use 8/2023, returned to residential, re-starting IOP 10/9/23     Substance Use History :  Opioids:    first use: 3/2010 - prescribed oxycodone and morphine for chronic pain  Current use: timing of last use: 2015; substance: prescribed pain meds; route: oral and IN                IV drug use: No   History of overdose: No  Previous treatments : No  Longest period of sobriety: currently   Medical complications related to substance use: denies  Hepatitis C: negative per pt; no recent screening found   HIV: negative per pt; no recent screening found     Other Addiction History:  Stimulants:    denies  Sedatives/hypnotics/anxiolytics:   Past use: denies  Use in last 6 months: denies - UDS positive for BZO on 7/21/22, 3/23/23; " also 8/28/23 but confirmatory negative  Alcohol:   Past use: h/o drinking throughout the day  Use in last 6 months: 2-4 beers every other day. LAST USE 8/2023 - re-entered residential, transfer back to [facility] 10/2023  Nicotine:   Cigarettes: 1 pack daily  Cannabis:   Past use: occasionally  Hallucinogens:   Past use: not for over 25 years  Behavioral addictions:   denies     PAST PSYCHIATRIC HISTORY:  Diagnoses- depression with anxiety   Suicide Attempts: No   Hospitalizations: No       8/28/2023    10:00 AM 10/5/2023     1:00 PM 11/2/2023    11:00 AM   PHQ   PHQ-9 Total Score 0 4 3   Q9: Thoughts of better off dead/self-harm past 2 weeks Not at all Not at all Several days       Social History  Housing status: sober housing @ [facility]  Employment status: Employed part time (36 hours) - [employer]   Relationship status: single - ex-SO still has their dogLillie  Children: 1 child, 26 YO  Legal: denies      Most recent Recovery Clinic visit 10/5/23  .Pt returns for follow up after completing another residential treatment with [facility] following alcohol use in 8/2023.  Will be returning to [facility] next week.  Staying with sober friends over the weekend.  Not currently with his former partner because she is still using, per pt.    Taking buprenorphine 4mg/day, denies opioid cravings or side effects.  Would like to continue.   Endorses alcohol cravings, anxiety, and difficulty sleeping.  Asks if he could be prescribed Valium 5mg bid for a short period of time.      A/P last visit:  1. Opioid use disorder, severe, in sustained remission (H)  Pt reporting control of symptoms   Continue buprenorphine 4mg TDD  Continue programming with [facility]  Pt confirms he has naloxone  - buprenorphine-naloxone (SUBOXONE) 2-0.5 MG SUBL sublingual tablet; Place 1 tablet under the tongue 2 times daily  Dispense: 60 tablet; Refill: 0     2. Alcohol use disorder, moderate, dependence (H)  Pt endorsing anxiety, difficulty sleeping,  and some cravings.   Denies alcohol use since 8/2023.   Requested rx for Valium 5mg bid for short period of time.  Recommended gabapentin vs benzodiazepine  Has taken gabapentin in the past and experienced some dizziness.   Re-trial of gabapentin to address prolonged withdrawal symptoms, starting with low dose and ability to titrate as noted  Continue programming at [facility]  - gabapentin (NEURONTIN) 100 MG capsule; 1-2 po bid prn anxiety and 3-4 po qhs  Dispense: 240 capsule; Refill: 0     3. Nicotine use disorder  Has NRT, states he is not ready to quit at this time.  Continue to monitor     4. Encounter for monitoring opioid maintenance therapy  - Drugs of Abuse Screen Urine (POC CUPS) POCT    11/02/23 visit:  Completed IOP with lodging at [facility] last week   Has been staying at a hotel for the past week. Has plans to move in with some friends, a couple tomorrow.   Plans to return to work at [employer],   He now is planning on  moving into apartment.   Does attend meetings, but has not been to a meeting since leaving treatment.   Taking gabapentin occasionally, does not like to take it.   Reporting intermittent alcohol cravings.     Labs discussed with patient?  Yes      [place] Prescription Drug Monitoring Program Reviewed:  Yes  10/05/2023 10/05/2023 2 Buprenorphine-Nalox 2-0.5mg Tb 60.00 30 Li Vol [id number] Timmy ([id number]) 0/0 4.00 mg Medicaid [place]   10/05/2023 10/05/2023 2 Gabapentin 100 Mg Capsule 240.00 30 Li Vol           Medications:  gabapentin (NEURONTIN) 100 MG capsule, 1-2 po bid prn anxiety and 3-4 po qhs  naloxone (NARCAN) 4 MG/0.1ML nasal spray, Spray 1 spray (4 mg) into one nostril alternating nostrils as needed for opioid reversal every 2-3 minutes until assistance arrives (Patient not taking: Reported on 6/13/2023)  nicotine (COMMIT) 2 MG lozenge, Place 1 lozenge (2 mg) inside cheek every hour as needed for smoking cessation (Patient not taking: Reported on 6/13/2023)  nicotine (NICODERM CQ)  21 MG/24HR 24 hr patch, Place 1 patch onto the skin daily (Patient not taking: Reported on 6/13/2023)    No current facility-administered medications on file prior to visit.      No Known Allergies    PMH, PSH, FamHx reviewed      OBJECTIVE                                                      There were no vitals taken for this visit.    Physical Exam  HENT:      Head: Normocephalic.      Nose: Nose normal.   Eyes:      Conjunctiva/sclera: Conjunctivae normal.   Cardiovascular:      Rate and Rhythm: Normal rate.   Pulmonary:      Effort: Pulmonary effort is normal. No respiratory distress.   Skin:     Coloration: Skin is not jaundiced.   Neurological:      General: No focal deficit present.      Mental Status: He is alert.      Coordination: Coordination is intact.      Gait: Gait is intact.   Psychiatric:         Attention and Perception: Attention normal.         Mood and Affect: Mood normal.         Speech: Speech normal.         Behavior: Behavior is cooperative.         Thought Content: Thought content normal. Thought content does not include suicidal ideation.         Cognition and Memory: Cognition normal.         Judgment: Judgment normal.         Labs:    UDS:    Lab Results   Component Value Date    BUP Screen Positive (A) 10/05/2023    BZO Negative 10/05/2023    BAR Negative 10/05/2023    SHIRA Negative 10/05/2023    MAMP Negative 10/05/2023    AMP Negative 10/05/2023    MDMA Negative 10/05/2023    MTD Negative 10/05/2023    [id number] Negative 10/05/2023    OXY Negative 10/05/2023    PCP Negative 10/05/2023    THC Screen Positive (A) 10/05/2023    TEMP 92 F 10/05/2023    SGPOCT 1.025 10/05/2023     *POC urine drug screen does not screen for Fentanyl      No results found for this or any previous visit (from the past 240 hour(s)).        At least 30 min spent on day of encounter in review of medical record,  review, obtaining histories, discussing recommendations, counseling/coordination of care    Latrice  ROSALBA Cohen, [facility]  [address] [place] [id number]  [phone]

## 2023-11-02 NOTE — NURSING NOTE
[facility]      Rooming information:  Approximate last use of full opioid agonist: over 10 years  Taking buprenorphine? Yes:   How much per day? 2strips  Number of buprenorphine films/tablets remaining currently: 0  Side effects related to buprenorphine (constipation, dry mouth, sedation?) No   Narcan currently available: Yes  Other recent substance use:    Denies  NICOTINE-Yes:   If using nicotine, ready to quit? No     Point of care urine drug screen positive for:  Lab Results   Component Value Date    BUP Screen Positive (A) 10/05/2023    BZO Negative 10/05/2023    BAR Negative 10/05/2023    SHIRA Negative 10/05/2023    MAMP Negative 10/05/2023    AMP Negative 10/05/2023    MDMA Negative 10/05/2023    MTD Negative 10/05/2023    [id number] Negative 10/05/2023    OXY Negative 10/05/2023    PCP Negative 10/05/2023    THC Screen Positive (A) 10/05/2023    TEMP 92 F 10/05/2023    SGPOCT 1.025 10/05/2023       *POC urine drug screen does not screen for Fentanyl          10/5/2023     1:00 PM   PHQ Assesment Total Score(s)   PHQ-9 Score 4       If PHQ-9 score of 15 or higher, has Recovery Clinic therapist or provider been notified? No    Any current suicidal ideation? Yes, answere 1 for question 9, states answered incorrectly, states meant the total be 1 once, no suicial idetion  If yes, has Recovery Clinic therapist or provider been notified? Yes    Primary care provider: Physician No Ref-Primary     Mental health provider: none (follow up on MH referral if needed)    Insurance needs: active    Housing needs: stable- moving    Current legal issues: none    Contact information up to date? yes    3rd Party Involvement not today (please obtain ROBBIE if pt would like to include)    Anne Lea MA  November 2, 2023  11:01 AM

## 2023-11-29 ENCOUNTER — OFFICE VISIT (OUTPATIENT)
Dept: BEHAVIORAL HEALTH | Facility: CLINIC | Age: 53
End: 2023-11-29
Payer: COMMERCIAL

## 2023-11-29 VITALS — DIASTOLIC BLOOD PRESSURE: 69 MMHG | HEART RATE: 112 BPM | SYSTOLIC BLOOD PRESSURE: 102 MMHG

## 2023-11-29 DIAGNOSIS — F10.20 ALCOHOL USE DISORDER, MODERATE, DEPENDENCE (H): ICD-10-CM

## 2023-11-29 DIAGNOSIS — F11.21 OPIOID USE DISORDER, SEVERE, IN SUSTAINED REMISSION (H): Primary | ICD-10-CM

## 2023-11-29 PROCEDURE — 99214 OFFICE O/P EST MOD 30 MIN: CPT | Performed by: NURSE PRACTITIONER

## 2023-11-29 RX ORDER — BUPRENORPHINE HYDROCHLORIDE AND NALOXONE HYDROCHLORIDE DIHYDRATE 2; .5 MG/1; MG/1
1 TABLET SUBLINGUAL 2 TIMES DAILY
Qty: 60 TABLET | Refills: 0 | Status: SHIPPED | OUTPATIENT
Start: 2023-11-29 | End: 2023-12-27

## 2023-11-29 ASSESSMENT — PATIENT HEALTH QUESTIONNAIRE - PHQ9: SUM OF ALL RESPONSES TO PHQ QUESTIONS 1-9: 0

## 2023-11-29 NOTE — PROGRESS NOTES
[facility] Follow Up    ASSESSMENT/PLAN                                                      1. Opioid use disorder, severe, in sustained remission (H)  Controlled with subxoone 4 mg TDD. Denies craving or return to use, report last use of full opioid agonist remains 2015.   Continue suboxone without changes.   Encouraged meeting attendance  - buprenorphine-naloxone (SUBOXONE) 2-0.5 MG SUBL sublingual tablet; Place 1 tablet under the tongue 2 times daily  Dispense: 60 tablet; Refill: 0    2. Alcohol use disorder, moderate, dependence (H)  Returned to use over holiday weekend Denies cravings or urges, states he planned to drink when travelling to [place].   States that he remains motivated to abstain from alcohol. Recommended acamprosate, patient declined stating he does not think he will have difficulty abstaining.   Encouraged AA attendance.     This is second month patient left before completing UDS.      Return in about 1 month (around 12/29/2023) for Follow up, in person walk in.  Patient counseling completed today:  Discussed mechanism of action, potential risks/benefits/side effects of medications and other recommendations above.     Discussed risk of precipitated withdrawal with initiation of buprenorphine in the presence of full opioid agonists.    Reviewed directions for initiation of buprenorphine to reduce risk of precipitated withdrawal and maximize efficacy.    Harm reduction counseling including never use alone, availability of naloxone, risks associated with concurrent use of opioids and benzodiazepines, alcohol, or other sedatives, safer administration as applicable.  Discussed importance of avoiding isolation, building a network of supportive relationships, avoiding people/places/things associated with past use to reduce risk of relapse; including motivational interviewing regarding psychosocial interventions.   SUBJECTIVE                                                       "        CC/HPI:  Justus Cope is a 53 year old male with PMH neck pain, depression with anxiety, moderate alcohol use disorder, tobacco dependence, and opioid dependence  who presents to the Recovery Clinic for follow up. Pt was referred by Dr. Shimon Arciniega in Addiction medication.      First Recovery Clinic visit:  7/21/22   Last date of full opioid agonist use: 2010    Brief history of use:   Previously following with Dr. Arciniega in Addiction medicine for ongoing management of OUD and AUD. At initial  visit pt reports he has continue Suboxone despite being \"out\" for 2 months. He had been breaking each tablet into multiple pieces and taking it once per day. He will be a couple days between doses. Denies recent full opioid agonist use. Reports he was taking percocet when he was living in [place]. 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 Reports drinking about 3 beers every other day. His goal is NOT abstinence. He has been previously positive for benzo's in the past. Not prescribed this medication. He has been going to . \"I know my drinking is problematic.\" Detox ([facility]) from 4/10- 4/17, started inpatient at [facility], now outpatient.    Episode of alcohol use 8/2023, returned to residential, re-starting IOP 10/9/23     Substance Use History :  Opioids:    first use: 3/2010 - prescribed oxycodone and morphine for chronic pain  Current use: timing of last use: 2015; substance: prescribed pain meds; route: oral and IN                IV drug use: No   History of overdose: No  Previous treatments : No  Longest period of sobriety: currently   Medical complications related to substance use: denies  Hepatitis C: negative per pt; no recent screening found   HIV: negative per pt; no recent screening found     Other Addiction History:  Stimulants:    denies  Sedatives/hypnotics/anxiolytics:   Past use: denies  Use in last 6 months: " denies - UDS positive for BZO on 7/21/22, 3/23/23; also 8/28/23 but confirmatory negative  Alcohol:   Past use: h/o drinking throughout the day  Use in last 6 months: 2-4 beers every other day. LAST USE 8/2023 - re-entered residential, transfer back to [facility] 10/2023  Nicotine:   Cigarettes: 1 pack daily  Cannabis:   Past use: occasionally  Hallucinogens:   Past use: not for over 25 years  Behavioral addictions:   denies     PAST PSYCHIATRIC HISTORY:  Diagnoses- depression with anxiety   Suicide Attempts: No   Hospitalizations: No               10/5/2023     1:00 PM 11/2/2023    11:00 AM 11/29/2023     1:00 PM   PHQ   PHQ-9 Total Score 4 3 0   Q9: Thoughts of better off dead/self-harm past 2 weeks Not at all Several days Not at all       Social History  Housing status: sober housing @ [facility]  Employment status: Employed part time (36 hours) - [employer]   Relationship status: single - ex-SO still has their dogLillie  Children: 1 child, 28 YO  Legal: denies         Most recent Recovery Clinic visit 11/2/23  .Completed IOP with lodging at [facility] last week   Has been staying at a hotel for the past week. Has plans to move in with some friends, a couple tomorrow.   Plans to return to work at [employer],   He now is planning on  moving into apartment.   Does attend meetings, but has not been to a meeting since leaving treatment.   Taking gabapentin occasionally, does not like to take it.   Reporting intermittent alcohol cravings.     A/P last visit:     1. Opioid use disorder, severe, in sustained remission (H)  Stable in sustained remission, taking suboxone 2 gm BID  Continue suboxone without changes  Encouraged meeting attendance  Confirms narcan access.   - buprenorphine-naloxone (SUBOXONE) 2-0.5 MG SUBL sublingual tablet; Place 1 tablet under the tongue 2 times daily  Dispense: 60 tablet; Refill: 0     2. Alcohol use disorder, moderate, dependence (H)  Intermittent alcohol cravings. Taking  gabapentin 100 mg occasionally.   Offered acamprosate today, patient declined.  Encouraged AA attendance.   - Ethyl Glucuronide Screen with Reflex to Confirmation, Urine; Future     3. Encounter for monitoring opioid maintenance therapy  - Drugs of Abuse Screen Urine (POC CUPS) POCT     4. Nicotine use disorder  Smoking and using ENDS. Has NRT available.        11/29/23 visit:  Travelled to [place] for [place] and left his suboxone up there. Has not taken in the last 3 days and is in withdrawal.   Staying with his friend [place].   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   Drank over Thanksgiving a fair amount of beer, it was his first time drinking since he left treatment. Does not want to return to daily use, does not think he will have difficulty abstaining despite room mates nightly use of alcohol.   He going to his AA meetings    Labs discussed with patient?  No      [place] Prescription Drug Monitoring Program Reviewed:  Yes  11/02/2023 11/02/2023 2 Buprenorphine-Nalox 2-0.5mg Tb 60.00 30  Bat [id number] Timmy ([id number]) 0/0 4.00 mg Medicaid [place]   10/05/2023 10/05/2023 2 Gabapentin 100 Mg Capsule 240.00 30 Li Vol [id number] Timmy ([id number]) 0/0  [place]icaid [place]   10/05/2023 10/05/2023 2 Buprenorphine-Nalox 2-0.5mg Tb 60.00 30  Vol [id number] Timmy ([id number])         Medications:  gabapentin (NEURONTIN) 100 MG capsule, 1-2 po bid prn anxiety and 3-4 po qhs  naloxone (NARCAN) 4 MG/0.1ML nasal spray, Spray 1 spray (4 mg) into one nostril alternating nostrils as needed for opioid reversal every 2-3 minutes until assistance arrives (Patient not taking: Reported on 6/13/2023)  nicotine (COMMIT) 2 MG lozenge, Place 1 lozenge (2 mg) inside cheek every hour as needed for smoking cessation (Patient not taking: Reported on 6/13/2023)  nicotine (NICODERM CQ) 21 MG/24HR 24 hr patch, Place 1 patch onto the skin daily (Patient not taking: Reported on 6/13/2023)    No current facility-administered medications on file prior to  visit.      No Known Allergies    PMH, PSH, FamHx reviewed      OBJECTIVE                                                      /69   Pulse 112     Physical Exam  HENT:      Head: Normocephalic.      Nose: Nose normal.   Eyes:      Conjunctiva/sclera: Conjunctivae normal.   Cardiovascular:      Rate and Rhythm: Tachycardia present.   Pulmonary:      Effort: Pulmonary effort is normal. No respiratory distress.   Neurological:      General: No focal deficit present.      Mental Status: He is alert and oriented to person, place, and time.   Psychiatric:         Attention and Perception: Attention normal.         Mood and Affect: Mood normal.         Speech: Speech normal.         Behavior: Behavior is cooperative.         Thought Content: Thought content normal. Thought content does not include suicidal ideation.         Cognition and Memory: Cognition normal.         Judgment: Judgment normal.         Labs:    UDS:    Lab Results   Component Value Date    BUP Screen Positive (A) 10/05/2023    BZO Negative 10/05/2023    BAR Negative 10/05/2023    SHIRA Negative 10/05/2023    MAMP Negative 10/05/2023    AMP Negative 10/05/2023    MDMA Negative 10/05/2023    MTD Negative 10/05/2023    [id number] Negative 10/05/2023    OXY Negative 10/05/2023    PCP Negative 10/05/2023    THC Screen Positive (A) 10/05/2023    TEMP 92 F 10/05/2023    SGPOCT 1.025 10/05/2023     *POC urine drug screen does not screen for Fentanyl      No results found for this or any previous visit (from the past 240 hour(s)).            Latrice Cohen, [place] [id number] [address] [id number] [phone]

## 2023-11-29 NOTE — NURSING NOTE
[facility]  Pt States hasn't had suboxone for approx 3 days, Left remaing [place] from Holiday visit and doesn't feel well.    Rooming information:  Approximate last use of full opioid agonist: 10 years ago approx  Taking buprenorphine? Yes:   How much per day? 2 strips  Number of buprenorphine films/tablets remaining currently: 0, left rest of suboxone [place]  Side effects related to buprenorphine (constipation, dry mouth, sedation?) No   Narcan currently available: Yes  Other recent substance use:    Denies  NICOTINE-Yes:   If using nicotine, ready to quit? No    Point of care urine drug screen positive for:  Lab Results   Component Value Date    BUP Screen Positive (A) 10/05/2023    BZO Negative 10/05/2023    BAR Negative 10/05/2023    SHIRA Negative 10/05/2023    MAMP Negative 10/05/2023    AMP Negative 10/05/2023    MDMA Negative 10/05/2023    MTD Negative 10/05/2023    [id number] Negative 10/05/2023    OXY Negative 10/05/2023    PCP Negative 10/05/2023    THC Screen Positive (A) 10/05/2023    TEMP 92 F 10/05/2023    SGPOCT 1.025 10/05/2023       *POC urine drug screen does not screen for Fentanyl          11/2/2023    11:00 AM   PHQ Assesment Total Score(s)   PHQ-9 Score 3       If PHQ-9 score of 15 or higher, has Recovery Clinic therapist or provider been notified? No    Any current suicidal ideation? No  If yes, has Recovery Clinic therapist or provider been notified? No    Primary care provider: Physician No Ref-Primary     Mental health provider: None (follow up on MH referral if needed)    Insurance needs: active    Housing needs: stable    Current legal issues: stable, moving    Contact information up to date? yes    3rd Party Involvement not today (please obtain ROBBIE if pt would like to include)    Anne Lea MA  November 29, 2023  1:34 PM

## 2023-12-27 ENCOUNTER — OFFICE VISIT (OUTPATIENT)
Dept: BEHAVIORAL HEALTH | Facility: CLINIC | Age: 53
End: 2023-12-27
Payer: COMMERCIAL

## 2023-12-27 VITALS — DIASTOLIC BLOOD PRESSURE: 104 MMHG | HEART RATE: 106 BPM | SYSTOLIC BLOOD PRESSURE: 156 MMHG

## 2023-12-27 DIAGNOSIS — F11.90 OPIOID USE DISORDER: Primary | ICD-10-CM

## 2023-12-27 DIAGNOSIS — F10.20 ALCOHOL USE DISORDER, MODERATE, DEPENDENCE (H): ICD-10-CM

## 2023-12-27 DIAGNOSIS — Z51.81 ENCOUNTER FOR MONITORING OPIOID MAINTENANCE THERAPY: ICD-10-CM

## 2023-12-27 DIAGNOSIS — Z79.891 ENCOUNTER FOR MONITORING OPIOID MAINTENANCE THERAPY: ICD-10-CM

## 2023-12-27 DIAGNOSIS — F17.200 NICOTINE USE DISORDER: ICD-10-CM

## 2023-12-27 LAB
AMPHETAMINE QUAL URINE POCT: NEGATIVE
BARBITURATE QUAL URINE POCT: NEGATIVE
BENZODIAZEPINE QUAL URINE POCT: NEGATIVE
BUPRENORPHINE QUAL URINE POCT: ABNORMAL
COCAINE QUAL URINE POCT: NEGATIVE
CREATININE QUAL URINE POCT: ABNORMAL
INTERNAL QC QUAL URINE POCT: ABNORMAL
MDMA QUAL URINE POCT: NEGATIVE
METHADONE QUAL URINE POCT: NEGATIVE
METHAMPHETAMINE QUAL URINE POCT: NEGATIVE
OPIATE QUAL URINE POCT: NEGATIVE
OXYCODONE QUAL URINE POCT: NEGATIVE
PH QUAL URINE POCT: ABNORMAL
PHENCYCLIDINE QUAL URINE POCT: NEGATIVE
POCT KIT EXPIRATION DATE: ABNORMAL
POCT KIT LOT NUMBER: ABNORMAL
SPECIFIC GRAVITY POCT: 1
TEMPERATURE URINE POCT: ABNORMAL
THC QUAL URINE POCT: NEGATIVE

## 2023-12-27 PROCEDURE — 99000 SPECIMEN HANDLING OFFICE-LAB: CPT | Performed by: FAMILY MEDICINE

## 2023-12-27 PROCEDURE — G0480 DRUG TEST DEF 1-7 CLASSES: HCPCS | Mod: 90 | Performed by: FAMILY MEDICINE

## 2023-12-27 PROCEDURE — 80305 DRUG TEST PRSMV DIR OPT OBS: CPT | Mod: XE | Performed by: FAMILY MEDICINE

## 2023-12-27 PROCEDURE — 80307 DRUG TEST PRSMV CHEM ANLYZR: CPT | Mod: 90 | Performed by: FAMILY MEDICINE

## 2023-12-27 PROCEDURE — 99214 OFFICE O/P EST MOD 30 MIN: CPT | Performed by: FAMILY MEDICINE

## 2023-12-27 RX ORDER — BUPRENORPHINE HYDROCHLORIDE AND NALOXONE HYDROCHLORIDE DIHYDRATE 2; .5 MG/1; MG/1
1 TABLET SUBLINGUAL 2 TIMES DAILY
Qty: 60 TABLET | Refills: 0 | Status: SHIPPED | OUTPATIENT
Start: 2023-12-27 | End: 2024-01-23

## 2023-12-27 ASSESSMENT — PATIENT HEALTH QUESTIONNAIRE - PHQ9: SUM OF ALL RESPONSES TO PHQ QUESTIONS 1-9: 0

## 2023-12-27 NOTE — PROGRESS NOTES
[facility] Follow Up    ASSESSMENT/PLAN                                                    1. Opioid use disorder  Controlled, in sustained remission  Continue buprenorphine 4mg/day  Additional naloxone prescribed  - buprenorphine-naloxone (SUBOXONE) 2-0.5 MG SUBL sublingual tablet; Place 1 tablet under the tongue 2 times daily  Dispense: 60 tablet; Refill: 0  - naloxone (NARCAN) 4 MG/0.1ML nasal spray; Spray 1 spray (4 mg) into one nostril alternating nostrils as needed for opioid reversal every 2-3 minutes until assistance arrives  Dispense: 0.2 mL; Refill: 1    2. Encounter for monitoring opioid maintenance therapy  - Drugs of Abuse Screen Urine (POC CUPS) POCT  - Buprenorphine & Metabolite Screen; Future  - Buprenorphine & Metabolite Screen    3. Alcohol use disorder, moderate, dependence (H)  Pt reporting heavy alcohol use 12/24-12/26/23.  Denies daily use, denies cravings.  Did not find even low dose gabapentin helpful due to side effects.   Reviewed risks of concurrent alcohol and opioid use.   - Ethyl Glucuronide Screen with Reflex to Confirmation, Urine; Future  - Ethyl Glucuronide Screen with Reflex to Confirmation, Urine    4. Nicotine use disorder  Pt reports decreased cigarette use as he vapes nicotine more, reviewed harm reduction in this approach.  Encouraged continued reduction in cigarette use.      Return in about 4 weeks (around 1/24/2024) for Follow up, in person.  Patient counseling completed today:  Discussed mechanism of action, potential risks/benefits/side effects of medications and other recommendations above.      Harm reduction counseling including never use alone, availability of naloxone, risks associated with concurrent use of opioids and benzodiazepines, alcohol, or other sedatives, safer administration as applicable.  Discussed importance of avoiding isolation, building a network of supportive relationships, avoiding people/places/things associated with past  "use to reduce risk of relapse; including motivational interviewing regarding psychosocial interventions.   SUBJECTIVE                                                        CC/HPI:  Justus Cope is a 53 year old male with PMH neck pain, depression with anxiety, moderate alcohol use disorder, tobacco dependence, and opioid dependence  who presents to the Recovery Clinic for follow up. Pt was referred by Dr. Shimon Arciniega in Addiction medication.      First Recovery Clinic visit:  7/21/22   Last date of full opioid agonist use: 2015    Brief history of use:   Previously following with Dr. Arciniega in Addiction medicine for ongoing management of OUD and AUD. At initial  visit pt reports he has continue Suboxone despite being \"out\" for 2 months. He had been breaking each tablet into multiple pieces and taking it once per day. He will be a couple days between doses. Denies recent full opioid agonist use. Reports he was taking percocet when he was living in [place]. 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 Reports drinking about 3 beers every other day. His goal is NOT abstinence. He has been previously positive for benzo's in the past. Not prescribed this medication. He has been going to AA. \"I know my drinking is problematic.\" Detox (ETOH) from 4/10- 4/17, started inpatient at [facility] which he completed, followed by outpatient through 8/2023.   Episode of alcohol use 8/2023, returned to residential, re-starting IOP 10/9/23  [facility]. Also drank around [place] 12/24-12/26.    12/27/23 visit:  Pt reports he had been taking buprenorphine 4mg/day as prescribed until running out appropriately.  Was not able to come to clinic until today.   Last dose of buprenorphine 12/25/23.  Endorses mild withdrawal symptoms.  Denies return to use of full opioid agonists.   States he did drink " alcohol heavily with friends 12/24-12/26/23.  States he is not drinking daily, denies cravings on days he is not drinking.  Not taking gabapentin due to this making him feel dizzy.    Not using NRT, but has been smoking fewer cigarettes as he has been vaping more.    Still working 2 jobs, enjoys being busy.       Substance Use History :  Opioids:    first use: 3/2010 - prescribed oxycodone and morphine for chronic pain  Current use: timing of last use: 2015; substance: prescribed pain meds; route: oral and IN                IV drug use: No   History of overdose: No  Previous treatments : No  Longest period of sobriety: currently   Medical complications related to substance use: denies  Hepatitis C: negative per pt; no recent screening found   HIV: negative per pt; no recent screening found     Other Addiction History:  Stimulants:    denies  Sedatives/hypnotics/anxiolytics:   Past use: denies  Use in last 6 months: denies - UDS positive for BZO on 7/21/22, 3/23/23; also 8/28/23 but confirmatory negative  Alcohol:   Past use: h/o drinking throughout the day  Use in last 6 months: 2-4 beers every other day. LAST USE 8/2023 - re-entered residential, transfer back to [facility] 10/2023  Nicotine:   Cigarettes: 1 pack daily  Cannabis:   Past use: occasionally  Hallucinogens:   Past use: not for over 25 years  Behavioral addictions:   denies     PAST PSYCHIATRIC HISTORY:  Diagnoses- depression with anxiety   Suicide Attempts: No   Hospitalizations: No               11/2/2023    11:00 AM 11/29/2023     1:00 PM 12/27/2023     1:00 PM   PHQ   PHQ-9 Total Score 3 0 0   Q9: Thoughts of better off dead/self-harm past 2 weeks Several days Not at all Not at all       Social History  Housing status:apartment  Employment status: 2 jobs, one as PCA the other retail  Relationship status: single - ex-SO still has their dogLillie  Children: 1 child, 28 YO  Legal: denies     Labs discussed with patient?  No      [place] Prescription Drug  Monitoring Program Reviewed:  Yes  11/29/2023 11/29/2023 2 Buprenorphine-Nalox 2-0.5mg Tb 60.00 30 He Bat [id number] Timmy ([id number]) 0/0 4.00 mg Medicaid [place]   11/02/2023 11/02/2023 2 Buprenorphine-Nalox 2-0.5mg Tb 60.00 30 He Bat [id number] Timmy ([id number]) 0/0 4.00 mg Medicaid [place]   10/05/2023 10/05/2023 2 Gabapentin 100 Mg Capsule 240.00 30 Li Vol [id number] Timmy ([id number]) 0/0  Medicaid [place]     Medications:  buprenorphine-naloxone (SUBOXONE) 2-0.5 MG SUBL sublingual tablet, Place 1 tablet under the tongue 2 times daily  gabapentin (NEURONTIN) 100 MG capsule, 1-2 po bid prn anxiety and 3-4 po qhs (Patient not taking: Reported on 12/27/2023)  naloxone (NARCAN) 4 MG/0.1ML nasal spray, Spray 1 spray (4 mg) into one nostril alternating nostrils as needed for opioid reversal every 2-3 minutes until assistance arrives (Patient not taking: Reported on 6/13/2023)  nicotine (COMMIT) 2 MG lozenge, Place 1 lozenge (2 mg) inside cheek every hour as needed for smoking cessation (Patient not taking: Reported on 6/13/2023)  nicotine (NICODERM CQ) 21 MG/24HR 24 hr patch, Place 1 patch onto the skin daily (Patient not taking: Reported on 6/13/2023)    No current facility-administered medications on file prior to visit.      No Known Allergies    PMH, PSH, FamHx reviewed      OBJECTIVE                                                      BP (!) 156/104 (BP Location: Right arm, Patient Position: Sitting, Cuff Size: Adult Regular)   Pulse 106     Physical Exam  HENT:      Head: Normocephalic.      Nose: Nose normal.   Eyes:      Conjunctiva/sclera: Conjunctivae normal.   Cardiovascular:      Rate and Rhythm: Tachycardia present.   Pulmonary:      Effort: Pulmonary effort is normal. No respiratory distress.   Neurological:      General: No focal deficit present.      Mental Status: He is alert and oriented to person, place, and time.   Psychiatric:         Attention and Perception: Attention normal.         Mood and Affect: Mood normal.         Speech: Speech  normal.         Behavior: Behavior is cooperative.         Thought Content: Thought content normal. Thought content does not include suicidal ideation.         Cognition and Memory: Cognition normal.         Judgment: Judgment normal.         Labs:    UDS:    Lab Results   Component Value Date    BUP Screen Positive (A) 12/27/2023    BZO Negative 12/27/2023    BAR Negative 12/27/2023    SHIRA Negative 12/27/2023    MAMP Negative 12/27/2023    AMP Negative 12/27/2023    MDMA Negative 12/27/2023    MTD Negative 12/27/2023    [id number] Negative 12/27/2023    OXY Negative 12/27/2023    PCP Negative 12/27/2023    THC Negative 12/27/2023    TEMP 90 F 12/27/2023    SGPOCT 1.005 12/27/2023     *POC urine drug screen does not screen for Fentanyl      Recent Results (from the past 240 hour(s))   Drugs of Abuse Screen Urine (POC CUPS) POCT    Collection Time: 12/27/23  1:07 PM   Result Value Ref Range    POCT Kit Lot Number [id number]     POCT Kit Expiration Date 2025-08-22     Temperature Urine POCT 90 F 90 F, 92 F, 94 F, 96 F, 98 F, 100 F    Specific [place] POCT 1.005 1.005, 1.015, 1.025    pH Qual Urine POCT 7 pH 4 pH, 5 pH, 7 pH, 9 pH    Creatinine Qual Urine POCT 100 mg/dL 20 mg/dL, 50 mg/dL, 100 mg/dL, 200 mg/dL    Internal QC Qual Urine POCT Valid Valid    Amphetamine Qual Urine POCT Negative Negative    Barbiturate Qual Urine POCT Negative Negative    Buprenorphine Qual Urine POCT Screen Positive (A) Negative    Benzodiazepine Qual Urine POCT Negative Negative    Cocaine Qual Urine POCT Negative Negative    Methamphetamine Qual Urine POCT Negative Negative    MDMA Qual Urine POCT Negative Negative    Methadone Qual Urine POCT Negative Negative    Opiate Qual Urine POCT Negative Negative    Oxycodone Qual Urine POCT Negative Negative    Phencyclidine Qual Urine POCT Negative Negative    THC Qual Urine POCT Negative Negative             Gertrudis Cruz MD  Addiction Medicine  [facility]  [address]  [place] [address]  [phone]

## 2023-12-27 NOTE — NURSING NOTE
[facility]      Rooming information: last day taking suboxone was Sunday 12/24/2023  Approximate last use of full opioid agonist: about 10 years ago  Taking buprenorphine? No  How much per day? N/A  Number of buprenorphine films/tablets remaining currently: none  Side effects related to buprenorphine (constipation, dry mouth, sedation?) No   Narcan currently available: Yes  Other recent substance use:    Denies  NICOTINE-Yes: cigarettes  If using nicotine, ready to quit? No    Point of care urine drug screen positive for:  Lab Results   Component Value Date    BUP Screen Positive (A) 12/27/2023    BZO Negative 12/27/2023    BAR Negative 12/27/2023    SHIRA Negative 12/27/2023    MAMP Negative 12/27/2023    AMP Negative 12/27/2023    MDMA Negative 12/27/2023    MTD Negative 12/27/2023    [id number] Negative 12/27/2023    OXY Negative 12/27/2023    PCP Negative 12/27/2023    THC Negative 12/27/2023    TEMP 90 F 12/27/2023    SGPOCT 1.005 12/27/2023       *POC urine drug screen does not screen for Fentanyl      12/27/2023     1:00 PM   PHQ Assesment Total Score(s)   PHQ-9 Score 0       If PHQ-9 score of 15 or higher, has Recovery Clinic therapist or provider been notified? No    Any current suicidal ideation? No  If yes, has Recovery Clinic therapist or provider been notified? N/A    Primary care provider: Physician No Ref-Primary     Mental health provider: none (follow up on MH referral if needed)    Insurance needs: Active    Housing needs: Stable    Current legal issues: none    Contact information up to date? yes    3rd Party Involvement  (please obtain ROBBIE if pt would like to include)    Jarocho Soriano MA  December 27, 2023  1:04 PM

## 2023-12-28 LAB — ETHYL GLUCURONIDE UR QL SCN: NORMAL NG/ML

## 2023-12-29 LAB
ETHYL GLUCURONIDE UR CFM-MCNC: NORMAL NG/ML
ETHYL SULFATE UR CFM-MCNC: NORMAL NG/ML

## 2023-12-30 LAB
BUPRENORPHINE UR-MCNC: 18 NG/ML
BUPRENORPHINE UR-MCNC: <2 NG/ML
NALOXONE UR CFM-MCNC: <100 NG/ML
NORBUPRENORPHINE UR CFM-MCNC: 80 NG/ML
NORBUPRENORPHINE UR-MCNC: 16 NG/ML

## 2024-01-23 ENCOUNTER — OFFICE VISIT (OUTPATIENT)
Dept: BEHAVIORAL HEALTH | Facility: CLINIC | Age: 54
End: 2024-01-23
Payer: COMMERCIAL

## 2024-01-23 ENCOUNTER — VIRTUAL VISIT (OUTPATIENT)
Dept: BEHAVIORAL HEALTH | Facility: CLINIC | Age: 54
End: 2024-01-23
Payer: COMMERCIAL

## 2024-01-23 VITALS — DIASTOLIC BLOOD PRESSURE: 109 MMHG | HEART RATE: 108 BPM | SYSTOLIC BLOOD PRESSURE: 159 MMHG

## 2024-01-23 DIAGNOSIS — F11.90 OPIOID USE DISORDER: Primary | ICD-10-CM

## 2024-01-23 DIAGNOSIS — R69 DIAGNOSIS DEFERRED: Primary | ICD-10-CM

## 2024-01-23 DIAGNOSIS — Z51.81 ENCOUNTER FOR MONITORING OPIOID MAINTENANCE THERAPY: ICD-10-CM

## 2024-01-23 DIAGNOSIS — Z79.891 ENCOUNTER FOR MONITORING OPIOID MAINTENANCE THERAPY: ICD-10-CM

## 2024-01-23 PROCEDURE — 99207 PR NO CHARGE LOS: CPT

## 2024-01-23 PROCEDURE — 80305 DRUG TEST PRSMV DIR OPT OBS: CPT | Performed by: FAMILY MEDICINE

## 2024-01-23 PROCEDURE — 99213 OFFICE O/P EST LOW 20 MIN: CPT | Mod: 95 | Performed by: FAMILY MEDICINE

## 2024-01-23 RX ORDER — BUPRENORPHINE HYDROCHLORIDE AND NALOXONE HYDROCHLORIDE DIHYDRATE 2; .5 MG/1; MG/1
1 TABLET SUBLINGUAL 2 TIMES DAILY
Start: 2024-01-23 | End: 2024-02-27

## 2024-01-23 ASSESSMENT — PATIENT HEALTH QUESTIONNAIRE - PHQ9: SUM OF ALL RESPONSES TO PHQ QUESTIONS 1-9: 0

## 2024-01-23 NOTE — PROGRESS NOTES
[facility]    Peer  met with Justus RICHEY Anatoliy in the Recovery Clinic to introduce herself, detail services provided and discuss current status of recovery. Pt appeared alert, oriented and open to feedback during our discussion.     Pt arrives for visit with provider for suboxone.  SCARLETT sees patient today under provider diagnosis of opioid substance disorder, severe, dependence  (H)   Checked in,  Pt still seems to be very stable and working hard to stay that way.  Pt seems to have a good understanding around the work that needs to do and the consequences of not doing the work    SCARLETT provided business card to pt welcoming contact for recovery based support and resources. PRC and pt agree to speak again during an upcoming  visit.           Service Type:     Individual     Session Start Time:    11:00                   Session End Time:    11:15    Session Length:      15 min       Patient Goal:   To utilize suboxone assisted treatment for sobriety and long term recovery.     Goal Progress:   Ongoing.    Key Risk Factors to Recovery:   SCARLETT encourages being aware of risk factors that can lead to re-use which include avoiding isolation, avoiding triggers and managing cravings in a healthy manner. being open and willing to acceptance and change on a daily basis.  SCARLETT encourages pt to establish a sober network calling tree to reach out to when needed.  Continue to practice honesty with ourselves and trusted support person(s).   SCARLETT encourages regular attendance at recovery based meetings as well as finding a sponsor for mentoring and accountability.   SCARLETT encourages consideration of other services such as counseling for mental health issues which can correlate with our substance use.      Support Needs:   Ongoing care, support and resources for opioid substance use disorder.     Follow up:   SCARLETT has provided pt with her contact information for support and resource needs.    SCARLETT  and pt agree to meet during an upcoming  visit.       [facility]  [address], [address]   [place], [place], [address]  Clinic Phone: [phone]  Clinic Fax: [phone]  Peer  phone: [phone]    Open Monday - Friday  9:00am-4:00pm  Walk in hours: 9am-3pm      Karen Roldan  January 23, 2024  4:42 PM    RAY Sanchez provides clinical oversite and supervision of care.

## 2024-01-23 NOTE — NURSING NOTE
[facility]      Rooming information:  Approximate last use of full opioid agonist: about 10 years ago  Taking buprenorphine? Yes:   How much per day? 5 mg   Number of buprenorphine films/tablets remaining currently: none  Side effects related to buprenorphine (constipation, dry mouth, sedation?) No   Narcan currently available: Yes  Other recent substance use:    Denies  NICOTINE-Yes: Cigarettes   If using nicotine, ready to quit? No    Point of care urine drug screen positive for:  Lab Results   Component Value Date    BUP Screen Positive (A) 01/23/2024    BZO Negative 01/23/2024    BAR Negative 01/23/2024    SHIRA Negative 01/23/2024    MAMP Negative 01/23/2024    AMP Negative 01/23/2024    MDMA Negative 01/23/2024    MTD Negative 01/23/2024    [id number] Negative 01/23/2024    OXY Negative 01/23/2024    PCP Negative 01/23/2024    THC Negative 01/23/2024    TEMP Invalid (A) 01/23/2024    SGPOCT 1.025 01/23/2024       *POC urine drug screen does not screen for Fentanyl        1/23/2024    10:00 AM   PHQ Assesment Total Score(s)   PHQ-9 Score 0       If PHQ-9 score of 15 or higher, has Recovery Clinic therapist or provider been notified? No    Any current suicidal ideation? No  If yes, has Recovery Clinic therapist or provider been notified? N/A    Primary care provider: Physician No Ref-Primary     Mental health provider: none (follow up on MH referral if needed)    Insurance needs: Active    Housing needs: Stable    Current legal issues: None    Contact information up to date? Yes    3rd Party Involvement  (please obtain ROBBIE if pt would like to include)    Jarocho Soriano MA  January 23, 2024  10:29 AM

## 2024-01-24 NOTE — PROGRESS NOTES
"[facility] - Recovery Clinic Follow Up    ASSESSMENT/PLAN                                                    1. Opioid use disorder  Controlled.  In sustained remission.   Continue buprenorphine 4mg TDD  - buprenorphine-naloxone (SUBOXONE) 2-0.5 MG SUBL sublingual tablet; Place 1 tablet under the tongue 2 times daily    2. Encounter for monitoring opioid maintenance therapy  - Drugs of Abuse Screen Urine (POC CUPS) POCT      Return in about 2 months (around 3/23/2024).  Patient counseling completed today:  Discussed mechanism of action, potential risks/benefits/side effects of medications and other recommendations above.      Harm reduction counseling including never use alone, availability of naloxone, risks associated with concurrent use of opioids and benzodiazepines, alcohol, or other sedatives, safer administration as applicable.  Discussed importance of avoiding isolation, building a network of supportive relationships, avoiding people/places/things associated with past use to reduce risk of relapse; including motivational interviewing regarding psychosocial interventions.   SUBJECTIVE                                                      Visit was conducted using secure video platform.  Pt was located in  office, I was located in home office.  Start time 1107, end time 1126.     CC/HPI:  Justus Cope is a 53 year old male with PMH neck pain, depression with anxiety, moderate alcohol use disorder, tobacco dependence, and opioid use disorder on buprenorphine  who presents to the Recovery Clinic for follow up. Pt was referred by Dr. Shimon Arciniega in Addiction Medicine.      First Recovery Clinic visit:  7/21/22   Last date of full opioid agonist use: 2015    Brief history of use:   Previously following with Dr. Arciniega in Addiction medicine for ongoing management of OUD and AUD. At initial  visit pt reports he has continue Suboxone despite being \"out\" for 2 months. He had been breaking each tablet " "into multiple pieces and taking it once per day. He will be a couple days between doses. Denies recent full opioid agonist use. Reports he was taking percocet when he was living in [place]. 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 Reports drinking about 3 beers every other day. His goal is NOT abstinence. He has been previously positive for benzo's in the past. Not prescribed this medication. He has been going to . \"I know my drinking is problematic.\" Detox (ETOH) from 4/10- 4/17, started inpatient at [facility] which he completed, followed by outpatient through 8/2023.   Episode of alcohol use 8/2023, returned to residential, re-starting IOP 10/9/23  Episode of use 11/2023 around [facility] for a couple of days. Also drank around [place] 12/24-12/26.    1/23/24 visit:  Pt reports he had been taking buprenorphine 4mg/day as prescribed.  Denies opioid cravings or return to use.  No significant side effects.   Has stopped vaping because he found he was consuming more nicotine with this.  Smoking fewer cigarettes, not ready to quit entirely.       Substance Use History :  Opioids:    first use: 3/2010 - prescribed oxycodone and morphine for chronic pain  Current use: timing of last use: 2015; substance: prescribed pain meds; route: oral and IN                IV drug use: No   History of overdose: No  Previous treatments : No  Longest period of sobriety: currently   Medical complications related to substance use: denies  Hepatitis C: negative per pt; no recent screening found   HIV: negative per pt; no recent screening found     Other Addiction History:  Stimulants:    denies  Sedatives/hypnotics/anxiolytics:   Past use: denies  Use in last 6 months: denies - UDS positive for BZO on 7/21/22, 3/23/23; also 8/28/23 but confirmatory negative  Alcohol:   H/o drinking throughout the day, most recent use described as 2-4 beers/day. "   Nicotine:   Cigarettes: 1 pack daily  Cannabis:   Occasionally in past  Hallucinogens:   not for over 25 years  Behavioral addictions:   denies     PAST PSYCHIATRIC HISTORY:  Diagnoses- depression with anxiety   Suicide Attempts: No   Hospitalizations: No               11/29/2023     1:00 PM 12/27/2023     1:00 PM 1/23/2024    10:00 AM   PHQ   PHQ-9 Total Score 0 0 0   Q9: Thoughts of better off dead/self-harm past 2 weeks Not at all Not at all Not at all       Social History  Housing status:apartment  Employment status: 2 jobs, one as PCA the other retail  Relationship status: single - ex-SO still has their Lillie bernard  Children: 1 child, 26 YO  Legal: denies     Labs discussed with patient?  No      [place] Prescription Drug Monitoring Program Reviewed:  Yes  12/27/2023 12/27/2023 2 Buprenorphine-Nalox 2-0.5mg Tb 60.00 30  Vol [id number] Timmy (13     Medications:  naloxone (NARCAN) 4 MG/0.1ML nasal spray, Spray 1 spray (4 mg) into one nostril alternating nostrils as needed for opioid reversal every 2-3 minutes until assistance arrives    No current facility-administered medications on file prior to visit.      No Known Allergies    PMH, PSH, FamHx reviewed      OBJECTIVE                                                      BP (!) 159/109 (BP Location: Left arm, Patient Position: Sitting, Cuff Size: Adult Regular)   Pulse 108     Physical Exam  Constitutional:       Appearance: Normal appearance.   HENT:      Head: Normocephalic.   Eyes:      Extraocular Movements: Extraocular movements intact.      Conjunctiva/sclera: Conjunctivae normal.   Cardiovascular:      Rate and Rhythm: Tachycardia present.   Pulmonary:      Effort: Pulmonary effort is normal.   Neurological:      Mental Status: He is alert and oriented to person, place, and time.   Psychiatric:         Attention and Perception: Attention and perception normal.         Mood and Affect: Mood and affect normal.         Speech: Speech normal.          Behavior: Behavior is cooperative.         Thought Content: Thought content normal. Thought content does not include suicidal ideation.         Judgment: Judgment normal.         Labs:    UDS:    Lab Results   Component Value Date    BUP Screen Positive (A) 01/23/2024    BZO Negative 01/23/2024    BAR Negative 01/23/2024    SHIRA Negative 01/23/2024    MAMP Negative 01/23/2024    AMP Negative 01/23/2024    MDMA Negative 01/23/2024    MTD Negative 01/23/2024    [id number] Negative 01/23/2024    OXY Negative 01/23/2024    PCP Negative 01/23/2024    THC Negative 01/23/2024    TEMP Invalid (A) 01/23/2024    SGPOCT 1.025 01/23/2024     *POC urine drug screen does not screen for Fentanyl      Recent Results (from the past 240 hour(s))   Drugs of Abuse Screen Urine (POC CUPS) POCT    Collection Time: 01/23/24 10:30 AM   Result Value Ref Range    POCT Kit Lot Number [id number]     POCT Kit Expiration Date 2025-08-22     Temperature Urine POCT Invalid (A) 90 F, 92 F, 94 F, 96 F, 98 F, 100 F    Specific [place] POCT 1.025 1.005, 1.015, 1.025    pH Qual Urine POCT 7 pH 4 pH, 5 pH, 7 pH, 9 pH    Creatinine Qual Urine POCT 100 mg/dL 20 mg/dL, 50 mg/dL, 100 mg/dL, 200 mg/dL    Internal QC Qual Urine POCT Valid Valid    Amphetamine Qual Urine POCT Negative Negative    Barbiturate Qual Urine POCT Negative Negative    Buprenorphine Qual Urine POCT Screen Positive (A) Negative    Benzodiazepine Qual Urine POCT Negative Negative    Cocaine Qual Urine POCT Negative Negative    Methamphetamine Qual Urine POCT Negative Negative    MDMA Qual Urine POCT Negative Negative    Methadone Qual Urine POCT Negative Negative    Opiate Qual Urine POCT Negative Negative    Oxycodone Qual Urine POCT Negative Negative    Phencyclidine Qual Urine POCT Negative Negative    THC Qual Urine POCT Negative Negative             Gertrudis Cruz MD  Addiction Medicine  [place] [id number] [place] [id number]  [phone]

## 2024-02-27 ENCOUNTER — OFFICE VISIT (OUTPATIENT)
Dept: BEHAVIORAL HEALTH | Facility: CLINIC | Age: 54
End: 2024-02-27

## 2024-02-27 VITALS — HEART RATE: 70 BPM | DIASTOLIC BLOOD PRESSURE: 91 MMHG | SYSTOLIC BLOOD PRESSURE: 142 MMHG

## 2024-02-27 DIAGNOSIS — F10.90 ALCOHOL USE DISORDER: ICD-10-CM

## 2024-02-27 DIAGNOSIS — Z51.81 ENCOUNTER FOR MONITORING OPIOID MAINTENANCE THERAPY: ICD-10-CM

## 2024-02-27 DIAGNOSIS — F11.90 OPIOID USE DISORDER: Primary | ICD-10-CM

## 2024-02-27 DIAGNOSIS — Z79.891 ENCOUNTER FOR MONITORING OPIOID MAINTENANCE THERAPY: ICD-10-CM

## 2024-02-27 DIAGNOSIS — F17.200 NICOTINE USE DISORDER: ICD-10-CM

## 2024-02-27 PROCEDURE — 99214 OFFICE O/P EST MOD 30 MIN: CPT | Performed by: FAMILY MEDICINE

## 2024-02-27 PROCEDURE — 80305 DRUG TEST PRSMV DIR OPT OBS: CPT | Performed by: FAMILY MEDICINE

## 2024-02-27 RX ORDER — BUPRENORPHINE HYDROCHLORIDE AND NALOXONE HYDROCHLORIDE DIHYDRATE 2; .5 MG/1; MG/1
1 TABLET SUBLINGUAL 2 TIMES DAILY
Qty: 60 TABLET | Refills: 0 | Status: SHIPPED | OUTPATIENT
Start: 2024-02-27 | End: 2024-03-27

## 2024-02-27 ASSESSMENT — PATIENT HEALTH QUESTIONNAIRE - PHQ9: SUM OF ALL RESPONSES TO PHQ QUESTIONS 1-9: 0

## 2024-02-27 NOTE — NURSING NOTE
[facility]      Rooming information:  Approximate last use of full opioid agonist: over 10yrs ago   Taking buprenorphine? Yes: suboxone  How much per day? 5mg   Number of buprenorphine films/tablets remaining currently: has them with him   Side effects related to buprenorphine (constipation, dry mouth, sedation?) No   Narcan currently available: Yes  Other recent substance use:    Denies  NICOTINE-Yes:   If using nicotine, ready to quit? No    Point of care urine drug screen positive for:  Lab Results   Component Value Date    BUP Screen Positive (A) 01/23/2024    BZO Negative 01/23/2024    BAR Negative 01/23/2024    SHIRA Negative 01/23/2024    MAMP Negative 01/23/2024    AMP Negative 01/23/2024    MDMA Negative 01/23/2024    MTD Negative 01/23/2024    [id number] Negative 01/23/2024    OXY Negative 01/23/2024    PCP Negative 01/23/2024    THC Negative 01/23/2024    TEMP Invalid (A) 01/23/2024    SGPOCT 1.025 01/23/2024       *POC urine drug screen does not screen for Fentanyl            2/27/2024     3:00 PM   PHQ Assesment Total Score(s)   PHQ-9 Score 0       If PHQ-9 score of 15 or higher, has Recovery Clinic therapist or provider been notified? N/A    Any current suicidal ideation? No  If yes, has [facility] therapist or provider been notified? N/A    Primary care provider: Physician No Ref-Primary     Mental health provider: none (follow up on MH referral if needed)    Insurance needs: active    Housing needs: stable     Current legal issues: none     Contact information up to date? Yes     3rd Party Involvement  (please obtain ROBBIE if pt would like to include)    Justus Orellana MA  February 27, 2024  3:08 PM

## 2024-02-27 NOTE — PROGRESS NOTES
[facility] Follow Up    ASSESSMENT/PLAN                                                    1. Opioid use disorder  Controlled.  Last use of full opioid agonist remains 2015.    Continue buprenorphine 4mg TDD  - buprenorphine-naloxone (SUBOXONE) 2-0.5 MG SUBL sublingual tablet; Place 1 tablet under the tongue 2 times daily  Dispense: 60 tablet; Refill: 0    2. Encounter for monitoring opioid maintenance therapy  - Drugs of Abuse Screen Urine (POC CUPS) POCT    3. Alcohol use disorder  Reports heavier than average alcohol use since his last visit associated with a recent vacation.  Pt has gabapentin at home from old rx but does not like to take due to dizziness.  Not interested in additional psychosocial or medical treatment.      4. Nicotine use disorder  Reports smoking 1/4 ppd or less.  Not interested in NRT.  Encouraged continued decreased use.       Return in about 4 weeks (around 3/26/2024).  Patient counseling completed today:  Discussed mechanism of action, potential risks/benefits/side effects of medications and other recommendations above.      Harm reduction counseling including never use alone, availability of naloxone, risks associated with concurrent use of opioids and benzodiazepines, alcohol, or other sedatives, safer administration as applicable.  Discussed importance of avoiding isolation, building a network of supportive relationships, avoiding people/places/things associated with past use to reduce risk of relapse; including motivational interviewing regarding psychosocial interventions.   SUBJECTIVE                                                      CC/HPI:  Justus Cope is a 53 year old male with PMH neck pain, depression with anxiety, moderate alcohol use disorder, tobacco dependence, and opioid use disorder on buprenorphine  who presents to the [facility] for follow up. Pt was referred by Dr. Shimon Arciniega in Addiction Medicine.      [facility]  "visit:  7/21/22   Last date of full opioid agonist use: 2015    Brief history of use:   Previously following with Dr. Arciniega in Addiction medicine for ongoing management of OUD and AUD. At initial  visit pt reports he has continue Suboxone despite being \"out\" for 2 months. He had been breaking each tablet into multiple pieces and taking it once per day. He will be a couple days between doses. Denies recent full opioid agonist use. Reports he was taking percocet when he was living in [place]. 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 Reports drinking about 3 beers every other day. His goal is NOT abstinence. He has been previously positive for benzo's in the past. Not prescribed this medication. He has been going to AA. \"I know my drinking is problematic.\" Detox (ETOH) from 4/10- 4/17, started inpatient at [facility] which he completed, followed by outpatient through 8/2023.   Episode of alcohol use 8/2023, returned to residential, re-starting IOP 10/9/23  Episode of alcohol use 11/2023 around [facility] for a couple of days. Also drank around [place] 12/24-12/26.    2/27/24 visit:  Pt returns for follow-up about one month after his last visit as recommended.  He has continued to take buprenorphine 4mg/day.  Denies cravings for opioids or return to use.   He states he was drinking more heavily, and daily, while on vacation recently.  He c/o feeling tired.  He reports normal alcohol intake is 2x/week.  Sometimes takes a shot with his coworkers.  Denies cravings for alcohol when not drinking.  Does not like gabapentin due to SE of dizziness.    Smoking about 1/4 ppd or less.       Substance Use History :  Opioids:    first use: 3/2010 - prescribed oxycodone and morphine for chronic pain  Current use: timing of last use: 2015; substance: prescribed pain meds; route: oral and IN                IV drug use: No   History of overdose: " No  Previous treatments : No  Longest period of sobriety: currently   Medical complications related to substance use: denies  Hepatitis C: negative per pt; no recent screening found   HIV: negative per pt; no recent screening found     Other Addiction History:  Stimulants:    denies  Sedatives/hypnotics/anxiolytics:   Past use: denies  Use in last 6 months: denies - UDS positive for BZO on 7/21/22, 3/23/23; also 8/28/23 but confirmatory negative  Alcohol:   H/o drinking throughout the day, most recent use described as 2-4 beers/day.   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  No      [place] Prescription Drug Monitoring Program Reviewed:  Yes  02/13/2024 01/23/2024 2 Buprenorphine-Nalox 2-0.5mg Tb 30.00 30 Li Vol [id number] Timmy ([id number]) 1/1 2.00 mg Comm Ins [place]   01/23/2024 01/23/2024 2 Suboxone 2 Mg-0.5 Mg Sl Film 30.00 30 Li Vol [id number] Timmy ([id number]) 0/1 2.00 mg Medicaid [place]     Medications:  buprenorphine-naloxone (SUBOXONE) 2-0.5 MG SUBL sublingual tablet, Place 1 tablet under the tongue 2 times daily  naloxone (NARCAN) 4 MG/0.1ML nasal spray, Spray 1 spray (4 mg) into one nostril alternating nostrils as needed for opioid reversal every 2-3 minutes until assistance arrives    No current facility-administered medications on file prior to visit.      No  Known Allergies    PMH, PSH, FamHx reviewed      OBJECTIVE                                                      BP (!) 142/91   Pulse 70     Physical Exam  Constitutional:       Appearance: Normal appearance.   HENT:      Head: Normocephalic.   Eyes:      Extraocular Movements: Extraocular movements intact.      Conjunctiva/sclera: Conjunctivae normal.   Pulmonary:      Effort: Pulmonary effort is normal.   Neurological:      Mental Status: He is alert and oriented to person, place, and time.   Psychiatric:         Attention and Perception: Attention and perception normal.         Mood and Affect: Mood and affect normal.         Speech: Speech normal.         Behavior: Behavior is cooperative.         Thought Content: Thought content normal. Thought content does not include suicidal ideation.         Judgment: Judgment normal.         Labs:    UDS:    Lab Results   Component Value Date    BUP Screen Positive (A) 02/27/2024    BZO Negative 02/27/2024    BAR Negative 02/27/2024    SHIRA Negative 02/27/2024    MAMP Negative 02/27/2024    AMP Negative 02/27/2024    MDMA Negative 02/27/2024    MTD Negative 02/27/2024    [id number] Negative 02/27/2024    OXY Negative 02/27/2024    PCP Negative 02/27/2024    THC Negative 02/27/2024    TEMP 92 F 02/27/2024    SGPOCT 1.005 02/27/2024     *POC urine drug screen does not screen for Fentanyl      Recent Results (from the past 240 hour(s))   Drugs of Abuse Screen Urine (POC CUPS) POCT    Collection Time: 02/27/24  3:05 PM   Result Value Ref Range    POCT Kit Lot Number [id number]     POCT Kit Expiration Date 9/4/25     Temperature Urine POCT 92 F 90 F, 92 F, 94 F, 96 F, 98 F, 100 F    Specific [place] POCT 1.005 1.005, 1.015, 1.025    pH Qual Urine POCT 9 pH 4 pH, 5 pH, 7 pH, 9 pH    Creatinine Qual Urine POCT 50 mg/dL 20 mg/dL, 50 mg/dL, 100 mg/dL, 200 mg/dL    Internal QC Qual Urine POCT Valid Valid    Amphetamine Qual Urine POCT Negative Negative    Barbiturate Qual Urine POCT  Negative Negative    Buprenorphine Qual Urine POCT Screen Positive (A) Negative    Benzodiazepine Qual Urine POCT Negative Negative    Cocaine Qual Urine POCT Negative Negative    Methamphetamine Qual Urine POCT Negative Negative    MDMA Qual Urine POCT Negative Negative    Methadone Qual Urine POCT Negative Negative    Opiate Qual Urine POCT Negative Negative    Oxycodone Qual Urine POCT Negative Negative    Phencyclidine Qual Urine POCT Negative Negative    THC Qual Urine POCT Negative Negative             Gertrudis Cruz MD  Addiction Medicine  [facility]  [address] [facility], [address] [id number] [phone]

## 2024-03-27 ENCOUNTER — OFFICE VISIT (OUTPATIENT)
Dept: BEHAVIORAL HEALTH | Facility: CLINIC | Age: 54
End: 2024-03-27

## 2024-03-27 VITALS — HEART RATE: 112 BPM | DIASTOLIC BLOOD PRESSURE: 110 MMHG | SYSTOLIC BLOOD PRESSURE: 156 MMHG

## 2024-03-27 DIAGNOSIS — F10.90 ALCOHOL USE DISORDER: ICD-10-CM

## 2024-03-27 DIAGNOSIS — Z79.891 ENCOUNTER FOR MONITORING OPIOID MAINTENANCE THERAPY: ICD-10-CM

## 2024-03-27 DIAGNOSIS — F11.90 OPIOID USE DISORDER: Primary | ICD-10-CM

## 2024-03-27 DIAGNOSIS — F17.200 NICOTINE USE DISORDER: ICD-10-CM

## 2024-03-27 DIAGNOSIS — Z51.81 ENCOUNTER FOR MONITORING OPIOID MAINTENANCE THERAPY: ICD-10-CM

## 2024-03-27 LAB
AMPHETAMINE QUAL URINE POCT: NEGATIVE
BARBITURATE QUAL URINE POCT: NEGATIVE
BENZODIAZEPINE QUAL URINE POCT: NEGATIVE
BUPRENORPHINE QUAL URINE POCT: ABNORMAL
COCAINE QUAL URINE POCT: NEGATIVE
CREAT UR-MCNC: 325 MG/DL
CREATININE QUAL URINE POCT: ABNORMAL
INTERNAL QC QUAL URINE POCT: ABNORMAL
MDMA QUAL URINE POCT: NEGATIVE
METHADONE QUAL URINE POCT: NEGATIVE
METHAMPHETAMINE QUAL URINE POCT: NEGATIVE
OPIATE QUAL URINE POCT: NEGATIVE
OXYCODONE QUAL URINE POCT: NEGATIVE
PH QUAL URINE POCT: ABNORMAL
PHENCYCLIDINE QUAL URINE POCT: NEGATIVE
POCT KIT EXPIRATION DATE: ABNORMAL
POCT KIT LOT NUMBER: ABNORMAL
SPECIFIC GRAVITY POCT: 1.02
TEMPERATURE URINE POCT: ABNORMAL
THC QUAL URINE POCT: NEGATIVE

## 2024-03-27 PROCEDURE — 99214 OFFICE O/P EST MOD 30 MIN: CPT | Performed by: FAMILY MEDICINE

## 2024-03-27 PROCEDURE — 80305 DRUG TEST PRSMV DIR OPT OBS: CPT | Performed by: FAMILY MEDICINE

## 2024-03-27 PROCEDURE — G0480 DRUG TEST DEF 1-7 CLASSES: HCPCS | Performed by: FAMILY MEDICINE

## 2024-03-27 RX ORDER — BUPRENORPHINE HYDROCHLORIDE AND NALOXONE HYDROCHLORIDE DIHYDRATE 2; .5 MG/1; MG/1
1 TABLET SUBLINGUAL 2 TIMES DAILY
Qty: 60 TABLET | Refills: 0 | Status: SHIPPED | OUTPATIENT
Start: 2024-03-27

## 2024-03-27 ASSESSMENT — PATIENT HEALTH QUESTIONNAIRE - PHQ9: SUM OF ALL RESPONSES TO PHQ QUESTIONS 1-9: 1

## 2024-03-27 NOTE — NURSING NOTE
[facility]    Pt returned to  for 4 week follow up visit, last visit was on 2/27/23. Pt has questions regarding medication and covering cost due to inactive insurance would like to discuss with provider.    Rooming information:  Approximate last use of full opioid agonist: over 10+ years  Taking buprenorphine? Yes: subutex How much per day? 5mg  Number of buprenorphine films/tablets remaining currently: 0  Side effects related to buprenorphine (constipation, dry mouth, sedation?) No   Narcan currently available: Yes  Other recent substance use:    Denies  NICOTINE-Yes:   If using nicotine, ready to quit? No    Point of care urine drug screen positive for:  Lab Results   Component Value Date    BUP Screen Positive (A) 03/27/2024    BZO Negative 03/27/2024    BAR Negative 03/27/2024    SHIRA Negative 03/27/2024    MAMP Negative 03/27/2024    AMP Negative 03/27/2024    MDMA Negative 03/27/2024    MTD Negative 03/27/2024    [id number] Negative 03/27/2024    OXY Negative 03/27/2024    PCP Negative 03/27/2024    THC Negative 03/27/2024    TEMP Invalid (A) 03/27/2024    SGPOCT 1.025 03/27/2024       *POC urine drug screen does not screen for Fentanyl            3/27/2024     2:00 PM   PHQ Assesment Total Score(s)   PHQ-9 Score 1       If PHQ-9 score of 15 or higher, has Recovery Clinic therapist or provider been notified? No    Any current suicidal ideation? No  If yes, has [facility] therapist or provider been notified? No    Primary care provider: Physician No Ref-Primary     Mental health provider: none (follow up on MH referral if needed)    Insurance needs: need to reactivate     Housing needs: stable    Current legal issues: none    Contact information up to date? yes    3rd Party Involvement not today (please obtain ROBBIE if pt would like to include)    Anne Lea MA  March 27, 2024  2:31 PM

## 2024-03-27 NOTE — PROGRESS NOTES
[facility] Follow Up    ASSESSMENT/PLAN                                                    1. Opioid use disorder  Pt reports control of symptoms, last use of full opioid agonist remains 2015.    Continue buprenorphine 4mg TDD (2mg bid)  Pt's insurance appears inactive at time of this visit.  He stated he planned to do a partial fill of current rx and pay out of pocket.  He intends to get a phone so he can call [facility] to address his insurance situation.    Reviewed risks of concurrent use of opioids with alcohol or other sedatives.    - Drugs of Abuse Screen Urine (POC CUPS) POCT; Standing  - Drug Confirmation Panel Urine with Creat - lab collect; Future  - Drug Confirmation Panel Urine with Creat - lab collect  - Drugs of Abuse Screen Urine (POC CUPS) POCT  - buprenorphine-naloxone (SUBOXONE) 2-0.5 MG SUBL sublingual tablet; Place 1 tablet under the tongue 2 times daily  Dispense: 60 tablet; Refill: 0    2. Encounter for monitoring opioid maintenance therapy  - Drugs of Abuse Screen Urine (POC CUPS) POCT; Standing  - Drug Confirmation Panel Urine with Creat - lab collect; Future  - Drug Confirmation Panel Urine with Creat - lab collect  - Drugs of Abuse Screen Urine (POC CUPS) POCT    3. Alcohol use disorder  Pt reporting baseline use of alcohol consisting of 2-3 drinks about every other day, last use 3/26/24.  Denies negative effects in his life related to alcohol use.  Denies cravings when not drinking.  Not interested in any pharmacotherapy directed at reducing use.  Continue to monitor.     4. Nicotine use disorder  Not ready to quit at this time      Return in about 4 weeks (around 4/24/2024).  Patient counseling completed today:  Discussed mechanism of action, potential risks/benefits/side effects of medications and other recommendations above.      Harm reduction counseling including never use alone, availability of naloxone, risks associated with concurrent use of opioids and  "benzodiazepines, alcohol, or other sedatives, safer administration as applicable.  Discussed importance of avoiding isolation, building a network of supportive relationships, avoiding people/places/things associated with past use to reduce risk of relapse; including motivational interviewing regarding psychosocial interventions.   SUBJECTIVE                                                      CC/HPI:  Justus Cope is a 54 year old male with PMH neck pain, depression with anxiety,  alcohol use disorder, tobacco use disorder, and opioid use disorder on buprenorphine  who presents to the Recovery [facility] for follow up. Pt was referred by Dr. Shimon Arciniega in Addiction Medicine.      First Recovery Clinic visit:  7/21/22   Last date of full opioid agonist use: 2015    Brief history of use:   Previously following with Dr. Arciniega in Addiction medicine for ongoing management of OUD and AUD. At initial  visit pt reports he has continue Suboxone despite being \"out\" for 2 months. He had been breaking each tablet into multiple pieces and taking it once per day. He will be a couple days between doses. Denies recent full opioid agonist use. Reports he was taking percocet when he was living in [place]. 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 Reports drinking about 3 beers every other day. His goal is NOT abstinence. He has been previously positive for benzo's in the past. Not prescribed this medication. He has been going to . \"I know my drinking is problematic.\" Detox ([facility]OH) from 4/10- 4/17, started inpatient at [facility] which he completed, followed by outpatient through 8/2023.   Episode of alcohol use 8/2023, returned to residential, re-starting IOP 10/9/23  Continued alcohol use.     3/27/24 visit:  Pt returns for follow-up one month from last visit.  He has continued to take buprenorphine as prescribed.  Denies opioid cravings or " "return to use.  No c/o side effects related to buprenorphine.  He used his last tablet on 3/26/24 and is experiencing some fatigue and loose stools which is common per his report if he misses a dose of buprenorphine.  He would like to continue treatment unchanged.   When asked about alcohol use, he states \"I drank last night.\"  He goes on to say he has been drinking a couple of drinks about every other day, usually after work.  He denies drinking to intoxication.  He denies impact on his sleep or mood.        Substance Use History :  Opioids:    first use: 3/2010 - prescribed oxycodone and morphine for chronic pain  Current use: timing of last use: 2015; substance: prescribed pain meds; route: oral and IN                IV drug use: No   History of overdose: No  Previous treatments : No  Longest period of sobriety: currently   Medical complications related to substance use: denies  Hepatitis C: negative per pt; no recent screening found   HIV: negative per pt; no recent screening found     Other Addiction History:  Stimulants:    denies  Sedatives/hypnotics/anxiolytics:   Past use: denies  Use in last 6 months: denies - UDS positive for BZO on 7/21/22, 3/23/23; also 8/28/23 but confirmatory negative  Alcohol:   H/o drinking throughout the day, most recent use described as 2-4 beers/day.   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  No      [place] Prescription Drug Monitoring Program Reviewed:  Yes  02/27/2024 02/27/2024 2 Buprenorphine-Nalox 2-0.5mg Tb 60.00 30 Li Vol [id number] Timmy ([id number]     Medications:  buprenorphine-naloxone (SUBOXONE) 2-0.5 MG SUBL sublingual tablet, Place 1 tablet under the tongue 2 times daily  naloxone (NARCAN) 4 MG/0.1ML nasal spray, Spray 1 spray (4 mg) into one nostril alternating nostrils as needed for opioid reversal every 2-3 minutes until assistance arrives    No current facility-administered medications on file prior to visit.      No Known Allergies    PMH, PSH, FamHx reviewed      OBJECTIVE                                                      BP (!) 156/110   Pulse 112     Physical Exam  Constitutional:       Appearance: Normal appearance.   HENT:      Head: Normocephalic.   Eyes:      Extraocular Movements: Extraocular movements intact.      Conjunctiva/sclera: Conjunctivae normal.   Pulmonary:      Effort: Pulmonary effort is normal.   Neurological:      Mental Status: He is alert and oriented to person, place, and time.   Psychiatric:         Attention and Perception: Attention and perception normal.         Mood and Affect: Mood and affect normal.         Speech: Speech normal.         Behavior: Behavior is cooperative.         Thought Content: Thought content normal. Thought content does not include suicidal ideation.         Judgment: Judgment normal.         Labs:    UDS:    Lab Results   Component Value Date    BUP Screen Positive (A) 03/27/2024    BZO Negative 03/27/2024    BAR Negative 03/27/2024    SHIRA Negative 03/27/2024    MAMP Negative 03/27/2024    AMP Negative 03/27/2024    MDMA Negative 03/27/2024    MTD Negative 03/27/2024    [id number] Negative 03/27/2024    OXY Negative 03/27/2024    PCP Negative 03/27/2024    THC Negative 03/27/2024    TEMP Invalid (A) 03/27/2024    SGPOCT 1.025 03/27/2024     *POC urine drug  screen does not screen for Fentanyl      Recent Results (from the past 240 hour(s))   Drugs of Abuse Screen Urine (POC CUPS) POCT    Collection Time: 03/27/24  2:59 PM   Result Value Ref Range    POCT Kit Lot Number [id number]     POCT Kit Expiration Date [id number]     Temperature Urine POCT Invalid (A) 90 F, 92 F, 94 F, 96 F, 98 F, 100 F    Specific [place] POCT 1.025 1.005, 1.015, 1.025    pH Qual Urine POCT 5 pH 4 pH, 5 pH, 7 pH, 9 pH    Creatinine Qual Urine POCT 100 mg/dL 20 mg/dL, 50 mg/dL, 100 mg/dL, 200 mg/dL    Internal QC Qual Urine POCT Valid Valid    Amphetamine Qual Urine POCT Negative Negative    Barbiturate Qual Urine POCT Negative Negative    Buprenorphine Qual Urine POCT Screen Positive (A) Negative    Benzodiazepine Qual Urine POCT Negative Negative    Cocaine Qual Urine POCT Negative Negative    Methamphetamine Qual Urine POCT Negative Negative    MDMA Qual Urine POCT Negative Negative    Methadone Qual Urine POCT Negative Negative    Opiate Qual Urine POCT Negative Negative    Oxycodone Qual Urine POCT Negative Negative    Phencyclidine Qual Urine POCT Negative Negative    THC Qual Urine POCT Negative Negative           Gertrudis Cruz MD  Addiction Medicine  [place] [id number] [place] [id number] [phone]

## 2024-03-27 NOTE — NURSING NOTE
No ins; patient given info on how to apply and community health centers. Informed of new policy that will not be able to be seen after 30 days without insurance.     Rowena Marques RN on 3/27/2024 at 4:20 PM

## 2024-03-29 LAB
BUPRENORPHINE UR CFM-MCNC: 89 NG/ML
BUPRENORPHINE/CREAT UR: 27 NG/MG {CREAT}
NALOXONE UR CFM-MCNC: 16 NG/ML
NALOXONE: 5 NG/MG {CREAT}
NORBUPRENORPHINE UR CFM-MCNC: 432 NG/ML
NORBUPRENORPHINE/CREAT UR: 133 NG/MG {CREAT}

## 2024-03-29 NOTE — DISCHARGE INSTRUCTIONS
"Behavioral Discharge Planning and Instructions  THANK YOU FOR CHOOSING [facility]  [facility]  [phone]    Summary: You were admitted to [facility] on 4/10/2023 for detoxification from alcohol.  A medical exam was performed that included lab work. You have met with a  and opted to discharge to [facility].  Please take care and make your recovery a daily priority, Justus!  It was a pleasure working with you and the entire treatment team here wishes you the very best in your recovery!     Recommendation:  Enter treatment at [facility]. Intake appointment 4/18 at 10am. [facility] will call if earlier appointment opens up.    [address] [address]  [phone]    Main Diagnoses:  Per Dr. Kayla MD;  Alcohol dependence with intoxication with complication (H)  History of opiate use disorder, on Suboxone maintenance  Nicotine dependence with current use   ROOPA    Major Treatments, Procedures and Findings:  You were treated for alcohol detoxification using the [facility] protocol. You have had a chemical dependency assessment. You had labs drawn and those results were reviewed with you. Please take a copy of your lab work with you to your next primary care provider appointment.    Symptoms to Report:  If you experience more anxiety, confusion, sleeplessness, deep sadness or thoughts of suicide, notify your treatment team or notify your primary care provider. IF ANY OF THE SYMPTOMS YOU ARE EXPERIENCING ARE A MEDICAL EMERGENCY CALL 911 IMMEDIATELY.     Lifestyle Adjustment: Adjust your lifestyle to get enough sleep, relaxation, exercise and good nutrition. Continue to develop healthy coping skills to decrease stress and promote a sober living environment. Do not use mood altering substances including alcohol, illegal drugs or addictive medications other than what is currently prescribed.     Disposition: Discharge home and then to Inpatient  "Treatment on 4/18 at 10am.     Facts about COVID19 at www.cdc.gov/COVID19 and www.[place].gov/covid19    Keeping hands clean is one of the most important steps we can take to avoid getting sick and spreading germs to others.  Please wash your hands frequently and lather with soap for at least 20 seconds!    Follow-up Appointment:   Suboxone Maintenance Follow-up/Primary Care - Dr. Dawn  [facility]  [facility], 6th floor  [address] [facility]/ [place], [place]   [phone]  Appointment Date/Time: Please make a follow-up appointment with Provider as soon as possible.     Recovery apps for your phone to locate current in person and zoom recovery meetings  [place] - meeting severo  AA  - meeting severo  Meeting guide - meeting severo  Quick NA meeting - meeting severo  [place]- has various apps    Resources:  *due to covid-19 most AA/NA meetings are being held online however some are in-person or a hybrid combination please check online to verify*  Need Support Now? If you or someone you know is struggling or in crisis, help is available. Call or text Imagimod or chat Hotalot  AA meetings search for them at: https://aa-intergroup.org (worldwide meeting listings)  AA meetings for [place] area can be found online at: https://[place].org (click local online meetings listings)  NA meetings for [place] area can be found online at: https://www.nam[place].org  (click find a meeting)  AA and NA Sponsors are excellent resources for support and you can find one at any support group meeting.   Alcoholics Anonymous (https://aa.org/): for information 24 hours/day  AA Intergroup service office in [place] (http://www.aastpaul.org/) 652.233.5962  AA Intergroup service office in [facility]: [phone]. (http://www.[place].org/)  Narcotics Anonymous (www.nam[place].org) (985) 764-8061  https://aafairviewriverside.org/meetings  SMART Recovery - self management for addiction recovery:   www.[facility].org  Pathways ~ A Health Crisis Resource & Support Center:  488.483.2968.  https://prescribetoprevent.org/patient-education/videos/  http://www.harmreduction.org  [facility] [place] 575-049-9130  Support Group:  AA/NA and Sponsor/support.  National [place] on Mental Illness (www.[place].stella.org): 418.306.6724 or 616-898-3666.  Alcoholics Anonymous (www.alcoholics-anonymous.org): Check your phone book for your local chapter.  Suicide Awareness Voices of Education (SAVE) (www.save.org): 213-790-NZDK (0902)  National Suicide Prevention Line (www.mentalButterfly Healthmn.org): 638-494-OCMX (2240)  Mental Health Consumer/Survivor Network of [place] (www.mhcsn.net): 676.639.1391 or 803-999-7881  Mental Health Association of [place] (www.mentalhealth.org): 623.132.4035 or 465-431-9800   Substance Abuse and Mental Health Services (www.samhsa.gov)  [place] Opioid Prevention Coalition: www.opioidcoalition.org    [place] Recovery Connection (MRC)  [facility] connects people seeking recovery to resources that help foster and sustain long-term recovery.  Whether you are seeking resources for treatment, transportation, housing, job training, education, health care or other pathways to recovery, [facility] is a great place to start.  810.576.4026.  www.mValent.org    Great Pod casts for nutrition and wellness  Listen on Apple Podcasts  Dishing Up Nutrition   [employer]   Nutrition       Understand the connection between what you eat and how you feel. Hosted by licensed nutritionists and dietitians from [employer] we share practical, real-life solutions for healthier living through nutrition.     General Medication Instructions:   See your medication sheet(s) for instructions.   Take all medications as prescribed.  Make no changes unless your primary care provider suggests them.   Go to all your primary care provider visits.  Be sure to have all your required lab tests. This way, your  medicines can be refilled on time.  Do not use any forms of alcohol.    Please Note:  If you have any questions at anytime after you are discharged please call [facility] detox unit 3AW at [phone].  [facility], [facility] [phone]  Medical Records call [phone]  Outpatient [facility] call [phone]  LP+ Wait List/Bed Availability call [phone]    Please remember to take all of your behavioral discharge planning and lab paperwork to any follow up appointments, it contains your lab results, diagnosis, medication list and discharge recommendations.      THANK YOU FOR CHOOSING [facility]

## 2024-07-14 ENCOUNTER — HEALTH MAINTENANCE LETTER (OUTPATIENT)
Age: 54
End: 2024-07-14

## 2025-07-19 ENCOUNTER — HEALTH MAINTENANCE LETTER (OUTPATIENT)
Age: 55
End: 2025-07-19